# Patient Record
Sex: MALE | Race: WHITE | Employment: OTHER | URBAN - METROPOLITAN AREA
[De-identification: names, ages, dates, MRNs, and addresses within clinical notes are randomized per-mention and may not be internally consistent; named-entity substitution may affect disease eponyms.]

---

## 2017-02-28 ENCOUNTER — HOSPITAL ENCOUNTER (OUTPATIENT)
Dept: MRI IMAGING | Age: 72
Discharge: HOME | End: 2017-02-28
Attending: ORTHOPAEDIC SURGERY
Payer: MEDICARE

## 2017-02-28 DIAGNOSIS — M48.02 SPINAL STENOSIS IN CERVICAL REGION: ICD-10-CM

## 2017-02-28 DIAGNOSIS — M75.121 COMPLETE TEAR OF RIGHT ROTATOR CUFF: ICD-10-CM

## 2017-02-28 PROCEDURE — 72141 MRI NECK SPINE W/O DYE: CPT

## 2017-02-28 PROCEDURE — 73221 MRI JOINT UPR EXTREM W/O DYE: CPT

## 2017-09-18 ENCOUNTER — HOSPITAL ENCOUNTER (OUTPATIENT)
Dept: MRI IMAGING | Age: 72
Discharge: HOME | End: 2017-09-18
Attending: SPECIALIST
Payer: MEDICARE

## 2017-09-18 DIAGNOSIS — M54.9 BACK PAIN: ICD-10-CM

## 2017-09-18 DIAGNOSIS — M48.061 DEGENERATIVE LUMBAR SPINAL STENOSIS: ICD-10-CM

## 2017-09-18 DIAGNOSIS — M54.16 LUMBAR RADICULOPATHY: ICD-10-CM

## 2017-09-18 PROCEDURE — 72148 MRI LUMBAR SPINE W/O DYE: CPT

## 2018-08-06 ENCOUNTER — HOSPITAL ENCOUNTER (OUTPATIENT)
Dept: MRI IMAGING | Age: 73
Discharge: HOME | End: 2018-08-06
Attending: PAIN MEDICINE
Payer: MEDICARE

## 2018-08-06 DIAGNOSIS — M54.16 LUMBAR RADICULOPATHY: ICD-10-CM

## 2018-08-06 PROCEDURE — 72148 MRI LUMBAR SPINE W/O DYE: CPT

## 2018-12-28 ENCOUNTER — HOSPITAL ENCOUNTER (OUTPATIENT)
Dept: PREADMISSION TESTING | Age: 73
Discharge: HOME | End: 2018-12-28
Payer: MEDICARE

## 2018-12-28 VITALS
HEIGHT: 66 IN | DIASTOLIC BLOOD PRESSURE: 63 MMHG | WEIGHT: 266.56 LBS | TEMPERATURE: 97.6 F | OXYGEN SATURATION: 94 % | SYSTOLIC BLOOD PRESSURE: 135 MMHG | HEART RATE: 63 BPM | RESPIRATION RATE: 20 BRPM | BODY MASS INDEX: 42.84 KG/M2

## 2018-12-28 LAB
25(OH)D3 SERPL-MCNC: 34 NG/ML (ref 30–100)
ABO + RH BLD: NORMAL
ALBUMIN SERPL-MCNC: 3.7 G/DL (ref 3.5–5)
ALBUMIN/GLOB SERPL: 1.1 {RATIO} (ref 1.1–2.2)
ALP SERPL-CCNC: 43 U/L (ref 45–117)
ALT SERPL-CCNC: 28 U/L (ref 12–78)
ANION GAP SERPL CALC-SCNC: 11 MMOL/L (ref 5–15)
APPEARANCE UR: CLEAR
APTT PPP: 28.7 SEC (ref 22.1–32)
AST SERPL-CCNC: 22 U/L (ref 15–37)
ATRIAL RATE: 58 BPM
BACTERIA URNS QL MICRO: NEGATIVE /HPF
BASOPHILS # BLD: 0.1 K/UL (ref 0–0.1)
BASOPHILS NFR BLD: 1 % (ref 0–1)
BILIRUB SERPL-MCNC: 0.6 MG/DL (ref 0.2–1)
BILIRUB UR QL: NEGATIVE
BLOOD GROUP ANTIBODIES SERPL: NORMAL
BUN SERPL-MCNC: 23 MG/DL (ref 6–20)
BUN/CREAT SERPL: 19 (ref 12–20)
CALCIUM SERPL-MCNC: 9.5 MG/DL (ref 8.5–10.1)
CALCULATED P AXIS, ECG09: 20 DEGREES
CALCULATED R AXIS, ECG10: 53 DEGREES
CALCULATED T AXIS, ECG11: 48 DEGREES
CHLORIDE SERPL-SCNC: 103 MMOL/L (ref 97–108)
CO2 SERPL-SCNC: 28 MMOL/L (ref 21–32)
COLOR UR: NORMAL
CREAT SERPL-MCNC: 1.24 MG/DL (ref 0.7–1.3)
DIAGNOSIS, 93000: NORMAL
DIFFERENTIAL METHOD BLD: ABNORMAL
EOSINOPHIL # BLD: 0.2 K/UL (ref 0–0.4)
EOSINOPHIL NFR BLD: 2 % (ref 0–7)
EPITH CASTS URNS QL MICRO: NORMAL /LPF
ERYTHROCYTE [DISTWIDTH] IN BLOOD BY AUTOMATED COUNT: 15 % (ref 11.5–14.5)
EST. AVERAGE GLUCOSE BLD GHB EST-MCNC: 140 MG/DL
GLOBULIN SER CALC-MCNC: 3.4 G/DL (ref 2–4)
GLUCOSE SERPL-MCNC: 110 MG/DL (ref 65–100)
GLUCOSE UR STRIP.AUTO-MCNC: NEGATIVE MG/DL
HBA1C MFR BLD: 6.5 % (ref 4.2–6.3)
HCT VFR BLD AUTO: 37.1 % (ref 36.6–50.3)
HGB BLD-MCNC: 12.1 G/DL (ref 12.1–17)
HGB UR QL STRIP: NEGATIVE
HYALINE CASTS URNS QL MICRO: NORMAL /LPF (ref 0–5)
IMM GRANULOCYTES # BLD: 0 K/UL (ref 0–0.04)
IMM GRANULOCYTES NFR BLD AUTO: 0 % (ref 0–0.5)
INR PPP: 1.1 (ref 0.9–1.1)
KETONES UR QL STRIP.AUTO: NEGATIVE MG/DL
LEUKOCYTE ESTERASE UR QL STRIP.AUTO: NEGATIVE
LYMPHOCYTES # BLD: 1.2 K/UL (ref 0.8–3.5)
LYMPHOCYTES NFR BLD: 16 % (ref 12–49)
MCH RBC QN AUTO: 30.3 PG (ref 26–34)
MCHC RBC AUTO-ENTMCNC: 32.6 G/DL (ref 30–36.5)
MCV RBC AUTO: 93 FL (ref 80–99)
MONOCYTES # BLD: 0.7 K/UL (ref 0–1)
MONOCYTES NFR BLD: 9 % (ref 5–13)
NEUTS SEG # BLD: 5.4 K/UL (ref 1.8–8)
NEUTS SEG NFR BLD: 71 % (ref 32–75)
NITRITE UR QL STRIP.AUTO: NEGATIVE
NRBC # BLD: 0 K/UL (ref 0–0.01)
NRBC BLD-RTO: 0 PER 100 WBC
P-R INTERVAL, ECG05: 166 MS
PH UR STRIP: 7.5 [PH] (ref 5–8)
PLATELET # BLD AUTO: 240 K/UL (ref 150–400)
PMV BLD AUTO: 11 FL (ref 8.9–12.9)
POTASSIUM SERPL-SCNC: 4.2 MMOL/L (ref 3.5–5.1)
PROT SERPL-MCNC: 7.1 G/DL (ref 6.4–8.2)
PROT UR STRIP-MCNC: NEGATIVE MG/DL
PROTHROMBIN TIME: 11.3 SEC (ref 9–11.1)
Q-T INTERVAL, ECG07: 438 MS
QRS DURATION, ECG06: 92 MS
QTC CALCULATION (BEZET), ECG08: 429 MS
RBC # BLD AUTO: 3.99 M/UL (ref 4.1–5.7)
RBC #/AREA URNS HPF: NORMAL /HPF (ref 0–5)
SODIUM SERPL-SCNC: 142 MMOL/L (ref 136–145)
SP GR UR REFRACTOMETRY: 1.02 (ref 1–1.03)
SPECIMEN EXP DATE BLD: NORMAL
THERAPEUTIC RANGE,PTTT: NORMAL SECS (ref 58–77)
UA: UC IF INDICATED,UAUC: NORMAL
UROBILINOGEN UR QL STRIP.AUTO: 1 EU/DL (ref 0.2–1)
VENTRICULAR RATE, ECG03: 58 BPM
WBC # BLD AUTO: 7.6 K/UL (ref 4.1–11.1)
WBC URNS QL MICRO: NORMAL /HPF (ref 0–4)

## 2018-12-28 PROCEDURE — 83036 HEMOGLOBIN GLYCOSYLATED A1C: CPT

## 2018-12-28 PROCEDURE — 93005 ELECTROCARDIOGRAM TRACING: CPT

## 2018-12-28 PROCEDURE — 86900 BLOOD TYPING SEROLOGIC ABO: CPT

## 2018-12-28 PROCEDURE — 82306 VITAMIN D 25 HYDROXY: CPT

## 2018-12-28 PROCEDURE — 80053 COMPREHEN METABOLIC PANEL: CPT

## 2018-12-28 PROCEDURE — 36415 COLL VENOUS BLD VENIPUNCTURE: CPT

## 2018-12-28 PROCEDURE — 81001 URINALYSIS AUTO W/SCOPE: CPT

## 2018-12-28 PROCEDURE — 85025 COMPLETE CBC W/AUTO DIFF WBC: CPT

## 2018-12-28 PROCEDURE — 85730 THROMBOPLASTIN TIME PARTIAL: CPT

## 2018-12-28 PROCEDURE — 85610 PROTHROMBIN TIME: CPT

## 2018-12-28 RX ORDER — ATORVASTATIN CALCIUM 20 MG/1
40 TABLET, FILM COATED ORAL
COMMUNITY
End: 2022-10-26

## 2018-12-28 RX ORDER — GABAPENTIN 300 MG/1
300 CAPSULE ORAL 4 TIMES DAILY
COMMUNITY
End: 2018-12-28

## 2018-12-28 RX ORDER — ASPIRIN 325 MG
325 TABLET ORAL DAILY
COMMUNITY
End: 2019-01-11

## 2018-12-28 RX ORDER — CHOLECALCIFEROL (VITAMIN D3) 125 MCG
1 CAPSULE ORAL
COMMUNITY
End: 2022-10-26

## 2018-12-28 RX ORDER — CLONIDINE 0.1 MG/24H
1 PATCH, EXTENDED RELEASE TRANSDERMAL
COMMUNITY

## 2018-12-28 RX ORDER — AMLODIPINE BESYLATE AND BENAZEPRIL HYDROCHLORIDE 10; 40 MG/1; MG/1
1 CAPSULE ORAL DAILY
COMMUNITY

## 2018-12-28 RX ORDER — FENOFIBRATE 160 MG/1
160 TABLET ORAL
COMMUNITY
End: 2022-10-26

## 2018-12-28 RX ORDER — CARVEDILOL 25 MG/1
25 TABLET ORAL 2 TIMES DAILY WITH MEALS
COMMUNITY

## 2018-12-28 RX ORDER — GABAPENTIN 600 MG/1
600 TABLET ORAL
COMMUNITY
End: 2022-10-26

## 2018-12-28 RX ORDER — PIOGLITAZONE HCL AND METFORMIN HCL 500; 15 MG/1; MG/1
1 TABLET ORAL
COMMUNITY

## 2018-12-28 RX ORDER — HYDROCHLOROTHIAZIDE 25 MG/1
25 TABLET ORAL
COMMUNITY

## 2018-12-28 NOTE — PERIOP NOTES
[address] [facility] [address], [address] [facility]                            MAIN OR                                  [phone]   MAIN PRE OP                          [phone]                                                                                AMBULATORY PRE OP          [phone]  PRE-ADMISSION TESTING    [phone]     Surgery Date:   Monday, 1/7/19. Is surgery arrival time given by surgeon? NO  If NO, [facility] staff will call you between 3 and 7pm the day before your surgery with your arrival time. (If your surgery is on a Monday, we will call you the Friday before.)    Call [phone] after 7pm Monday-Friday if you did not receive your arrival time. Verification phone call on Friday, 1/4/19.     INSTRUCTIONS BEFORE YOUR SURGERY   When You  Arrive   Arrive at the [address] [facility] on the day of your surgery  Have your insurance card, photo ID, and any copayment (if needed)     Food   and   Drink   NO food or drink after midnight the night before surgery    This means NO water, gum, mints, coffee, juice, etc.  No alcohol (beer, wine, liquor) 24 hours before and after surgery     Medications to   TAKE   Morning of Surgery   MEDICATIONS TO TAKE THE MORNING OF SURGERY WITH A SIP OF WATER:    Carvedilol   with small sip of water     Medications  To  STOP      7 days before surgery    Non-Steroidal anti-inflammatory Drugs (NSAID's): for example, Ibuprofen (Advil, Motrin), Naproxen (Aleve) STOP Tuesday, 1/1/19 until after surgery   Aspirin, if taking for pain STOP Tuesday, 1/1/19 until after surgery   Herbal supplements, vitamins, and fish oil STOP Tuesday, 1/1/19 until after surgery   Other:  (Pain medications not listed above, including Tylenol may be taken)   Blood  Thinners    If you take  Aspirin, Plavix, Coumadin, or any blood-thinning or anti-blood clot medicine, talk to the doctor who prescribed the medications for pre-operative instructions. Bathing Clothing  Jewelry  Valuables       If you shower the morning of surgery, please do not apply anything to your skin (lotions, powders, deodorant, or makeup, especially mascara)   Follow all special bath instructions (for total joint replacement, spine and bowel surgeries)   Do not shave or trim anywhere 24 hours before surgery   Wear your hair loose or down; no pony-tails, buns, or metal hair clips   Wear loose, comfortable, clean clothes   Wear glasses instead of contacts  [employer] money, valuables, and jewelry, including body piercings, at home     Going Home       or Spending the Night    SAME-DAY SURGERY: You must have a responsible adult drive you home and stay with you 24 hours after surgery   ADMITS: If your doctor is keeping you into the hospital after surgery, leave personal belongings/luggage in your car until you have a hospital room number. Hospital discharge time is 12 noon  Drivers must be here before 12 noon unless you are told differently   Special Instructions 16. Special Instructions:  · Use Chlorhexidine Care Fusion wash and sponges 3 days prior to surgery as instructed. · Incentive spirometer given with instructions to practice at home and bring back to the hospital on the day of surgery. · [facility] will contact you if your Hemoglobin A1C is greater than 7.5. · Ensure/Glucerna  sample, nutritional information, and Ensure/Glucerna coupon given. · Pain pamphlet and Call Don't Fall reminder reviewed with patient. ·  parking is complimentary Monday - Friday 7 am - 5 pm  · Bring PTA Medication list day of surgery with the last doses taken documented   · Do not bring medication bottles the day of surgery  · Bring C-pap on day of surgery  · DO NOT TAKE ANY DIABETES MEDICINE DAY OF SURGERY         Follow all instructions so your surgery wont be cancelled. Please, be on time.                     If a situation occurs and you are delayed the day of surgery, call [phone] or          [phone]. If your physical condition changes (like a fever, cold, flu, etc.) call your surgeon. The patient / Caregiver was contacted  in person. The patient verbalizes understanding of all instructions and does not  need reinforcement.
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
## 2018-12-29 LAB
BACTERIA SPEC CULT: ABNORMAL
BACTERIA SPEC CULT: ABNORMAL
SERVICE CMNT-IMP: ABNORMAL

## 2018-12-30 RX ORDER — MUPIROCIN 20 MG/G
OINTMENT TOPICAL 2 TIMES DAILY
Qty: 22 G | Refills: 0 | Status: SHIPPED | OUTPATIENT
Start: 2018-12-30 | End: 2019-01-04

## 2019-01-04 ENCOUNTER — ANESTHESIA EVENT (OUTPATIENT)
Dept: SURGERY | Age: 74
DRG: 454 | End: 2019-01-04
Payer: MEDICARE

## 2019-01-07 ENCOUNTER — APPOINTMENT (OUTPATIENT)
Dept: GENERAL RADIOLOGY | Age: 74
DRG: 454 | End: 2019-01-07
Attending: ORTHOPAEDIC SURGERY
Payer: MEDICARE

## 2019-01-07 ENCOUNTER — ANESTHESIA (OUTPATIENT)
Dept: SURGERY | Age: 74
DRG: 454 | End: 2019-01-07
Payer: MEDICARE

## 2019-01-07 ENCOUNTER — HOSPITAL ENCOUNTER (INPATIENT)
Age: 74
LOS: 4 days | Discharge: HOME HEALTH | DRG: 454 | End: 2019-01-11
Attending: ORTHOPAEDIC SURGERY | Admitting: ORTHOPAEDIC SURGERY
Payer: MEDICARE

## 2019-01-07 DIAGNOSIS — M43.16 SPONDYLOLISTHESIS, LUMBAR REGION: Primary | ICD-10-CM

## 2019-01-07 LAB
GLUCOSE BLD STRIP.AUTO-MCNC: 116 MG/DL (ref 65–100)
GLUCOSE BLD STRIP.AUTO-MCNC: 147 MG/DL (ref 65–100)
SERVICE CMNT-IMP: ABNORMAL
SERVICE CMNT-IMP: ABNORMAL

## 2019-01-07 PROCEDURE — 77030026188 HC BN CANC CHP CRSH PR LIFV -E: Performed by: ORTHOPAEDIC SURGERY

## 2019-01-07 PROCEDURE — 77030034274 HC GRFT BN CHIP ALLGRFT 10CC REGE -I: Performed by: ORTHOPAEDIC SURGERY

## 2019-01-07 PROCEDURE — 76060000037 HC ANESTHESIA 3 TO 3.5 HR: Performed by: ORTHOPAEDIC SURGERY

## 2019-01-07 PROCEDURE — 77030018846 HC SOL IRR STRL H20 ICUM -A: Performed by: ORTHOPAEDIC SURGERY

## 2019-01-07 PROCEDURE — 74011250636 HC RX REV CODE- 250/636: Performed by: ANESTHESIOLOGY

## 2019-01-07 PROCEDURE — 77030018719 HC DRSG PTCH ANTIMIC J&J -A: Performed by: ORTHOPAEDIC SURGERY

## 2019-01-07 PROCEDURE — 77030039267 HC ADH SKN EXOFIN S2SG -B: Performed by: ORTHOPAEDIC SURGERY

## 2019-01-07 PROCEDURE — 0SG00AJ FUSION OF LUMBAR VERTEBRAL JOINT WITH INTERBODY FUSION DEVICE, POSTERIOR APPROACH, ANTERIOR COLUMN, OPEN APPROACH: ICD-10-PCS | Performed by: ORTHOPAEDIC SURGERY

## 2019-01-07 PROCEDURE — 76010000173 HC OR TIME 3 TO 3.5 HR INTENSV-TIER 1: Performed by: ORTHOPAEDIC SURGERY

## 2019-01-07 PROCEDURE — 74011000250 HC RX REV CODE- 250: Performed by: ORTHOPAEDIC SURGERY

## 2019-01-07 PROCEDURE — 77030032490 HC SLV COMPR SCD KNE COVD -B

## 2019-01-07 PROCEDURE — 77030018723 HC ELCTRD BLD COVD -A: Performed by: ORTHOPAEDIC SURGERY

## 2019-01-07 PROCEDURE — 8E0WXBF COMPUTER ASSISTED PROCEDURE OF TRUNK REGION, WITH FLUOROSCOPY: ICD-10-PCS | Performed by: ORTHOPAEDIC SURGERY

## 2019-01-07 PROCEDURE — 77030002982 HC SUT POLYSRB J&J -A: Performed by: ORTHOPAEDIC SURGERY

## 2019-01-07 PROCEDURE — 0SG0071 FUSION OF LUMBAR VERTEBRAL JOINT WITH AUTOLOGOUS TISSUE SUBSTITUTE, POSTERIOR APPROACH, POSTERIOR COLUMN, OPEN APPROACH: ICD-10-PCS | Performed by: ORTHOPAEDIC SURGERY

## 2019-01-07 PROCEDURE — 74011250636 HC RX REV CODE- 250/636

## 2019-01-07 PROCEDURE — 77030008684 HC TU ET CUF COVD -B: Performed by: ANESTHESIOLOGY

## 2019-01-07 PROCEDURE — 77030012406 HC DRN WND PENRS BARD -A: Performed by: ORTHOPAEDIC SURGERY

## 2019-01-07 PROCEDURE — 77030034850: Performed by: ORTHOPAEDIC SURGERY

## 2019-01-07 PROCEDURE — 74011250636 HC RX REV CODE- 250/636: Performed by: ORTHOPAEDIC SURGERY

## 2019-01-07 PROCEDURE — C1713 ANCHOR/SCREW BN/BN,TIS/BN: HCPCS | Performed by: ORTHOPAEDIC SURGERY

## 2019-01-07 PROCEDURE — 76210000005 HC OR PH I REC 5 TO 5.5 HR: Performed by: ORTHOPAEDIC SURGERY

## 2019-01-07 PROCEDURE — 77030037202 HC SPCR SPN BULL TIP PEK VBR IBF REGE -I1: Performed by: ORTHOPAEDIC SURGERY

## 2019-01-07 PROCEDURE — 77030002933 HC SUT MCRYL J&J -A: Performed by: ORTHOPAEDIC SURGERY

## 2019-01-07 PROCEDURE — 77030003161 HC GRFT DURA MTRX INLC -E: Performed by: ORTHOPAEDIC SURGERY

## 2019-01-07 PROCEDURE — 77030032490 HC SLV COMPR SCD KNE COVD -B: Performed by: ORTHOPAEDIC SURGERY

## 2019-01-07 PROCEDURE — 77030033067 HC SUT PDO STRATFX SPIR J&J -B: Performed by: ORTHOPAEDIC SURGERY

## 2019-01-07 PROCEDURE — 0ST20ZZ RESECTION OF LUMBAR VERTEBRAL DISC, OPEN APPROACH: ICD-10-PCS | Performed by: ORTHOPAEDIC SURGERY

## 2019-01-07 PROCEDURE — 77030030107 HC BLD BN MILL DISP STRY -C: Performed by: ORTHOPAEDIC SURGERY

## 2019-01-07 PROCEDURE — 82962 GLUCOSE BLOOD TEST: CPT

## 2019-01-07 PROCEDURE — 74011000272 HC RX REV CODE- 272: Performed by: ORTHOPAEDIC SURGERY

## 2019-01-07 PROCEDURE — 77030003666 HC NDL SPINAL BD -A: Performed by: ORTHOPAEDIC SURGERY

## 2019-01-07 PROCEDURE — 77030029099 HC BN WAX SSPC -A: Performed by: ORTHOPAEDIC SURGERY

## 2019-01-07 PROCEDURE — 77030031139 HC SUT VCRL2 J&J -A: Performed by: ORTHOPAEDIC SURGERY

## 2019-01-07 PROCEDURE — 65270000029 HC RM PRIVATE

## 2019-01-07 PROCEDURE — 77030004391 HC BUR FLUT MEDT -C: Performed by: ORTHOPAEDIC SURGERY

## 2019-01-07 PROCEDURE — 74011000250 HC RX REV CODE- 250

## 2019-01-07 DEVICE — SPACER SPNL 12X22 MM BULL TIP IF PEEK VBR: Type: IMPLANTABLE DEVICE | Site: BACK | Status: FUNCTIONAL

## 2019-01-07 DEVICE — SCR SET SPNE STREAMLINE TL --: Type: IMPLANTABLE DEVICE | Site: BACK | Status: FUNCTIONAL

## 2019-01-07 DEVICE — SCR BNE SPNE 6.5X50MM TI -- STREAMLINE TL: Type: IMPLANTABLE DEVICE | Site: BACK | Status: FUNCTIONAL

## 2019-01-07 DEVICE — GRAFT BNE SUB 10CC CORT CANC DBM CRYOGENICALLY PRESERVED: Type: IMPLANTABLE DEVICE | Site: BACK | Status: FUNCTIONAL

## 2019-01-07 DEVICE — DURAGEN® SUTURABLE DURAL REGENERATION MATRIX, 2 IN X 2 IN (5 CM X 5 CM)
Type: IMPLANTABLE DEVICE | Site: BACK | Status: FUNCTIONAL
Brand: DURAGEN® SUTURABLE

## 2019-01-07 DEVICE — BONE CHIP CANC CRSH 1-8MM 30ML --: Type: IMPLANTABLE DEVICE | Site: BACK | Status: FUNCTIONAL

## 2019-01-07 DEVICE — 5.5MM CURVED ROD 35MM TI ALLOY
Type: IMPLANTABLE DEVICE | Site: BACK | Status: FUNCTIONAL
Brand: TAURUS

## 2019-01-07 RX ORDER — LIDOCAINE HYDROCHLORIDE 20 MG/ML
INJECTION, SOLUTION EPIDURAL; INFILTRATION; INTRACAUDAL; PERINEURAL AS NEEDED
Status: DISCONTINUED | OUTPATIENT
Start: 2019-01-07 | End: 2019-01-07 | Stop reason: HOSPADM

## 2019-01-07 RX ORDER — SUCCINYLCHOLINE CHLORIDE 20 MG/ML
INJECTION INTRAMUSCULAR; INTRAVENOUS AS NEEDED
Status: DISCONTINUED | OUTPATIENT
Start: 2019-01-07 | End: 2019-01-07 | Stop reason: HOSPADM

## 2019-01-07 RX ORDER — HYDROMORPHONE HCL/0.9% NACL/PF 0.5 MG/ML
PLASTIC BAG, INJECTION (ML) INTRAVENOUS
Status: DISPENSED | OUTPATIENT
Start: 2019-01-07 | End: 2019-01-08

## 2019-01-07 RX ORDER — CEFAZOLIN SODIUM/WATER 2 G/20 ML
3 SYRINGE (ML) INTRAVENOUS
Status: DISCONTINUED | OUTPATIENT
Start: 2019-01-07 | End: 2019-01-07 | Stop reason: HOSPADM

## 2019-01-07 RX ORDER — VANCOMYCIN HYDROCHLORIDE 1 G/20ML
INJECTION, POWDER, LYOPHILIZED, FOR SOLUTION INTRAVENOUS AS NEEDED
Status: DISCONTINUED | OUTPATIENT
Start: 2019-01-07 | End: 2019-01-07 | Stop reason: HOSPADM

## 2019-01-07 RX ORDER — HYDROMORPHONE HYDROCHLORIDE 1 MG/ML
.5-1 INJECTION, SOLUTION INTRAMUSCULAR; INTRAVENOUS; SUBCUTANEOUS
Status: DISCONTINUED | OUTPATIENT
Start: 2019-01-07 | End: 2019-01-08 | Stop reason: HOSPADM

## 2019-01-07 RX ORDER — GLYCOPYRROLATE 0.2 MG/ML
INJECTION INTRAMUSCULAR; INTRAVENOUS AS NEEDED
Status: DISCONTINUED | OUTPATIENT
Start: 2019-01-07 | End: 2019-01-07 | Stop reason: HOSPADM

## 2019-01-07 RX ORDER — PROPOFOL 10 MG/ML
INJECTION, EMULSION INTRAVENOUS AS NEEDED
Status: DISCONTINUED | OUTPATIENT
Start: 2019-01-07 | End: 2019-01-07 | Stop reason: HOSPADM

## 2019-01-07 RX ORDER — ROCURONIUM BROMIDE 10 MG/ML
INJECTION, SOLUTION INTRAVENOUS AS NEEDED
Status: DISCONTINUED | OUTPATIENT
Start: 2019-01-07 | End: 2019-01-07 | Stop reason: HOSPADM

## 2019-01-07 RX ORDER — CEFAZOLIN SODIUM 1 G/3ML
INJECTION, POWDER, FOR SOLUTION INTRAMUSCULAR; INTRAVENOUS AS NEEDED
Status: DISCONTINUED | OUTPATIENT
Start: 2019-01-07 | End: 2019-01-07 | Stop reason: HOSPADM

## 2019-01-07 RX ORDER — POVIDONE-IODINE 10 %
SOLUTION, NON-ORAL TOPICAL AS NEEDED
Status: DISCONTINUED | OUTPATIENT
Start: 2019-01-07 | End: 2019-01-07 | Stop reason: HOSPADM

## 2019-01-07 RX ORDER — MIDAZOLAM HYDROCHLORIDE 1 MG/ML
INJECTION, SOLUTION INTRAMUSCULAR; INTRAVENOUS AS NEEDED
Status: DISCONTINUED | OUTPATIENT
Start: 2019-01-07 | End: 2019-01-07 | Stop reason: HOSPADM

## 2019-01-07 RX ORDER — LIDOCAINE HYDROCHLORIDE 10 MG/ML
0.1 INJECTION, SOLUTION EPIDURAL; INFILTRATION; INTRACAUDAL; PERINEURAL AS NEEDED
Status: DISCONTINUED | OUTPATIENT
Start: 2019-01-07 | End: 2019-01-07 | Stop reason: HOSPADM

## 2019-01-07 RX ORDER — SODIUM CHLORIDE, SODIUM LACTATE, POTASSIUM CHLORIDE, CALCIUM CHLORIDE 600; 310; 30; 20 MG/100ML; MG/100ML; MG/100ML; MG/100ML
125 INJECTION, SOLUTION INTRAVENOUS CONTINUOUS
Status: DISCONTINUED | OUTPATIENT
Start: 2019-01-07 | End: 2019-01-07 | Stop reason: HOSPADM

## 2019-01-07 RX ORDER — HYDROMORPHONE HYDROCHLORIDE 2 MG/ML
INJECTION, SOLUTION INTRAMUSCULAR; INTRAVENOUS; SUBCUTANEOUS AS NEEDED
Status: DISCONTINUED | OUTPATIENT
Start: 2019-01-07 | End: 2019-01-07 | Stop reason: HOSPADM

## 2019-01-07 RX ORDER — FENTANYL CITRATE 50 UG/ML
INJECTION, SOLUTION INTRAMUSCULAR; INTRAVENOUS AS NEEDED
Status: DISCONTINUED | OUTPATIENT
Start: 2019-01-07 | End: 2019-01-07 | Stop reason: HOSPADM

## 2019-01-07 RX ORDER — SODIUM CHLORIDE 9 MG/ML
125 INJECTION, SOLUTION INTRAVENOUS CONTINUOUS
Status: DISPENSED | OUTPATIENT
Start: 2019-01-07 | End: 2019-01-08

## 2019-01-07 RX ORDER — NEOSTIGMINE METHYLSULFATE 1 MG/ML
INJECTION INTRAVENOUS AS NEEDED
Status: DISCONTINUED | OUTPATIENT
Start: 2019-01-07 | End: 2019-01-07 | Stop reason: HOSPADM

## 2019-01-07 RX ORDER — ONDANSETRON 2 MG/ML
4 INJECTION INTRAMUSCULAR; INTRAVENOUS AS NEEDED
Status: DISCONTINUED | OUTPATIENT
Start: 2019-01-07 | End: 2019-01-08 | Stop reason: HOSPADM

## 2019-01-07 RX ORDER — DEXAMETHASONE SODIUM PHOSPHATE 4 MG/ML
INJECTION, SOLUTION INTRA-ARTICULAR; INTRALESIONAL; INTRAMUSCULAR; INTRAVENOUS; SOFT TISSUE AS NEEDED
Status: DISCONTINUED | OUTPATIENT
Start: 2019-01-07 | End: 2019-01-07 | Stop reason: HOSPADM

## 2019-01-07 RX ORDER — ONDANSETRON 2 MG/ML
INJECTION INTRAMUSCULAR; INTRAVENOUS AS NEEDED
Status: DISCONTINUED | OUTPATIENT
Start: 2019-01-07 | End: 2019-01-07 | Stop reason: HOSPADM

## 2019-01-07 RX ORDER — SODIUM CHLORIDE, SODIUM LACTATE, POTASSIUM CHLORIDE, CALCIUM CHLORIDE 600; 310; 30; 20 MG/100ML; MG/100ML; MG/100ML; MG/100ML
125 INJECTION, SOLUTION INTRAVENOUS CONTINUOUS
Status: DISCONTINUED | OUTPATIENT
Start: 2019-01-07 | End: 2019-01-08 | Stop reason: HOSPADM

## 2019-01-07 RX ADMIN — CEFAZOLIN 3 G: 1 INJECTION, POWDER, FOR SOLUTION INTRAMUSCULAR; INTRAVENOUS; PARENTERAL at 21:49

## 2019-01-07 RX ADMIN — NEOSTIGMINE METHYLSULFATE 3 MG: 1 INJECTION INTRAVENOUS at 17:31

## 2019-01-07 RX ADMIN — DEXAMETHASONE SODIUM PHOSPHATE 8 MG: 4 INJECTION, SOLUTION INTRA-ARTICULAR; INTRALESIONAL; INTRAMUSCULAR; INTRAVENOUS; SOFT TISSUE at 17:15

## 2019-01-07 RX ADMIN — PROPOFOL 200 MG: 10 INJECTION, EMULSION INTRAVENOUS at 16:00

## 2019-01-07 RX ADMIN — FENTANYL CITRATE 50 MCG: 50 INJECTION, SOLUTION INTRAMUSCULAR; INTRAVENOUS at 16:00

## 2019-01-07 RX ADMIN — SUCCINYLCHOLINE CHLORIDE 100 MG: 20 INJECTION INTRAMUSCULAR; INTRAVENOUS at 16:01

## 2019-01-07 RX ADMIN — GLYCOPYRROLATE 0.4 MG: 0.2 INJECTION INTRAMUSCULAR; INTRAVENOUS at 17:31

## 2019-01-07 RX ADMIN — FENTANYL CITRATE 100 MCG: 50 INJECTION, SOLUTION INTRAMUSCULAR; INTRAVENOUS at 17:30

## 2019-01-07 RX ADMIN — ROCURONIUM BROMIDE 5 MG: 10 INJECTION, SOLUTION INTRAVENOUS at 15:59

## 2019-01-07 RX ADMIN — Medication: at 19:35

## 2019-01-07 RX ADMIN — LIDOCAINE HYDROCHLORIDE 60 MG: 20 INJECTION, SOLUTION EPIDURAL; INFILTRATION; INTRACAUDAL; PERINEURAL at 16:00

## 2019-01-07 RX ADMIN — HYDROMORPHONE HYDROCHLORIDE 1 MG: 2 INJECTION, SOLUTION INTRAMUSCULAR; INTRAVENOUS; SUBCUTANEOUS at 19:00

## 2019-01-07 RX ADMIN — MIDAZOLAM HYDROCHLORIDE 3 MG: 1 INJECTION, SOLUTION INTRAMUSCULAR; INTRAVENOUS at 16:00

## 2019-01-07 RX ADMIN — ROCURONIUM BROMIDE 35 MG: 10 INJECTION, SOLUTION INTRAVENOUS at 16:04

## 2019-01-07 RX ADMIN — SODIUM CHLORIDE 125 ML/HR: 900 INJECTION, SOLUTION INTRAVENOUS at 19:20

## 2019-01-07 RX ADMIN — ONDANSETRON 4 MG: 2 INJECTION INTRAMUSCULAR; INTRAVENOUS at 18:23

## 2019-01-07 RX ADMIN — HYDROMORPHONE HYDROCHLORIDE 0.5 MG: 2 INJECTION, SOLUTION INTRAMUSCULAR; INTRAVENOUS; SUBCUTANEOUS at 18:23

## 2019-01-07 RX ADMIN — SODIUM CHLORIDE, SODIUM LACTATE, POTASSIUM CHLORIDE, AND CALCIUM CHLORIDE 125 ML/HR: 600; 310; 30; 20 INJECTION, SOLUTION INTRAVENOUS at 15:35

## 2019-01-07 RX ADMIN — FENTANYL CITRATE 50 MCG: 50 INJECTION, SOLUTION INTRAMUSCULAR; INTRAVENOUS at 17:25

## 2019-01-07 RX ADMIN — HYDROMORPHONE HYDROCHLORIDE 0.5 MG: 2 INJECTION, SOLUTION INTRAMUSCULAR; INTRAVENOUS; SUBCUTANEOUS at 18:44

## 2019-01-07 RX ADMIN — FENTANYL CITRATE 50 MCG: 50 INJECTION, SOLUTION INTRAMUSCULAR; INTRAVENOUS at 15:55

## 2019-01-07 RX ADMIN — MIDAZOLAM HYDROCHLORIDE 2 MG: 1 INJECTION, SOLUTION INTRAMUSCULAR; INTRAVENOUS at 15:55

## 2019-01-07 RX ADMIN — CEFAZOLIN SODIUM 3 G: 1 INJECTION, POWDER, FOR SOLUTION INTRAMUSCULAR; INTRAVENOUS at 16:07

## 2019-01-07 RX ADMIN — HYDROMORPHONE HYDROCHLORIDE 0.5 MG: 1 INJECTION, SOLUTION INTRAMUSCULAR; INTRAVENOUS; SUBCUTANEOUS at 19:19

## 2019-01-07 NOTE — OP NOTES
[address] [facility]  [address], [address] [facility]    OPERATIVE REPORT      NAME: Leonard Quinonez    AGE: 72 y.o. YOB: 1945    MEDICAL RECORD NUMBER: [id number]    DATE OF SURGERY: 1/7/2019    PREOPERATIVE DIAGNOSES:  1. Lumbar stenosis. 2. Acquired lumbar spondylolisthesis. POSTOPERATIVE DIAGNOSES:  1. Lumbar stenosis. 2. Acquired lumbar spondylolisthesis. OPERATIVE PROCEDURES:  1. Laminectomy, partial facetectomy, and foraminotomy of L5.   2. Laminectomy, partial facetectomy, and foraminotomy of L4.  3. Laminectomy of S1.   4. Posterolateral fusion and posterior lumbar interbody fusion of L4-5.   5. Pedicle screw instrumentation with [place] pedicle screws for L4 and L5 bilaterally. 6. Application of biomechanical intervertebral body device at L4-5   7. Morselized allograft for spine surgery and local autograft for spine surgery with stem cells. SURGEON: Curtis Singh MD     ASSISTANT: ELÍAS Soares    ANESTHESIA: General    ESTIMATED BLOOD LOSS:  990    COMPLICATIONS: None apparent    Specimens - no    NEUROMONITORING: We used SSEPs and spontaneous EMGs with pedicle screw stimulation    INDICATION: The patient is a very pleasant 72 y.o. male with debilitating leg pain and back pain. He failed conservative measures. He elected to proceed with operative intervention. He was aware of the risks, benefits, and alternatives. He provided informed consent. DESCRIPTION OF PROCEDURE: The patient was identified in the preoperative holding area. The lumbar spine was marked by me. He was transferred to the operating room where general anesthesia was given. He was also given perioperative antibiotics. He was placed prone on the [facility] table. All bony prominences were well padded. The lumbar spine was prepped and draped in the usual standard fashion. We performed a surgical time out. I made a skin incision from L3 to L5.  I exposed the posterior lumbar spine in standard fashion. I took intraoperative fluoroscopy to verify our levels. I exposed the transverse processes of L4 and L5 bilaterally. I then began my decompression by performing a laminectomy of L4. I also performed a partial facetectomy and foraminotomy to decompress the thecal sac and nerve roots of L4. I performed a laminectomy of L5. I performed a partial facetectomy bilaterally and foraminotomy to decompress the thecal sac and traversing L5 nerve roots. I decompressed the stenosis found within the foramen and lateral to the foramen for L4-L5. At this point our transforaminal approach to L5 on the right side was complete with exposure of the right L4-5 disc space. I also performed a laminectomy of S1. I then performed a standard discectomy at L4-5. I prepared the endplates to bleeding bone. I performed trial sizing. I placed a biomechanical device into L4-5 with the appropriate amount of tension and alignment. I placed bone graft into the biomechanical device. I then cannulated our pedicles for L4 and L5 bilaterally in standard fashion. I probed each pedicle. I copiously irrigated the entire wound. I decorticated our fusion beds bilaterally with a high-speed jason. I placed our bone graft into our fusions beds bilaterally. I then placed pedicle screws for L4 and L5 bilaterally in standard fashion under fluoroscopic guidance. I had good purchase for each pedicle. I then stimulated all 4 pedicle screws with pedicle screw stimulation. The pedicle screws were found to be within the appropriate range of amplitude. I then placed contoured rods on top of the pedicles bilaterally. The rods were locked to the pedicle screws according to the 's specification with set screws. We had good hemostasis. There was no CSF leaking. The fusion beds were packed with morselized allograft and local autograft. The exposed neurologic elements were protected with Duragen.  I injected the soft tissues with a pain management cocktail. A deep drain was placed. The wound was closed in layers. A sterile dressing was applied. The patient was extubated and transferred to the recovery room in good medical condition. The PA assisted with retraction and wound closure. IDr. Christine, performed the above procedures.      Christine Sanchez MD  1/7/2019

## 2019-01-07 NOTE — ANESTHESIA PREPROCEDURE EVALUATION
Anesthetic History   No history of anesthetic complications            Review of Systems / Medical History  Patient summary reviewed, nursing notes reviewed and pertinent labs reviewed    Pulmonary        Sleep apnea  Smoker         Neuro/Psych   Within defined limits          Comments: Chronic pain, R lumbar radiculopathy Cardiovascular    Hypertension                   GI/Hepatic/Renal         Renal disease: CRI       Endo/Other    Diabetes    Morbid obesity     Other Findings            Physical Exam    Airway  Mallampati: III    Neck ROM: normal range of motion   Mouth opening: Normal     Cardiovascular    Rhythm: regular  Rate: normal         Dental    Dentition: Poor dentition     Pulmonary  Breath sounds clear to auscultation               Abdominal  GI exam deferred       Other Findings            Anesthetic Plan    ASA: 3  Anesthesia type: general          Induction: Intravenous  Anesthetic plan and risks discussed with: Patient

## 2019-01-07 NOTE — H&P
Date of Surgery Update:  Humberto Garibay was seen and examined. History and physical has been reviewed. The patient has been examined.  There have been no significant clinical changes since the completion of the originally dated History and Physical.    Signed By: Easton Hedrick MD     January 7, 2019 3:23 PM

## 2019-01-08 LAB
ANION GAP SERPL CALC-SCNC: 9 MMOL/L (ref 5–15)
BUN SERPL-MCNC: 26 MG/DL (ref 6–20)
BUN/CREAT SERPL: 22 (ref 12–20)
CALCIUM SERPL-MCNC: 9 MG/DL (ref 8.5–10.1)
CHLORIDE SERPL-SCNC: 103 MMOL/L (ref 97–108)
CO2 SERPL-SCNC: 27 MMOL/L (ref 21–32)
CREAT SERPL-MCNC: 1.17 MG/DL (ref 0.7–1.3)
GLUCOSE BLD STRIP.AUTO-MCNC: 140 MG/DL (ref 65–100)
GLUCOSE BLD STRIP.AUTO-MCNC: 143 MG/DL (ref 65–100)
GLUCOSE BLD STRIP.AUTO-MCNC: 151 MG/DL (ref 65–100)
GLUCOSE SERPL-MCNC: 175 MG/DL (ref 65–100)
HGB BLD-MCNC: 12.5 G/DL (ref 12.1–17)
POTASSIUM SERPL-SCNC: 4 MMOL/L (ref 3.5–5.1)
SERVICE CMNT-IMP: ABNORMAL
SODIUM SERPL-SCNC: 139 MMOL/L (ref 136–145)

## 2019-01-08 PROCEDURE — 94760 N-INVAS EAR/PLS OXIMETRY 1: CPT

## 2019-01-08 PROCEDURE — 74011000250 HC RX REV CODE- 250: Performed by: ORTHOPAEDIC SURGERY

## 2019-01-08 PROCEDURE — L0627 LO SAG RI AN/POS PNL PRE CST: HCPCS

## 2019-01-08 PROCEDURE — 82962 GLUCOSE BLOOD TEST: CPT

## 2019-01-08 PROCEDURE — 74011250636 HC RX REV CODE- 250/636: Performed by: ORTHOPAEDIC SURGERY

## 2019-01-08 PROCEDURE — 74011636637 HC RX REV CODE- 636/637: Performed by: ORTHOPAEDIC SURGERY

## 2019-01-08 PROCEDURE — 77030027138 HC INCENT SPIROMETER -A

## 2019-01-08 PROCEDURE — 97530 THERAPEUTIC ACTIVITIES: CPT

## 2019-01-08 PROCEDURE — 80048 BASIC METABOLIC PNL TOTAL CA: CPT

## 2019-01-08 PROCEDURE — 36415 COLL VENOUS BLD VENIPUNCTURE: CPT

## 2019-01-08 PROCEDURE — 74011250637 HC RX REV CODE- 250/637: Performed by: ORTHOPAEDIC SURGERY

## 2019-01-08 PROCEDURE — 77030020782 HC GWN BAIR PAWS FLX 3M -B

## 2019-01-08 PROCEDURE — 85018 HEMOGLOBIN: CPT

## 2019-01-08 PROCEDURE — 97165 OT EVAL LOW COMPLEX 30 MIN: CPT

## 2019-01-08 PROCEDURE — 77010033678 HC OXYGEN DAILY

## 2019-01-08 PROCEDURE — 94762 N-INVAS EAR/PLS OXIMTRY CONT: CPT

## 2019-01-08 PROCEDURE — 65270000029 HC RM PRIVATE

## 2019-01-08 PROCEDURE — 97161 PT EVAL LOW COMPLEX 20 MIN: CPT

## 2019-01-08 PROCEDURE — 97116 GAIT TRAINING THERAPY: CPT

## 2019-01-08 RX ORDER — OXYCODONE HYDROCHLORIDE 5 MG/1
10 TABLET ORAL
Status: DISCONTINUED | OUTPATIENT
Start: 2019-01-08 | End: 2019-01-09

## 2019-01-08 RX ORDER — AMOXICILLIN 250 MG
1 CAPSULE ORAL 2 TIMES DAILY
Status: DISCONTINUED | OUTPATIENT
Start: 2019-01-08 | End: 2019-01-11 | Stop reason: HOSPADM

## 2019-01-08 RX ORDER — DEXTROSE 50 % IN WATER (D50W) INTRAVENOUS SYRINGE
25-50 AS NEEDED
Status: DISCONTINUED | OUTPATIENT
Start: 2019-01-08 | End: 2019-01-11 | Stop reason: HOSPADM

## 2019-01-08 RX ORDER — SODIUM CHLORIDE 0.9 % (FLUSH) 0.9 %
5-40 SYRINGE (ML) INJECTION EVERY 8 HOURS
Status: DISCONTINUED | OUTPATIENT
Start: 2019-01-08 | End: 2019-01-11 | Stop reason: HOSPADM

## 2019-01-08 RX ORDER — FACIAL-BODY WIPES
10 EACH TOPICAL DAILY PRN
Status: DISCONTINUED | OUTPATIENT
Start: 2019-01-09 | End: 2019-01-11 | Stop reason: HOSPADM

## 2019-01-08 RX ORDER — FAMOTIDINE 20 MG/1
20 TABLET, FILM COATED ORAL 2 TIMES DAILY
Status: DISCONTINUED | OUTPATIENT
Start: 2019-01-08 | End: 2019-01-11 | Stop reason: HOSPADM

## 2019-01-08 RX ORDER — ACETAMINOPHEN 325 MG/1
650 TABLET ORAL
Status: DISCONTINUED | OUTPATIENT
Start: 2019-01-08 | End: 2019-01-11 | Stop reason: HOSPADM

## 2019-01-08 RX ORDER — HYDROMORPHONE HYDROCHLORIDE 2 MG/ML
0.5 INJECTION, SOLUTION INTRAMUSCULAR; INTRAVENOUS; SUBCUTANEOUS
Status: DISPENSED | OUTPATIENT
Start: 2019-01-08 | End: 2019-01-09

## 2019-01-08 RX ORDER — CLONIDINE 0.1 MG/24H
1 PATCH, EXTENDED RELEASE TRANSDERMAL
Status: DISCONTINUED | OUTPATIENT
Start: 2019-01-13 | End: 2019-01-11 | Stop reason: HOSPADM

## 2019-01-08 RX ORDER — MAGNESIUM SULFATE 100 %
4 CRYSTALS MISCELLANEOUS AS NEEDED
Status: DISCONTINUED | OUTPATIENT
Start: 2019-01-08 | End: 2019-01-11 | Stop reason: HOSPADM

## 2019-01-08 RX ORDER — CARVEDILOL 12.5 MG/1
25 TABLET ORAL 2 TIMES DAILY WITH MEALS
Status: DISCONTINUED | OUTPATIENT
Start: 2019-01-08 | End: 2019-01-11 | Stop reason: HOSPADM

## 2019-01-08 RX ORDER — HYDROCHLOROTHIAZIDE 25 MG/1
25 TABLET ORAL
Status: DISCONTINUED | OUTPATIENT
Start: 2019-01-08 | End: 2019-01-11 | Stop reason: HOSPADM

## 2019-01-08 RX ORDER — INSULIN LISPRO 100 [IU]/ML
INJECTION, SOLUTION INTRAVENOUS; SUBCUTANEOUS
Status: DISCONTINUED | OUTPATIENT
Start: 2019-01-08 | End: 2019-01-11 | Stop reason: HOSPADM

## 2019-01-08 RX ORDER — POLYETHYLENE GLYCOL 3350 17 G/17G
17 POWDER, FOR SOLUTION ORAL DAILY
Status: DISCONTINUED | OUTPATIENT
Start: 2019-01-08 | End: 2019-01-11 | Stop reason: HOSPADM

## 2019-01-08 RX ORDER — METFORMIN HYDROCHLORIDE 500 MG/1
500 TABLET ORAL
Status: DISCONTINUED | OUTPATIENT
Start: 2019-01-08 | End: 2019-01-11 | Stop reason: HOSPADM

## 2019-01-08 RX ORDER — LISINOPRIL 20 MG/1
40 TABLET ORAL DAILY
Status: DISCONTINUED | OUTPATIENT
Start: 2019-01-08 | End: 2019-01-11 | Stop reason: HOSPADM

## 2019-01-08 RX ORDER — PIOGLITAZONEHYDROCHLORIDE 15 MG/1
15 TABLET ORAL
Status: DISCONTINUED | OUTPATIENT
Start: 2019-01-08 | End: 2019-01-11 | Stop reason: HOSPADM

## 2019-01-08 RX ORDER — DIPHENHYDRAMINE HYDROCHLORIDE 50 MG/ML
12.5 INJECTION, SOLUTION INTRAMUSCULAR; INTRAVENOUS
Status: DISCONTINUED | OUTPATIENT
Start: 2019-01-08 | End: 2019-01-11 | Stop reason: HOSPADM

## 2019-01-08 RX ORDER — MELATONIN
2000
Status: DISCONTINUED | OUTPATIENT
Start: 2019-01-08 | End: 2019-01-11 | Stop reason: HOSPADM

## 2019-01-08 RX ORDER — SODIUM CHLORIDE 0.9 % (FLUSH) 0.9 %
5-40 SYRINGE (ML) INJECTION AS NEEDED
Status: DISCONTINUED | OUTPATIENT
Start: 2019-01-08 | End: 2019-01-11 | Stop reason: HOSPADM

## 2019-01-08 RX ORDER — ONDANSETRON 2 MG/ML
4 INJECTION INTRAMUSCULAR; INTRAVENOUS
Status: ACTIVE | OUTPATIENT
Start: 2019-01-08 | End: 2019-01-09

## 2019-01-08 RX ORDER — ATORVASTATIN CALCIUM 20 MG/1
20 TABLET, FILM COATED ORAL
Status: DISCONTINUED | OUTPATIENT
Start: 2019-01-08 | End: 2019-01-11 | Stop reason: HOSPADM

## 2019-01-08 RX ORDER — AMLODIPINE BESYLATE 5 MG/1
10 TABLET ORAL DAILY
Status: DISCONTINUED | OUTPATIENT
Start: 2019-01-08 | End: 2019-01-11 | Stop reason: HOSPADM

## 2019-01-08 RX ORDER — GABAPENTIN 100 MG/1
600 CAPSULE ORAL
Status: DISCONTINUED | OUTPATIENT
Start: 2019-01-08 | End: 2019-01-11 | Stop reason: HOSPADM

## 2019-01-08 RX ORDER — OXYCODONE HYDROCHLORIDE 5 MG/1
5 TABLET ORAL
Status: DISCONTINUED | OUTPATIENT
Start: 2019-01-08 | End: 2019-01-09

## 2019-01-08 RX ORDER — NALOXONE HYDROCHLORIDE 0.4 MG/ML
0.4 INJECTION, SOLUTION INTRAMUSCULAR; INTRAVENOUS; SUBCUTANEOUS AS NEEDED
Status: DISCONTINUED | OUTPATIENT
Start: 2019-01-08 | End: 2019-01-11 | Stop reason: HOSPADM

## 2019-01-08 RX ADMIN — METFORMIN HYDROCHLORIDE 500 MG: 500 TABLET ORAL at 09:28

## 2019-01-08 RX ADMIN — STANDARDIZED SENNA CONCENTRATE AND DOCUSATE SODIUM 1 TABLET: 8.6; 5 TABLET, FILM COATED ORAL at 09:28

## 2019-01-08 RX ADMIN — CARVEDILOL 25 MG: 12.5 TABLET, FILM COATED ORAL at 17:10

## 2019-01-08 RX ADMIN — AMLODIPINE BESYLATE 10 MG: 5 TABLET ORAL at 09:28

## 2019-01-08 RX ADMIN — POLYETHYLENE GLYCOL 3350 17 G: 17 POWDER, FOR SOLUTION ORAL at 09:29

## 2019-01-08 RX ADMIN — LISINOPRIL 40 MG: 20 TABLET ORAL at 09:27

## 2019-01-08 RX ADMIN — STANDARDIZED SENNA CONCENTRATE AND DOCUSATE SODIUM 1 TABLET: 8.6; 5 TABLET, FILM COATED ORAL at 17:07

## 2019-01-08 RX ADMIN — Medication 10 ML: at 14:30

## 2019-01-08 RX ADMIN — FAMOTIDINE 20 MG: 20 TABLET ORAL at 17:07

## 2019-01-08 RX ADMIN — HYDROCHLOROTHIAZIDE 25 MG: 25 TABLET ORAL at 09:28

## 2019-01-08 RX ADMIN — PIOGLITAZONE 15 MG: 15 TABLET ORAL at 09:28

## 2019-01-08 RX ADMIN — FAMOTIDINE 20 MG: 20 TABLET ORAL at 09:28

## 2019-01-08 RX ADMIN — SODIUM CHLORIDE 125 ML/HR: 900 INJECTION, SOLUTION INTRAVENOUS at 11:40

## 2019-01-08 RX ADMIN — INSULIN LISPRO 2 UNITS: 100 INJECTION, SOLUTION INTRAVENOUS; SUBCUTANEOUS at 09:26

## 2019-01-08 RX ADMIN — OXYCODONE HYDROCHLORIDE 10 MG: 5 TABLET ORAL at 22:35

## 2019-01-08 RX ADMIN — CEFAZOLIN 3 G: 1 INJECTION, POWDER, FOR SOLUTION INTRAMUSCULAR; INTRAVENOUS; PARENTERAL at 05:45

## 2019-01-08 RX ADMIN — ATORVASTATIN CALCIUM 20 MG: 20 TABLET, FILM COATED ORAL at 00:51

## 2019-01-08 RX ADMIN — ACETAMINOPHEN 650 MG: 325 TABLET, FILM COATED ORAL at 22:50

## 2019-01-08 RX ADMIN — GABAPENTIN 600 MG: 100 CAPSULE ORAL at 00:51

## 2019-01-08 RX ADMIN — ATORVASTATIN CALCIUM 20 MG: 20 TABLET, FILM COATED ORAL at 21:53

## 2019-01-08 RX ADMIN — INSULIN LISPRO 2 UNITS: 100 INJECTION, SOLUTION INTRAVENOUS; SUBCUTANEOUS at 17:06

## 2019-01-08 RX ADMIN — VITAMIN D, TAB 1000IU (100/BT) 2000 UNITS: 25 TAB at 09:29

## 2019-01-08 RX ADMIN — SODIUM CHLORIDE 125 ML/HR: 900 INJECTION, SOLUTION INTRAVENOUS at 09:30

## 2019-01-08 RX ADMIN — GABAPENTIN 600 MG: 100 CAPSULE ORAL at 21:52

## 2019-01-08 RX ADMIN — CEFAZOLIN 3 G: 1 INJECTION, POWDER, FOR SOLUTION INTRAMUSCULAR; INTRAVENOUS; PARENTERAL at 14:00

## 2019-01-08 RX ADMIN — Medication 10 ML: at 22:00

## 2019-01-08 RX ADMIN — CARVEDILOL 25 MG: 12.5 TABLET, FILM COATED ORAL at 09:27

## 2019-01-08 NOTE — PROGRESS NOTES
Problem: Mobility Impaired (Adult and Pediatric)  Goal: *Acute Goals and Plan of Care (Insert Text)  Physical Therapy Goals  Initiated 1/8/2019    1. Patient will move from supine to sit and sit to supine  in bed with modified independence within 4 days. 2. Patient will perform sit to stand with modified independence within 4 days. 3. Patient will ambulate with modified independence for 100 feet with the least restrictive device within 4 days. 4. Patient will ascend/descend 4 stairs with 1 handrail(s) with minimal assistance/contact guard assist within 4 days. 5. Patient will verbalize and demonstrate understanding of spinal precautions (No bending, lifting greater than 5 lbs, or twisting; log-roll technique; frequent repositioning as instructed) within 4 days. physical Therapy TREATMENT  Patient: Veronica Naidu (77 y.o. male)  Date: 1/8/2019  Precautions: Fall, Back(LSO when OOB)  Chart, physical therapy assessment, plan of care and goals were reviewed. ASSESSMENT:  Patient received in bed willing to ambulate. Rates pain 6/10 and still using PCA. Patient rolled and sat on edge of bed with CGA. Donned brace independently once placed. He stood and ambulated 110 feet with rolling walker and CGA. Needs some cues for safety. PT adjusted patients new rolling walker. He will benefit from HHPT upon discharge. Progression toward goals:  [x]      Improving appropriately and progressing toward goals  []      Improving slowly and progressing toward goals  []      Not making progress toward goals and plan of care will be adjusted     PLAN:  Patient continues to benefit from skilled intervention to address the above impairments. Continue treatment per established plan of care. Discharge Recommendations:  Home Health  Further Equipment Recommendations for Discharge:  None new. SUBJECTIVE:   Patient stated I can walk.    The patient stated 3/3 back precautions. Reviewed all 3 with patient.     OBJECTIVE DATA SUMMARY:   Functional Mobility Training:  Bed Mobility:  Log Rolling: Contact guard assistance  Supine to Sit: Minimum assistance     Scooting: Modified independent        Brace donned with  modified independence   Transfers:  Sit to Stand: Contact guard assistance  Stand to Sit: Contact guard assistance        Bed to Chair: Contact guard assistance                    Ambulation/Gait Training:  Distance (ft): 110 Feet (ft)  Assistive Device: Brace/Splint;Gait belt;Walker, rolling  Ambulation - Level of Assistance: Minimal assistance        Gait Abnormalities: Antalgic;Trunk sway increased        Base of Support: Widened     Speed/Brenda: Pace decreased (<100 feet/min)                                  Pain:  Pain Scale 1: Numeric (0 - 10)  Pain Intensity 1: 6  Pain Location 1: Back  Pain Orientation 1: Lower  Pain Description 1: Aching  Pain Intervention(s) 1: Encouraged PCA  Activity Tolerance:   Good. Please refer to the flowsheet for vital signs taken during this treatment.   After treatment:   [x]  Patient left in no apparent distress sitting up in chair  []  Patient left in no apparent distress in bed  [x]  Call bell left within reach  [x]  Nursing notified  []  Caregiver present  [x]  Chair alarm activated    COMMUNICATION/COLLABORATION:   The patients plan of care was discussed with: Registered Nurse    Jackie Booth, PT   Time Calculation: 30 mins

## 2019-01-08 NOTE — PROGRESS NOTES
Reason for Admission:  Spondylolisthesis                    RRAT Score:  7                    Plan for utilizing home health:     Yes with [facility] of Readmission:  Low                         Transition of Care Plan:    Home with home health  CM met with pt to confirm d/c plan. Pt resides with a friend in a single level home in [place]. Pt's friend is able to assist some but works. Pt is hiring a CNA to assist when his friend is not able. Pt has two daughters but both live out of town. DME at home includes a CPAP, cane and shower seat. Pt will need a rolling walker. Order received and referral placed to pt's choice, [facility]. Pt uses [facility] pharmacy in [place]. His daughter/POA, Susan Adkins, will transport him home on day of d/c.  Verner Broker, LCSW    Care Management Interventions  PCP Verified by CM: Yes(Dr. Werner Lopez; no nurse navigator)  Palliative Care Criteria Met (RRAT>21 & CHF Dx)?: No  Transition of Care Consult (CM Consult): [address]: No  Reason Outside [place]: Physician referred to specific agency  Discharge Durable Medical Equipment: No  Physical Therapy Consult: Yes  Occupational Therapy Consult: Yes  Speech Therapy Consult: No  Current Support Network:  Other(Lives with friend)  Confirm Follow Up Transport: Family  Plan discussed with Pt/Family/Caregiver: Yes  Freedom of Choice Offered: Yes  Discharge Location  Discharge Placement: Home with home health

## 2019-01-08 NOTE — PROGRESS NOTES
Bedside and Verbal shift change report given to Elysia Sin RN (oncoming nurse) by Jeannine Singh RN (offgoing nurse). Report included the following information SBAR and Kardex.

## 2019-01-08 NOTE — PERIOP NOTES
TRANSFER - OUT REPORT:    Verbal report given to 425( on Nj Horns  being transferred to 425(unit) for routine post - op       Report consisted of patients Situation, Background, Assessment and   Recommendations(SBAR). Information from the following report(s) SBAR, OR Summary, Procedure Summary, Intake/Output and MAR was reviewed with the receiving nurse. Lines:   Peripheral IV 01/07/19 Left Wrist (Active)   Site Assessment Clean, dry, & intact 1/7/2019 11:31 PM   Phlebitis Assessment 0 1/7/2019 11:31 PM   Infiltration Assessment 0 1/7/2019 11:31 PM   Dressing Status Clean, dry, & intact; New drainage; Occlusive 1/7/2019 11:31 PM   Dressing Type Tape;Transparent 1/7/2019 11:31 PM   Hub Color/Line Status Pink; Infusing 1/7/2019 11:31 PM   Action Taken Open ports on tubing capped 1/7/2019  9:43 PM   Alcohol Cap Used Yes 1/7/2019  9:43 PM        Opportunity for questions and clarification was provided.       Patient transported with:   O2 @ 2 liters  Registered Nurse

## 2019-01-08 NOTE — PROGRESS NOTES
ORTHOPAEDIC LUMBAR FUSION PROGRESS NOTE    NAME:     Nj Murdock   :       1945   MRN:       [id number]   DATE:      2019    POD:    1 Day Post-Op  S/P:    Procedure(s):  L4-S1 LAMINECTOMY, L4-L5 FUSION, INSTRUMENTATION, TLIF, BONE GRAFT    SUBJECTIVE:    C/O back pain along surgical incision  No leg pain or numbness  Denies nausea/vomiting, headache, chest pain or shortness of breath  Pain controlled    Recent Labs     19  0334   HGB 12.5      K 4.0      CO2 27   BUN 26*   CREA 1.17   *     Patient Vitals for the past 12 hrs:   BP Temp Pulse Resp SpO2   19 0322 (!) 165/95 97.6 °F (36.4 °C) 79 18 97 %   19 0222 166/85 97.8 °F (36.6 °C) 80 19 96 %   19 0128 171/85 97.5 °F (36.4 °C) 76 18 96 %   19 0025 (!) 161/92 97.6 °F (36.4 °C) 64 18 98 %   19 2330 151/76 -- 67 14 99 %   19 2317 160/74 98 °F (36.7 °C) 68 12 99 %   19 2300 159/78 -- 65 12 99 %   19 2245 162/79 -- 70 14 98 %   19 2230 150/77 -- 69 13 100 %   19 2215 148/76 -- 62 12 99 %   19 2200 142/65 -- 60 14 98 %   19 2145 141/74 -- 66 16 98 %   19 2133 -- 97.9 °F (36.6 °C) -- -- --   19 2130 143/75 -- 64 13 99 %   19 2115 137/66 -- 64 12 100 %   19 145/59 -- 61 14 99 %   19 2045 140/71 -- 60 (!) 7 99 %   19 129/65 -- 60 14 100 %   19 137/66 -- 61 13 98 %   19 134/75 -- (!) 57 8 99 %   19 134/77 -- (!) 58 9 97 %   19 126/69 -- 63 18 99 %   19 126/70 -- 62 11 99 %   19 122/60 98 °F (36.7 °C) 60 12 93 %       EXAM:  Dressings clean, dry and intact   Positive strength/ROM bilat lower ext.   Neuro intact to sensation  Calves, soft & nontender  BL LEs NVID      PLAN:  D/C PCA, change to PO pain medications  PT/OT, OOB w/ assist  Advance diet as tolerated      Karolina Knapp [place]  Orthopaedic Surgery  Physician Assistant to Dr. Alonso Courtney

## 2019-01-08 NOTE — PROGRESS NOTES
Problem: Falls - Risk of  Goal: *Absence of Falls  Document Teresa Fall Risk and appropriate interventions in the flowsheet.   Outcome: Progressing Towards Goal  Fall Risk Interventions:  Mobility Interventions: Bed/chair exit alarm, OT consult for ADLs, Patient to call before getting OOB, PT Consult for mobility concerns, PT Consult for assist device competence, Utilize walker, cane, or other assistive device         Medication Interventions: Bed/chair exit alarm, Patient to call before getting OOB, Teach patient to arise slowly         History of Falls Interventions: Bed/chair exit alarm, Utilize gait belt for transfer/ambulation, Door open when patient unattended

## 2019-01-08 NOTE — DIABETES MGMT
DTC Consult Note    Recommendations/ Comments: BG's in range. POD 1 spinal surgery    No recommendations at this time. DTC will continue to follow    Current hospital DM medication:  Lispro correction scale, normal sensitivity  Actos 15 mg each AM  Metformin 500 mg each AM    DTC will continue to follow patient as needed. ____________________________    Consult received for:   []           Hospital Medication Recommendations                 [x]           Hospital Blood Glucose Management    Chart reviewed and initial evaluation complete on Leonard Rivera. Patient is a 72 y.o. male with known DM on Actoplus  mg daily at home      A1c:   Lab Results   Component Value Date/Time    Hemoglobin A1c 6.5 (H) 12/28/2018 04:00 PM       Recent Glucose Results:   Lab Results   Component Value Date/Time     (H) 01/08/2019 03:34 AM    GLUCPOC 140 (H) 01/08/2019 11:35 AM    GLUCPOC 147 (H) 01/07/2019 09:22 PM    GLUCPOC 116 (H) 01/07/2019 03:29 PM        Lab Results   Component Value Date/Time    Creatinine 1.17 01/08/2019 03:34 AM       Active Orders   Diet    DIET DIABETIC CONSISTENT CARB Regular        PO intake: No data found. Thank you.   Helder De La Rosa RN, [address]  Pager [phone]    Time spent: 6 min

## 2019-01-08 NOTE — PROGRESS NOTES
Problem: Self Care Deficits Care Plan (Adult)  Goal: *Acute Goals and Plan of Care (Insert Text)  Occupational Therapy Goals  Initiated 1/8/2019    1. Patient will perform lower body dressing with modified independence using Reacher and Stocking Aid PRN within 7 days. 2.  Patient will perform toileting with modified independence using most appropriate DME within 7 days. 3.  Patient will grooming at modified independence within 7 days, standing at sink. 4.  Patient will don/doff back brace at modified independence within 7 days. 5.  Patient will verbalize/demonstrate 3/3 back precautions during ADL tasks without cues within 7 days. Occupational Therapy EVALUATION  Patient: Arcadio Zhang (77 y.o. male)  Date: 1/8/2019  Primary Diagnosis: Spondylolisthesis of lumbar region [M43.16]  Pseudoclaudication syndrome [M48.062]  Procedure(s) (LRB):  L4-S1 LAMINECTOMY, L4-L5 FUSION, INSTRUMENTATION, TLIF, BONE GRAFT (N/A) 1 Day Post-Op   Precautions:   Fall, Back(LSO when OOB)    ASSESSMENT :  Based on the objective data described below, the patient presents at min to mod assist level with don/doffing brace, LB self-care using AE and toileting (need to prevent twisting). Feel pt would benefit from 1-2 OT visit to achieve mod I level. Pt lives with  girlfriend who works during the day, but pt plans on hiring CNA to assist him until he is ok to be alone. Will con't to follow and address listed goals. Patient will benefit from skilled intervention to address the above impairments.   Patients rehabilitation potential is considered to be Excellent  Factors which may influence rehabilitation potential include:   [x]             None noted  []             Mental ability/status  []             Medical condition  []             Home/family situation and support systems  []             Safety awareness  []             Pain tolerance/management  []             Other:      PLAN :  Recommendations and Planned Interventions:  [x]               Self Care Training                  [x]        Therapeutic Activities  [x]               Functional Mobility Training    []        Cognitive Retraining  []               Therapeutic Exercises           [x]        Endurance Activities  []               Balance Training                   []        Neuromuscular Re-Education  []               Visual/Perceptual Training     [x]   Home Safety Training  [x]               Patient Education                 [x]        Family Training/Education  []               Other (comment):    Frequency/Duration: Patient will be followed by occupational therapy 5 times a week to address goals. Discharge Recommendations: Home Health  Further Equipment Recommendations for Discharge: AE for LB self-care, per pt, has a reacher     SUBJECTIVE:   Patient stated I plan on hiring someone.     OBJECTIVE DATA SUMMARY:   HISTORY:   Past Medical History:   Diagnosis Date    Chronic kidney disease     Stage 2    [facility] mellitus with stage 2 [facility]     High cholesterol     Morbid obesity with BMI of 40.0-44.9, adult ([facility]     MRSA infection 2016    Abcess on abdomen    Sleep apnea with use of continuous positive airway pressure (CPAP)     Tinnitus aurium, bilateral 2018     Past Surgical History:   Procedure Laterality Date    HX CERVICAL FUSION N/A 2017    HX CHOLECYSTECTOMY      HX COLONOSCOPY N/A     HX CYST INCISION AND DRAINAGE N/A 2015    Cyst on abdomen    HX TONSILLECTOMY N/A        Prior Level of Function/Environment/Context: independent, retired  Occupations in which the patient is/was successful, what are the barriers preventing that success:   Performance Patterns (routines, roles, habits, and rituals):   Personal Interests and/or values:   Expanded or extensive additional review of patient history:     Home Situation  # Steps to Enter: 2  Rails to Enter: Yes  One/Two Story Residence: One story  Patient Expects to be Discharged to[de-identified] Private residence        EXAMINATION OF PERFORMANCE DEFICITS:  Cognitive/Behavioral Status:  Neurologic State: Alert  Orientation Level: Oriented X4  Cognition: Appropriate decision making        Safety/Judgement: Good awareness of safety precautions    Skin: intact    Edema: none noted    Hearing: Auditory  Auditory Impairment: None    Vision/Perceptual:                                     Range of Motion:    AROM: Within functional limits                         Strength:    Strength: Within functional limits                Coordination:     Fine Motor Skills-Upper: Left Intact; Right Intact    Gross Motor Skills-Upper: Left Intact; Right Intact    Tone & Sensation:    Tone: Normal  Sensation: Intact                      Balance:  Sitting: Intact  Standing: Intact; With support    Functional Mobility and Transfers for ADLs:  Bed Mobility:  Rolling: Contact guard assistance; Additional time  Supine to Sit: Moderate assistance  Scooting: Modified independent    Transfers:  Sit to Stand: Contact guard assistance;Assist x1  Stand to Sit: Contact guard assistance;Assist x1  Bed to Chair: Contact guard assistance  Bathroom Mobility: Contact guard assistance  Toilet Transfer : Contact guard assistance    ADL Assessment:  Feeding: Independent    Oral Facial Hygiene/Grooming: Setup    Bathing: Moderate assistance    Upper Body Dressing: Moderate assistance;Minimum assistance    Lower Body Dressing: Minimum assistance    Toileting: Minimum assistance                ADL Intervention and task modifications:     Performed LB self-care using AE, would benefit from further training.                                  Cognitive Retraining  Safety/Judgement: Good awareness of safety precautions      Functional Measure:  Barthel Index:    Bathin  Bladder: 10  Bowels: 10  Groomin  Dressin  Feeding: 10  Mobility: 5  Stairs: 5  Toilet Use: 5  Transfer (Bed to Chair and Back): 5  Total: 60        The Barthel ADL Index: Guidelines  1. The index should be used as a record of what a patient does, not as a record of what a patient could do. 2. The main aim is to establish degree of independence from any help, physical or verbal, however minor and for whatever reason. 3. The need for supervision renders the patient not independent. 4. A patient's performance should be established using the best available evidence. Asking the patient, friends/relatives and nurses are the usual sources, but direct observation and common sense are also important. However direct testing is not needed. 5. Usually the patient's performance over the preceding 24-48 hours is important, but occasionally longer periods will be relevant. 6. Middle categories imply that the patient supplies over 50 per cent of the effort. 7. Use of aids to be independent is allowed. Guerline Cho., Barthel, D.W. (7269). Functional evaluation: the Barthel Index. [address] [facility] (14)2. ROSIE Aponte, Stella Moscoso., Joana Osler., [place], [address] [place] [address] (1999). Measuring the change indisability after inpatient rehabilitation; comparison of the responsiveness of the Barthel Index and Functional [place] Measure. Journal of Neurology, Neurosurgery, and Psychiatry, 66(4), 885-931. Nickie Shen, [place].LUCY, RAJESH Ureña, & Brandi Bender MMACK. (2004.) Assessment of post-stroke quality of life in cost-effectiveness studies: The usefulness of the Barthel Index and the EuroQoL-5D.  Quality of Life Research, 15, 367-49       Occupational Therapy Evaluation Charge Determination   History Examination Decision-Making   LOW Complexity : Brief history review  LOW Complexity : 1-3 performance deficits relating to physical, cognitive , or psychosocial skils that result in activity limitations and / or participation restrictions  LOW Complexity : No comorbidities that affect functional and no verbal or physical assistance needed to complete eval tasks       Based on the above components, the patient evaluation is determined to be of the following complexity level: LOW   Pain:  Pain Scale 1: Numeric (0 - 10)  Pain Intensity 1: 0  Pain Location 1: Back  Pain Orientation 1: Lower  Pain Description 1: Aching  Pain Intervention(s) 1: Encouraged PCA  Activity Tolerance:   Good  Please refer to the flowsheet for vital signs taken during this treatment. After treatment:   [x] Patient left in no apparent distress sitting up in chair  [] Patient left in no apparent distress in bed  [x] Call bell left within reach  [] Nursing notified  [] Caregiver present  [x]Chair alarm activated    COMMUNICATION/EDUCATION:   The patients plan of care was discussed with: Physical Therapist.  [x] Home safety education was provided and the patient/caregiver indicated understanding. [x] Patient/family have participated as able in goal setting and plan of care. [x] Patient/family agree to work toward stated goals and plan of care. [] Patient understands intent and goals of therapy, but is neutral about his/her participation. [] Patient is unable to participate in goal setting and plan of care. This patients plan of care is appropriate for delegation to [facility].     Thank you for this referral.  Armida Hodges, OTR/L  Time Calculation: 15 mins

## 2019-01-08 NOTE — ANESTHESIA POSTPROCEDURE EVALUATION
Procedure(s):  L4-S1 LAMINECTOMY, L4-L5 FUSION, INSTRUMENTATION, TLIF, BONE GRAFT.     Anesthesia Post Evaluation      Multimodal analgesia: multimodal analgesia used between 6 hours prior to anesthesia start to PACU discharge  Patient location during evaluation: PACU  Patient participation: complete - patient participated  Level of consciousness: sleepy but conscious  Pain score: 1  Pain management: adequate  Airway patency: patent  Anesthetic complications: no  Cardiovascular status: acceptable, blood pressure returned to baseline and hemodynamically stable  Respiratory status: acceptable  Hydration status: acceptable  Post anesthesia nausea and vomiting:  none      Visit Vitals  /75   Pulse (!) 57   Temp 36.7 °C (98 °F)   Resp 8   Ht 5' 6\" (1.676 m)   Wt 120.9 kg (266 lb 8.6 oz)   SpO2 99%   BMI 43.02 kg/m²
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
## 2019-01-08 NOTE — PROGRESS NOTES
Problem: Pressure Injury - Risk of  Goal: *Prevention of pressure injury  Document Reji Scale and appropriate interventions in the flowsheet. Outcome: Progressing Towards Goal  Pressure Injury Interventions:  Sensory Interventions: Assess changes in LOC, Discuss PT/OT consult with provider, Keep linens dry and wrinkle-free, Minimize linen layers, Pressure redistribution bed/mattress (bed type), Turn and reposition approx.  every two hours (pillows and wedges if needed)    Moisture Interventions: Absorbent underpads, Check for incontinence Q2 hours and as needed, Minimize layers, Offer toileting Q_hr, Internal/External urinary devices    Activity Interventions: Increase time out of bed, Pressure redistribution bed/mattress(bed type), PT/OT evaluation    Mobility Interventions: HOB 30 degrees or less, Pressure redistribution bed/mattress (bed type), PT/OT evaluation    Nutrition Interventions: Document food/fluid/supplement intake, Offer support with meals,snacks and hydration    Friction and Shear Interventions: HOB 30 degrees or less, Minimize layers

## 2019-01-08 NOTE — PROGRESS NOTES
Spiritual Care Assessment/Progress Note  [address]      NAME: Marylu Chapa      MRN: [id number]  AGE: 72 y.o.  SEX: male  Yazidism Affiliation: Druze   Language: English     1/8/2019     Total Time (in minutes): 10     Spiritual Assessment begun in [facility] 4M POST SURG ORT 2 through conversation with:         [x]Patient        [] Family    [] Friend(s)        Reason for Consult: Initial/Spiritual assessment, patient floor, Request by patient     Spiritual beliefs: (Please include comment if needed)     [x] Identifies with a hina tradition: Druze        [] Supported by a hina community:            [] Claims no spiritual orientation:           [] Seeking spiritual identity:                [] Adheres to an individual form of spirituality:           [] Not able to assess:                           Identified resources for coping:      [x] Prayer                               [] Music                  [] Guided Imagery     [] Family/friends                 [] Pet visits     [] Devotional reading                         [] Unknown     [] Other:                                              Interventions offered during this visit: (See comments for more details)    Patient Interventions: Affirmation of emotions/emotional suffering, Coping skills reviewed/reinforced, Iconic (affirming the presence of God/Higher Power), Prayer (actual)           Plan of Care:     [x] Support spiritual and/or cultural needs    [] Support AMD and/or advance care planning process      [] Support grieving process   [] Coordinate Rites and/or Rituals    [] Coordination with community clergy   [] No spiritual needs identified at this time   [] Detailed Plan of Care below (See Comments)  [] Make referral to Music Therapy  [] Make referral to Pet Therapy     [] Make referral to Addiction services  [] Make referral to [facility]  [] Make referral to Spiritual Care Partner  [] No future visits requested        [] Follow up visits as needed     Comments: Patient sitting up in bed eating lunch. Good eye contact, friendly, good natured. Says he wished to speak to a  for prayer. Provided spiritual presence and listening as he talked about his back surgery and the events leading to this hospitalization,  He also provided a brief history of his walk of hina and requested prayer saying that he finds great comfort in prayer. He recited Psa 23, offered a prayer, and I concluded with prayer. He appeared comforted and encouraged as a result of this visit and expressed gratitude for si prayer and visit. Visited by Rev. Kory Agustin, [address] [place] [address] paging service: [phone])

## 2019-01-09 LAB
ANION GAP SERPL CALC-SCNC: 9 MMOL/L (ref 5–15)
BUN SERPL-MCNC: 29 MG/DL (ref 6–20)
BUN/CREAT SERPL: 21 (ref 12–20)
CALCIUM SERPL-MCNC: 8.3 MG/DL (ref 8.5–10.1)
CHLORIDE SERPL-SCNC: 102 MMOL/L (ref 97–108)
CO2 SERPL-SCNC: 24 MMOL/L (ref 21–32)
CREAT SERPL-MCNC: 1.36 MG/DL (ref 0.7–1.3)
GLUCOSE BLD STRIP.AUTO-MCNC: 123 MG/DL (ref 65–100)
GLUCOSE BLD STRIP.AUTO-MCNC: 149 MG/DL (ref 65–100)
GLUCOSE BLD STRIP.AUTO-MCNC: 166 MG/DL (ref 65–100)
GLUCOSE BLD STRIP.AUTO-MCNC: 236 MG/DL (ref 65–100)
GLUCOSE SERPL-MCNC: 136 MG/DL (ref 65–100)
HGB BLD-MCNC: 9.4 G/DL (ref 12.1–17)
POTASSIUM SERPL-SCNC: 3.8 MMOL/L (ref 3.5–5.1)
SERVICE CMNT-IMP: ABNORMAL
SODIUM SERPL-SCNC: 135 MMOL/L (ref 136–145)

## 2019-01-09 PROCEDURE — 97116 GAIT TRAINING THERAPY: CPT

## 2019-01-09 PROCEDURE — 65270000029 HC RM PRIVATE

## 2019-01-09 PROCEDURE — 94760 N-INVAS EAR/PLS OXIMETRY 1: CPT

## 2019-01-09 PROCEDURE — 82962 GLUCOSE BLOOD TEST: CPT

## 2019-01-09 PROCEDURE — 36415 COLL VENOUS BLD VENIPUNCTURE: CPT

## 2019-01-09 PROCEDURE — 74011636637 HC RX REV CODE- 636/637: Performed by: ORTHOPAEDIC SURGERY

## 2019-01-09 PROCEDURE — 80048 BASIC METABOLIC PNL TOTAL CA: CPT

## 2019-01-09 PROCEDURE — 74011000250 HC RX REV CODE- 250: Performed by: ORTHOPAEDIC SURGERY

## 2019-01-09 PROCEDURE — 74011250637 HC RX REV CODE- 250/637: Performed by: NURSE PRACTITIONER

## 2019-01-09 PROCEDURE — 97535 SELF CARE MNGMENT TRAINING: CPT

## 2019-01-09 PROCEDURE — 74011250636 HC RX REV CODE- 250/636: Performed by: NURSE PRACTITIONER

## 2019-01-09 PROCEDURE — 74011250637 HC RX REV CODE- 250/637: Performed by: PHYSICIAN ASSISTANT

## 2019-01-09 PROCEDURE — 74011250636 HC RX REV CODE- 250/636: Performed by: ORTHOPAEDIC SURGERY

## 2019-01-09 PROCEDURE — 74011250636 HC RX REV CODE- 250/636: Performed by: PHYSICIAN ASSISTANT

## 2019-01-09 PROCEDURE — 97530 THERAPEUTIC ACTIVITIES: CPT

## 2019-01-09 PROCEDURE — 74011250637 HC RX REV CODE- 250/637: Performed by: ORTHOPAEDIC SURGERY

## 2019-01-09 PROCEDURE — 85018 HEMOGLOBIN: CPT

## 2019-01-09 RX ORDER — DEXAMETHASONE SODIUM PHOSPHATE 4 MG/ML
6 INJECTION, SOLUTION INTRA-ARTICULAR; INTRALESIONAL; INTRAMUSCULAR; INTRAVENOUS; SOFT TISSUE ONCE
Status: COMPLETED | OUTPATIENT
Start: 2019-01-09 | End: 2019-01-09

## 2019-01-09 RX ORDER — GABAPENTIN 300 MG/1
300 CAPSULE ORAL 2 TIMES DAILY
Status: DISCONTINUED | OUTPATIENT
Start: 2019-01-09 | End: 2019-01-11 | Stop reason: HOSPADM

## 2019-01-09 RX ORDER — HYDROMORPHONE HYDROCHLORIDE 2 MG/1
4 TABLET ORAL
Status: DISCONTINUED | OUTPATIENT
Start: 2019-01-09 | End: 2019-01-11 | Stop reason: HOSPADM

## 2019-01-09 RX ORDER — HYDROMORPHONE HYDROCHLORIDE 2 MG/1
2 TABLET ORAL
Status: DISCONTINUED | OUTPATIENT
Start: 2019-01-09 | End: 2019-01-11 | Stop reason: HOSPADM

## 2019-01-09 RX ORDER — SODIUM CHLORIDE 9 MG/ML
75 INJECTION, SOLUTION INTRAVENOUS CONTINUOUS
Status: DISCONTINUED | OUTPATIENT
Start: 2019-01-09 | End: 2019-01-11 | Stop reason: HOSPADM

## 2019-01-09 RX ADMIN — ACETAMINOPHEN 650 MG: 325 TABLET, FILM COATED ORAL at 21:15

## 2019-01-09 RX ADMIN — STANDARDIZED SENNA CONCENTRATE AND DOCUSATE SODIUM 1 TABLET: 8.6; 5 TABLET, FILM COATED ORAL at 16:28

## 2019-01-09 RX ADMIN — OXYCODONE HYDROCHLORIDE 10 MG: 5 TABLET ORAL at 08:15

## 2019-01-09 RX ADMIN — ATORVASTATIN CALCIUM 20 MG: 20 TABLET, FILM COATED ORAL at 21:15

## 2019-01-09 RX ADMIN — LISINOPRIL 40 MG: 20 TABLET ORAL at 08:14

## 2019-01-09 RX ADMIN — GABAPENTIN 600 MG: 100 CAPSULE ORAL at 21:16

## 2019-01-09 RX ADMIN — HYDROMORPHONE HYDROCHLORIDE 4 MG: 2 TABLET ORAL at 18:24

## 2019-01-09 RX ADMIN — METFORMIN HYDROCHLORIDE 500 MG: 500 TABLET ORAL at 08:14

## 2019-01-09 RX ADMIN — HYDROMORPHONE HYDROCHLORIDE 4 MG: 2 TABLET ORAL at 22:37

## 2019-01-09 RX ADMIN — AMLODIPINE BESYLATE 10 MG: 5 TABLET ORAL at 08:14

## 2019-01-09 RX ADMIN — SODIUM CHLORIDE 75 ML/HR: 900 INJECTION, SOLUTION INTRAVENOUS at 08:00

## 2019-01-09 RX ADMIN — HYDROMORPHONE HYDROCHLORIDE 0.5 MG: 2 INJECTION, SOLUTION INTRAMUSCULAR; INTRAVENOUS; SUBCUTANEOUS at 00:00

## 2019-01-09 RX ADMIN — HYDROCHLOROTHIAZIDE 25 MG: 25 TABLET ORAL at 08:14

## 2019-01-09 RX ADMIN — CARVEDILOL 25 MG: 12.5 TABLET, FILM COATED ORAL at 08:14

## 2019-01-09 RX ADMIN — PIOGLITAZONE 15 MG: 15 TABLET ORAL at 08:15

## 2019-01-09 RX ADMIN — ACETAMINOPHEN 650 MG: 325 TABLET, FILM COATED ORAL at 11:12

## 2019-01-09 RX ADMIN — GABAPENTIN 300 MG: 300 CAPSULE ORAL at 16:21

## 2019-01-09 RX ADMIN — DEXAMETHASONE SODIUM PHOSPHATE 6 MG: 4 INJECTION, SOLUTION INTRAMUSCULAR; INTRAVENOUS at 14:45

## 2019-01-09 RX ADMIN — VITAMIN D, TAB 1000IU (100/BT) 2000 UNITS: 25 TAB at 08:15

## 2019-01-09 RX ADMIN — HYDROMORPHONE HYDROCHLORIDE 4 MG: 2 TABLET ORAL at 11:12

## 2019-01-09 RX ADMIN — POLYETHYLENE GLYCOL 3350 17 G: 17 POWDER, FOR SOLUTION ORAL at 08:15

## 2019-01-09 RX ADMIN — CARVEDILOL 25 MG: 12.5 TABLET, FILM COATED ORAL at 16:21

## 2019-01-09 RX ADMIN — FAMOTIDINE 20 MG: 20 TABLET ORAL at 16:28

## 2019-01-09 RX ADMIN — STANDARDIZED SENNA CONCENTRATE AND DOCUSATE SODIUM 1 TABLET: 8.6; 5 TABLET, FILM COATED ORAL at 08:14

## 2019-01-09 RX ADMIN — FAMOTIDINE 20 MG: 20 TABLET ORAL at 08:14

## 2019-01-09 RX ADMIN — Medication 10 ML: at 21:16

## 2019-01-09 RX ADMIN — INSULIN LISPRO 2 UNITS: 100 INJECTION, SOLUTION INTRAVENOUS; SUBCUTANEOUS at 16:28

## 2019-01-09 RX ADMIN — Medication 10 ML: at 06:00

## 2019-01-09 RX ADMIN — SODIUM CHLORIDE 75 ML/HR: 900 INJECTION, SOLUTION INTRAVENOUS at 22:39

## 2019-01-09 NOTE — PROGRESS NOTES
1/9/2019 6:02 PM EMR reviewed, planning for pt to discharge home with home health through [facility] [address] when medically stable. CM will follow.  TANA Shipman

## 2019-01-09 NOTE — PROGRESS NOTES
ORTHOPAEDIC LUMBAR FUSION PROGRESS NOTE    NAME:     Leticia Martin   :       1945   MRN:       [id number]   DATE:      2019    POD:    2 Days Post-Op  S/P:    Procedure(s):  L4-S1 LAMINECTOMY, L4-L5 FUSION, INSTRUMENTATION, TLIF, BONE GRAFT    SUBJECTIVE:    C/O back pain along surgical incision  No leg pain or numbness  Denies nausea/vomiting, headache, chest pain or shortness of breath      Recent Labs     19  0342   HGB 9.4*   *   K 3.8      CO2 24   BUN 29*   CREA 1.36*   *     Patient Vitals for the past 12 hrs:   BP Temp Pulse Resp SpO2   19 0105 134/63 100 °F (37.8 °C) 72 18 93 %   19 1938 127/75 98.7 °F (37.1 °C) 73 18 94 %       EXAM:  Small amnt of old sanguinous drainage on dressing. Dressing intact  Positive strength/ROM bilat lower ext.   Neuro intact to sensation  Calves, soft & nontender  BL LEs NVID      PLAN:  Change dressing  Continue IV fluids  Continue prn PO pain medications- changed to Dilaudid for better pain control  PT/OT, OOB w/ assist  Tolerating diet      Roc Safe, [address]  Orthopaedic Surgery  Physician Assistant to Dr. Harsh Deleon

## 2019-01-09 NOTE — PROGRESS NOTES
Problem: Falls - Risk of  Goal: *Absence of Falls  Document Teresa Fall Risk and appropriate interventions in the flowsheet. Outcome: Progressing Towards Goal  Fall Risk Interventions:  Mobility Interventions: Bed/chair exit alarm, OT consult for ADLs, Patient to call before getting OOB, PT Consult for mobility concerns, PT Consult for assist device competence, Utilize walker, cane, or other assistive device         Medication Interventions: Bed/chair exit alarm, Patient to call before getting OOB, Teach patient to arise slowly    Elimination Interventions: Bed/chair exit alarm, Call light in reach, Patient to call for help with toileting needs, Toileting schedule/hourly rounds, Urinal in reach    History of Falls Interventions: Utilize gait belt for transfer/ambulation, Bed/chair exit alarm        Problem: Pressure Injury - Risk of  Goal: *Prevention of pressure injury  Document Reji Scale and appropriate interventions in the flowsheet. Outcome: Progressing Towards Goal  Pressure Injury Interventions:  Sensory Interventions: Assess changes in LOC, Discuss PT/OT consult with provider, Keep linens dry and wrinkle-free, Minimize linen layers, Pressure redistribution bed/mattress (bed type), Turn and reposition approx.  every two hours (pillows and wedges if needed)    Moisture Interventions: Absorbent underpads, Check for incontinence Q2 hours and as needed, Minimize layers, Offer toileting Q_hr    Activity Interventions: Increase time out of bed, PT/OT evaluation, Pressure redistribution bed/mattress(bed type)    Mobility Interventions: HOB 30 degrees or less, Pressure redistribution bed/mattress (bed type), PT/OT evaluation    Nutrition Interventions: Document food/fluid/supplement intake, Offer support with meals,snacks and hydration    Friction and Shear Interventions: HOB 30 degrees or less, Minimize layers

## 2019-01-09 NOTE — PROGRESS NOTES
Attempted to work with the patient for Physical Therapy, chart reviewed and discussed with nurse patient working with OT and in a lot of pain patient requested to check later. Spoke with the nurse and she can medicate patient now. We will come back after 30 minutes when pain med take effect. Thank you.

## 2019-01-10 LAB
ANION GAP SERPL CALC-SCNC: 12 MMOL/L (ref 5–15)
BUN SERPL-MCNC: 26 MG/DL (ref 6–20)
BUN/CREAT SERPL: 23 (ref 12–20)
CALCIUM SERPL-MCNC: 9.2 MG/DL (ref 8.5–10.1)
CHLORIDE SERPL-SCNC: 104 MMOL/L (ref 97–108)
CO2 SERPL-SCNC: 23 MMOL/L (ref 21–32)
CREAT SERPL-MCNC: 1.15 MG/DL (ref 0.7–1.3)
GLUCOSE BLD STRIP.AUTO-MCNC: 138 MG/DL (ref 65–100)
GLUCOSE BLD STRIP.AUTO-MCNC: 147 MG/DL (ref 65–100)
GLUCOSE BLD STRIP.AUTO-MCNC: 171 MG/DL (ref 65–100)
GLUCOSE BLD STRIP.AUTO-MCNC: 187 MG/DL (ref 65–100)
GLUCOSE SERPL-MCNC: 193 MG/DL (ref 65–100)
HGB BLD-MCNC: 9.8 G/DL (ref 12.1–17)
POTASSIUM SERPL-SCNC: 3.9 MMOL/L (ref 3.5–5.1)
SERVICE CMNT-IMP: ABNORMAL
SODIUM SERPL-SCNC: 139 MMOL/L (ref 136–145)

## 2019-01-10 PROCEDURE — 97530 THERAPEUTIC ACTIVITIES: CPT

## 2019-01-10 PROCEDURE — 82962 GLUCOSE BLOOD TEST: CPT

## 2019-01-10 PROCEDURE — 97116 GAIT TRAINING THERAPY: CPT

## 2019-01-10 PROCEDURE — 74011250637 HC RX REV CODE- 250/637: Performed by: PHYSICIAN ASSISTANT

## 2019-01-10 PROCEDURE — 97535 SELF CARE MNGMENT TRAINING: CPT

## 2019-01-10 PROCEDURE — 74011250637 HC RX REV CODE- 250/637: Performed by: NURSE PRACTITIONER

## 2019-01-10 PROCEDURE — 94760 N-INVAS EAR/PLS OXIMETRY 1: CPT

## 2019-01-10 PROCEDURE — 80048 BASIC METABOLIC PNL TOTAL CA: CPT

## 2019-01-10 PROCEDURE — 36415 COLL VENOUS BLD VENIPUNCTURE: CPT

## 2019-01-10 PROCEDURE — 74011636637 HC RX REV CODE- 636/637: Performed by: ORTHOPAEDIC SURGERY

## 2019-01-10 PROCEDURE — 74011000250 HC RX REV CODE- 250: Performed by: ORTHOPAEDIC SURGERY

## 2019-01-10 PROCEDURE — 85018 HEMOGLOBIN: CPT

## 2019-01-10 PROCEDURE — 65270000029 HC RM PRIVATE

## 2019-01-10 PROCEDURE — 74011250637 HC RX REV CODE- 250/637: Performed by: ORTHOPAEDIC SURGERY

## 2019-01-10 RX ADMIN — METFORMIN HYDROCHLORIDE 500 MG: 500 TABLET ORAL at 08:00

## 2019-01-10 RX ADMIN — HYDROMORPHONE HYDROCHLORIDE 2 MG: 2 TABLET ORAL at 07:11

## 2019-01-10 RX ADMIN — HYDROMORPHONE HYDROCHLORIDE 2 MG: 2 TABLET ORAL at 03:18

## 2019-01-10 RX ADMIN — HYDROMORPHONE HYDROCHLORIDE 4 MG: 2 TABLET ORAL at 17:28

## 2019-01-10 RX ADMIN — STANDARDIZED SENNA CONCENTRATE AND DOCUSATE SODIUM 1 TABLET: 8.6; 5 TABLET, FILM COATED ORAL at 08:18

## 2019-01-10 RX ADMIN — FAMOTIDINE 20 MG: 20 TABLET ORAL at 08:17

## 2019-01-10 RX ADMIN — STANDARDIZED SENNA CONCENTRATE AND DOCUSATE SODIUM 1 TABLET: 8.6; 5 TABLET, FILM COATED ORAL at 17:28

## 2019-01-10 RX ADMIN — AMLODIPINE BESYLATE 10 MG: 5 TABLET ORAL at 08:18

## 2019-01-10 RX ADMIN — VITAMIN D, TAB 1000IU (100/BT) 2000 UNITS: 25 TAB at 08:19

## 2019-01-10 RX ADMIN — HYDROMORPHONE HYDROCHLORIDE 4 MG: 2 TABLET ORAL at 23:20

## 2019-01-10 RX ADMIN — ATORVASTATIN CALCIUM 20 MG: 20 TABLET, FILM COATED ORAL at 23:20

## 2019-01-10 RX ADMIN — GABAPENTIN 600 MG: 100 CAPSULE ORAL at 23:20

## 2019-01-10 RX ADMIN — Medication 10 ML: at 23:21

## 2019-01-10 RX ADMIN — CARVEDILOL 25 MG: 12.5 TABLET, FILM COATED ORAL at 08:18

## 2019-01-10 RX ADMIN — LISINOPRIL 40 MG: 20 TABLET ORAL at 08:18

## 2019-01-10 RX ADMIN — Medication 10 ML: at 17:30

## 2019-01-10 RX ADMIN — HYDROMORPHONE HYDROCHLORIDE 4 MG: 2 TABLET ORAL at 12:27

## 2019-01-10 RX ADMIN — PIOGLITAZONE 15 MG: 15 TABLET ORAL at 08:19

## 2019-01-10 RX ADMIN — POLYETHYLENE GLYCOL 3350 17 G: 17 POWDER, FOR SOLUTION ORAL at 08:19

## 2019-01-10 RX ADMIN — HYDROCHLOROTHIAZIDE 25 MG: 25 TABLET ORAL at 08:18

## 2019-01-10 RX ADMIN — INSULIN LISPRO 2 UNITS: 100 INJECTION, SOLUTION INTRAVENOUS; SUBCUTANEOUS at 08:19

## 2019-01-10 RX ADMIN — CARVEDILOL 25 MG: 12.5 TABLET, FILM COATED ORAL at 17:28

## 2019-01-10 RX ADMIN — FAMOTIDINE 20 MG: 20 TABLET ORAL at 17:28

## 2019-01-10 RX ADMIN — Medication 10 ML: at 07:11

## 2019-01-10 RX ADMIN — GABAPENTIN 300 MG: 300 CAPSULE ORAL at 17:29

## 2019-01-10 RX ADMIN — GABAPENTIN 300 MG: 300 CAPSULE ORAL at 08:18

## 2019-01-10 NOTE — PROGRESS NOTES
Problem: Mobility Impaired (Adult and Pediatric)  Goal: *Acute Goals and Plan of Care (Insert Text)  Physical Therapy Goals  Initiated 1/8/2019    1. Patient will move from supine to sit and sit to supine  in bed with modified independence within 4 days. 2. Patient will perform sit to stand with modified independence within 4 days. 3. Patient will ambulate with modified independence for 100 feet with the least restrictive device within 4 days. 4. Patient will ascend/descend 4 stairs with 1 handrail(s) with minimal assistance/contact guard assist within 4 days. 5. Patient will verbalize and demonstrate understanding of spinal precautions (No bending, lifting greater than 5 lbs, or twisting; log-roll technique; frequent repositioning as instructed) within 4 days. physical Therapy TREATMENT  Patient: Leticia Martin (77 y.o. male)  Date: 1/10/2019  Diagnosis: Spondylolisthesis of lumbar region [M43.16]  Pseudoclaudication syndrome [M48.062] <principal problem not specified>  Procedure(s) (LRB):  L4-S1 LAMINECTOMY, L4-L5 FUSION, INSTRUMENTATION, TLIF, BONE GRAFT (N/A) 3 Days Post-Op  Precautions: Fall, Back(LSO when OOB)  Chart, physical therapy assessment, plan of care and goals were reviewed. ASSESSMENT:  Based on the objective data described below, patient participated well with treatment today. Patient complaining of right knee pain today. Nurse medicated patient already and monitoring him pain level. Rolled on the edge of bed with min assist, supine to sit min assist, sit to stand CGA, ambulate with rolling walker out on the 4th floor hallway CGA, decreased pace with antalgic gait pattern noted during ambulation. Sitting and standing balance good. OOB to chair as tolerated performed some active range of motion exercise on both LE while up on the chair. Reviewed back precautions and donning of back brace verbalized understanding.     Progression toward goals:  [x]      Improving appropriately and progressing toward goals  []      Improving slowly and progressing toward goals  []      Not making progress toward goals and plan of care will be adjusted     PLAN:  Patient continues to benefit from skilled intervention to address the above impairments. Continue treatment per established plan of care. Discharge Recommendations:  Please refer to MD's recommendations for disposition  Further Equipment Recommendations for Discharge:  TBD     SUBJECTIVE:   Patient stated My knee hurts today.    The patient stated 3/3 back precautions. Reviewed all 3 with patient. OBJECTIVE DATA SUMMARY:   Critical Behavior:  Neurologic State: Alert  Orientation Level: Oriented X4  Cognition: Appropriate decision making  Safety/Judgement: Good awareness of safety precautions  Functional Mobility Training:  Bed Mobility:  Log Rolling: Minimum assistance  Supine to Sit: Minimum assistance  Sit to Supine: Minimum assistance  Scooting: Minimum assistance        Brace donned with  supervision/set-up   Transfers:  Sit to Stand: Contact guard assistance  Stand to Sit: Contact guard assistance  Stand Pivot Transfers: Contact guard assistance     Bed to Chair: Contact guard assistance                    Balance:  Sitting: Intact; High guard  Standing: Impaired; With support  Standing - Static: Good  Standing - Dynamic : Fair  Ambulation/Gait Training:  Distance (ft): 120 Feet (ft)  Assistive Device: Walker, rolling;Gait belt;Brace/Splint  Ambulation - Level of Assistance: Contact guard assistance     Gait Description (WDL): Exceptions to WDL  Gait Abnormalities: Antalgic; Path deviations; Step to gait        Base of Support: Widened     Speed/Brenda: Pace decreased (<100 feet/min)                    Therapeutic Exercises:    Instructed patient to continue active range of motion exercise on both legs while up on chair or on bed.      Pain:  Pain Scale 1: Numeric (0 - 10)  Pain Intensity 1: 4  Pain Location 1: Back  Pain Orientation 1: Posterior  Pain Description 1: Aching  Pain Intervention(s) 1: Medication (see MAR)  Activity Tolerance:   Good. Please refer to the flowsheet for vital signs taken during this treatment. After treatment:   [x]  Patient left in no apparent distress sitting up in chair  []  Patient left in no apparent distress in bed  [x]  Call bell left within reach  [x]  Nursing notified  []  Caregiver present  [x]  Chair alarm activated    COMMUNICATION/COLLABORATION:   The patients plan of care was discussed with: Certified Occupational Therapy Assistant, Registered Nurse and patient    Angelo Killian PT,JUANA.    Time Calculation: 24 mins

## 2019-01-10 NOTE — PROGRESS NOTES
..Bedside shift change report given to Shadia Bertrand RN (oncoming nurse) by Damaso Polanco RN (offgoing nurse). Report included the following information SBAR, Kardex, Procedure Summary, Intake/Output, MAR, Accordion, Procedure Verification and Quality Measures.

## 2019-01-10 NOTE — PROGRESS NOTES
Problem: Self Care Deficits Care Plan (Adult)  Goal: *Acute Goals and Plan of Care (Insert Text)  Occupational Therapy Goals  Initiated 1/8/2019    1. Patient will perform lower body dressing with modified independence using Reacher and Stocking Aid PRN within 7 days. 2.  Patient will perform toileting with modified independence using most appropriate DME within 7 days. 3.  Patient will grooming at modified independence within 7 days, standing at sink. 4.  Patient will don/doff back brace at modified independence within 7 days. 5.  Patient will verbalize/demonstrate 3/3 back precautions during ADL tasks without cues within 7 days. Occupational Therapy TREATMENT  Patient: Nj Murdock (77 y.o. male)  Date: 1/10/2019  Diagnosis: Spondylolisthesis of lumbar region [M43.16]  Pseudoclaudication syndrome [M48.062] <principal problem not specified>  Procedure(s) (LRB):  L4-S1 LAMINECTOMY, L4-L5 FUSION, INSTRUMENTATION, TLIF, BONE GRAFT (N/A) 3 Days Post-Op  Precautions: Fall, Back(LSO when OOB)  Chart, occupational therapy assessment, plan of care, and goals were reviewed. ASSESSMENT:  Pt seated in chair and wanting to don underwear and shorts. He was able to use reacher for both, impaired balance in standing and verbal cueing to keep one hand on walker while using other to bring pants up over hips. Recommend home health. Progression toward goals:  [x]       Improving appropriately and progressing toward goals  []       Improving slowly and progressing toward goals  []       Not making progress toward goals and plan of care will be adjusted     PLAN:  Patient continues to benefit from skilled intervention to address the above impairments. Continue treatment per established plan of care. Discharge Recommendations:  Home health  Further Equipment Recommendations for Discharge:  None     SUBJECTIVE:   Patient stated Hard to keep modesty around here.     OBJECTIVE DATA SUMMARY:   Cognitive/Behavioral Status:  Neurologic State: Alert  Orientation Level: Oriented X4  Cognition: Appropriate decision making             Functional Mobility and Transfers for ADLs:  Bed Mobility:       Transfers:  Sit to Stand: Contact guard assistance          Balance:  Standing: Impaired; With support    ADL Intervention:        Lower Body Dressing Assistance  Pants With Elastic Waist: Contact guard assistance  Leg Crossed Method Used: No  Position Performed: Seated in chair;Standing  Adaptive Equipment Used: Reacher       Pain:  Pain Scale 1: Numeric (0 - 10)  Pain Intensity 1: 4  Pain Location 1: Back  Pain Orientation 1: Posterior  Pain Description 1: Aching  Pain Intervention(s) 1: Medication (see MAR)  Activity Tolerance:   Fair  Please refer to the flowsheet for vital signs taken during this treatment.   After treatment:   [x] Patient left in no apparent distress sitting up in chair  [] Patient left in no apparent distress in bed  [x] Call bell left within reach  [] Nursing notified  [] Caregiver present  [x] Bed alarm activated    COMMUNICATION/COLLABORATION:   The patients plan of care was discussed with: Physical Therapist and Occupational Therapist    SCHUYLER Salmeron  Time Calculation: 13 mins

## 2019-01-10 NOTE — PROGRESS NOTES
Shift Summary    Bedside and Verbal shift change report given to Jovi Arteaga RN (oncoming nurse) by Omaira Sweet RN (offgoing nurse). Report included the following information SBAR, Kardex, MAR and Recent Results. Patient c/o increased pain to leg. Patient given 4mg of dilaudid as oppose to 2mg. No increased redness/swelling noted.

## 2019-01-10 NOTE — PROGRESS NOTES
T/C with pt's daughter, Rasheed Hawkins. Ms. Adalberto Godoy said the plan is for her father to stay with her post d/c in [place]. Address is [address] [place] [address]. Confirmed plan with pt who is agreeable, and [facility] has been notified.   Kay Houston LCSW

## 2019-01-10 NOTE — PROGRESS NOTES
ORTHOPAEDIC LUMBAR FUSION PROGRESS NOTE    NAME:     Marylu Chapa   :       1945   MRN:       [id number]   DATE:      1/10/2019    POD:    3 Days Post-Op  S/P:    Procedure(s):  L4-S1 LAMINECTOMY, L4-L5 FUSION, INSTRUMENTATION, TLIF, BONE GRAFT    SUBJECTIVE:    C/O back pain along surgical incision  No leg pain or numbness  Denies nausea/vomiting, headache, chest pain or shortness of breath  Pain control improved    Recent Labs     01/10/19  0256   HGB 9.8*      K 3.9      CO2 23   BUN 26*   CREA 1.15   *     Patient Vitals for the past 12 hrs:   BP Temp Pulse Resp SpO2   01/10/19 0332 156/74 98 °F (36.7 °C) 60 18 97 %   19 2327 111/53 97.9 °F (36.6 °C) 66 18 95 %       EXAM:  Dressings clean, dry and intact   Positive strength/ROM bilat lower ext.   Neuro intact to sensation  Calves, soft & nontender  BL LEs NVID      PLAN:  Continue prn PO pain medications  PT/OT, OOB w/ assist  Tolerating diet      Hannah Naidu [facility]  Orthopaedic Surgery  Physician Assistant to Dr. Guerita Jama

## 2019-01-11 VITALS
OXYGEN SATURATION: 94 % | TEMPERATURE: 99.4 F | WEIGHT: 266.54 LBS | HEIGHT: 66 IN | SYSTOLIC BLOOD PRESSURE: 143 MMHG | BODY MASS INDEX: 42.84 KG/M2 | RESPIRATION RATE: 16 BRPM | DIASTOLIC BLOOD PRESSURE: 69 MMHG | HEART RATE: 66 BPM

## 2019-01-11 LAB
ANION GAP SERPL CALC-SCNC: 10 MMOL/L (ref 5–15)
APPEARANCE UR: CLEAR
BACTERIA URNS QL MICRO: NEGATIVE /HPF
BILIRUB UR QL: NEGATIVE
BUN SERPL-MCNC: 18 MG/DL (ref 6–20)
BUN/CREAT SERPL: 16 (ref 12–20)
CALCIUM SERPL-MCNC: 9.6 MG/DL (ref 8.5–10.1)
CHLORIDE SERPL-SCNC: 102 MMOL/L (ref 97–108)
CO2 SERPL-SCNC: 25 MMOL/L (ref 21–32)
COLOR UR: NORMAL
CREAT SERPL-MCNC: 1.15 MG/DL (ref 0.7–1.3)
EPITH CASTS URNS QL MICRO: NORMAL /LPF
GLUCOSE BLD STRIP.AUTO-MCNC: 112 MG/DL (ref 65–100)
GLUCOSE BLD STRIP.AUTO-MCNC: 116 MG/DL (ref 65–100)
GLUCOSE BLD STRIP.AUTO-MCNC: 148 MG/DL (ref 65–100)
GLUCOSE SERPL-MCNC: 134 MG/DL (ref 65–100)
GLUCOSE UR STRIP.AUTO-MCNC: NEGATIVE MG/DL
HGB BLD-MCNC: 10.5 G/DL (ref 12.1–17)
HGB UR QL STRIP: NEGATIVE
HYALINE CASTS URNS QL MICRO: NORMAL /LPF (ref 0–5)
KETONES UR QL STRIP.AUTO: NEGATIVE MG/DL
LEUKOCYTE ESTERASE UR QL STRIP.AUTO: NEGATIVE
NITRITE UR QL STRIP.AUTO: NEGATIVE
PH UR STRIP: 5.5 [PH] (ref 5–8)
POTASSIUM SERPL-SCNC: 3.8 MMOL/L (ref 3.5–5.1)
PROT UR STRIP-MCNC: NEGATIVE MG/DL
RBC #/AREA URNS HPF: NORMAL /HPF (ref 0–5)
SERVICE CMNT-IMP: ABNORMAL
SODIUM SERPL-SCNC: 137 MMOL/L (ref 136–145)
SP GR UR REFRACTOMETRY: 1.01 (ref 1–1.03)
UA: UC IF INDICATED,UAUC: NORMAL
UROBILINOGEN UR QL STRIP.AUTO: 1 EU/DL (ref 0.2–1)
WBC URNS QL MICRO: NORMAL /HPF (ref 0–4)

## 2019-01-11 PROCEDURE — 81001 URINALYSIS AUTO W/SCOPE: CPT

## 2019-01-11 PROCEDURE — 94760 N-INVAS EAR/PLS OXIMETRY 1: CPT

## 2019-01-11 PROCEDURE — 36415 COLL VENOUS BLD VENIPUNCTURE: CPT

## 2019-01-11 PROCEDURE — 97535 SELF CARE MNGMENT TRAINING: CPT

## 2019-01-11 PROCEDURE — 74011250637 HC RX REV CODE- 250/637: Performed by: PHYSICIAN ASSISTANT

## 2019-01-11 PROCEDURE — 85018 HEMOGLOBIN: CPT

## 2019-01-11 PROCEDURE — 82962 GLUCOSE BLOOD TEST: CPT

## 2019-01-11 PROCEDURE — 97116 GAIT TRAINING THERAPY: CPT

## 2019-01-11 PROCEDURE — 80048 BASIC METABOLIC PNL TOTAL CA: CPT

## 2019-01-11 PROCEDURE — 74011636637 HC RX REV CODE- 636/637: Performed by: ORTHOPAEDIC SURGERY

## 2019-01-11 PROCEDURE — 97530 THERAPEUTIC ACTIVITIES: CPT

## 2019-01-11 PROCEDURE — 74011250637 HC RX REV CODE- 250/637: Performed by: ORTHOPAEDIC SURGERY

## 2019-01-11 PROCEDURE — 74011250637 HC RX REV CODE- 250/637: Performed by: NURSE PRACTITIONER

## 2019-01-11 RX ORDER — HYDROMORPHONE HYDROCHLORIDE 2 MG/1
2-4 TABLET ORAL
Qty: 60 TAB | Refills: 0 | Status: SHIPPED | OUTPATIENT
Start: 2019-01-11 | End: 2022-10-26

## 2019-01-11 RX ORDER — AMOXICILLIN 250 MG
1 CAPSULE ORAL 2 TIMES DAILY
Qty: 60 TAB | Refills: 0 | Status: SHIPPED | OUTPATIENT
Start: 2019-01-11 | End: 2019-02-10

## 2019-01-11 RX ORDER — NALOXONE HYDROCHLORIDE 4 MG/.1ML
SPRAY NASAL
Qty: 1 EACH | Refills: 0 | Status: SHIPPED | OUTPATIENT
Start: 2019-01-11 | End: 2022-10-26

## 2019-01-11 RX ADMIN — HYDROMORPHONE HYDROCHLORIDE 2 MG: 2 TABLET ORAL at 11:01

## 2019-01-11 RX ADMIN — VITAMIN D, TAB 1000IU (100/BT) 2000 UNITS: 25 TAB at 08:22

## 2019-01-11 RX ADMIN — STANDARDIZED SENNA CONCENTRATE AND DOCUSATE SODIUM 1 TABLET: 8.6; 5 TABLET, FILM COATED ORAL at 08:22

## 2019-01-11 RX ADMIN — GABAPENTIN 300 MG: 300 CAPSULE ORAL at 08:22

## 2019-01-11 RX ADMIN — INSULIN LISPRO 2 UNITS: 100 INJECTION, SOLUTION INTRAVENOUS; SUBCUTANEOUS at 08:21

## 2019-01-11 RX ADMIN — PIOGLITAZONE 15 MG: 15 TABLET ORAL at 08:22

## 2019-01-11 RX ADMIN — HYDROCHLOROTHIAZIDE 25 MG: 25 TABLET ORAL at 08:23

## 2019-01-11 RX ADMIN — HYDROMORPHONE HYDROCHLORIDE 2 MG: 2 TABLET ORAL at 06:53

## 2019-01-11 RX ADMIN — CARVEDILOL 25 MG: 12.5 TABLET, FILM COATED ORAL at 08:22

## 2019-01-11 RX ADMIN — METFORMIN HYDROCHLORIDE 500 MG: 500 TABLET ORAL at 08:22

## 2019-01-11 RX ADMIN — AMLODIPINE BESYLATE 10 MG: 5 TABLET ORAL at 08:23

## 2019-01-11 RX ADMIN — FAMOTIDINE 20 MG: 20 TABLET ORAL at 08:22

## 2019-01-11 RX ADMIN — HYDROMORPHONE HYDROCHLORIDE 2 MG: 2 TABLET ORAL at 15:17

## 2019-01-11 RX ADMIN — LISINOPRIL 40 MG: 20 TABLET ORAL at 08:23

## 2019-01-11 NOTE — PROGRESS NOTES
Problem: Falls - Risk of  Goal: *Absence of Falls  Document Teresa Fall Risk and appropriate interventions in the flowsheet. Outcome: Progressing Towards Goal  Fall Risk Interventions:  Mobility Interventions: Bed/chair exit alarm, OT consult for ADLs, PT Consult for mobility concerns, PT Consult for assist device competence, Utilize walker, cane, or other assistive device, Utilize gait belt for transfers/ambulation         Medication Interventions: Bed/chair exit alarm, Patient to call before getting OOB, Utilize gait belt for transfers/ambulation    Elimination Interventions: Bed/chair exit alarm, Call light in reach, Patient to call for help with toileting needs, Urinal in reach    History of Falls Interventions: Bed/chair exit alarm, Utilize gait belt for transfer/ambulation        Problem: Pressure Injury - Risk of  Goal: *Prevention of pressure injury  Document Reji Scale and appropriate interventions in the flowsheet.   Outcome: Progressing Towards Goal  Pressure Injury Interventions:  Sensory Interventions: Assess changes in LOC, Discuss PT/OT consult with provider, Keep linens dry and wrinkle-free, Minimize linen layers, Pressure redistribution bed/mattress (bed type)    Moisture Interventions: Absorbent underpads, Check for incontinence Q2 hours and as needed, Minimize layers, Offer toileting Q_hr    Activity Interventions: Chair cushion, Increase time out of bed, Trapeze to reposition, Pressure redistribution bed/mattress(bed type)    Mobility Interventions: Assess need for specialty bed, Float heels, Pressure redistribution bed/mattress (bed type), Suspension boots, Trapeze to reposition    Nutrition Interventions: Document food/fluid/supplement intake    Friction and Shear Interventions: HOB 30 degrees or less, Minimize layers

## 2019-01-11 NOTE — PROGRESS NOTES
I have reviewed discharge instructions with the patient. The patient verbalized understanding. Prescription for Dilaudid given to patient. Opportunity for questions provided.

## 2019-01-11 NOTE — PROGRESS NOTES
1/11/2019 11:17 AM Ortho PA attempted to call pt's daughter, no answer. CM called pt's daughter Michael Owens at [phone], no answer and vm is full. 1/11/2019 10:41 AM [facility] was sent pt's discharge clinicals and notified of pt's discharge home today via [employer]. CM called pt's daughter, Ayse([phone]) and notified of pt's discharge today. Pt's daughter expressed her concerns with pt's discharge due to his out of character behaviors(calling her in the middle of the night, not being oriented to time of the day, and [place] text messages\"). Pt's daughter also reported pt is a daily drinker. CM relayed these concerns to Ortho NP who will relay to Ortho PA to contact pt's daughter. CM will follow.  TANA Carlton

## 2019-01-11 NOTE — PROGRESS NOTES
Bedside, Verbal and Written shift change report given to Gia Diallo  (oncoming nurse) by Fanta Dean RN  (offgoing nurse).  Report included the following information SBAR, Kardex, ED Summary, OR Summary, Procedure Summary, Intake/Output, MAR, Accordion, Recent Results, Med Rec Status and Pre Procedure Checklist.

## 2019-01-11 NOTE — PROGRESS NOTES
ORTHOPAEDIC LUMBAR FUSION PROGRESS NOTE    NAME:     Donato Ladd   :       1945   MRN:       [id number]   DATE:      2019    POD:    4 Days Post-Op  S/P:    Procedure(s):  L4-S1 LAMINECTOMY, L4-L5 FUSION, INSTRUMENTATION, TLIF, BONE GRAFT    SUBJECTIVE:    C/O back pain along surgical incision, has some bilateral buttock pain  No leg pain or numbness  Denies nausea/vomiting, headache, chest pain or shortness of breath  Pain controlled    Recent Labs     19  0607   HGB 10.5*      K 3.8      CO2 25   BUN 18   CREA 1.15   *     Patient Vitals for the past 12 hrs:   BP Temp Pulse Resp SpO2   19 0729 156/79 98.5 °F (36.9 °C) 66 20 97 %   19 0315 167/78 98.3 °F (36.8 °C) 64 16 97 %   01/10/19 2323 142/69 98.5 °F (36.9 °C) 68 16 95 %       EXAM:  Dressings clean, dry and intact   Positive strength/ROM bilat lower ext.   Neuro intact to sensation  Calves, soft & nontender  BL LEs NVID      PLAN:  Declines a dose of IV decadron  Continue prn PO pain medications  PT/OT, OOB w/ assist  Tolerating diet      [facility], [address] [place] [address]  Orthopaedic Surgery  Physician Assistant to Dr. Margy Rodriguez

## 2019-01-11 NOTE — PROGRESS NOTES
physical Therapy TREATMENT/DISCHARGE  Patient: Robert Villafana (77 y.o. male)  Date: 1/11/2019  Diagnosis: Spondylolisthesis of lumbar region [M43.16]  Pseudoclaudication syndrome [M48.062] <principal problem not specified>  Procedure(s) (LRB):  L4-S1 LAMINECTOMY, L4-L5 FUSION, INSTRUMENTATION, TLIF, BONE GRAFT (N/A) 4 Days Post-Op  Precautions: Fall, Back(LSO when OOB)  Chart, physical therapy assessment, plan of care and goals were reviewed. ASSESSMENT:  Based on the objective data described above, the patient presents with modified independent with ambulation using a rolling walker as well as up and down stairs and modified independent with all functional mobility. Reviewed back precautions and donning of back brace verbalized understanding. Reviewed all safety precaution and home exercise program with the patient, verbalized understanding, clear to go home per Physical Therapy perspective. Skilled physical therapy is not indicated at this time. Progression toward goals:  [x]      Improving appropriately and progressing toward goals  []      Improving slowly and progressing toward goals  []      Not making progress toward goals and plan of care will be adjusted     PLAN:  Patient will be discharged from acute skilled physical therapy at this time. Rationale for discharge:  [x] Goals Achieved  [] [address]  [] Patient not participating in therapy  [] Other:  Discharge Recommendations:  Home Health  Further Equipment Recommendations for Discharge:  Already got rolling walker     SUBJECTIVE:   Patient stated Brooklyn Lull to go home.     OBJECTIVE DATA SUMMARY:   Critical Behavior:  Neurologic State: Alert  Orientation Level: Oriented X4  Cognition: Appropriate decision making  Safety/Judgement: Good awareness of safety precautions  Functional Mobility Training:  Bed Mobility:  Rolling: Modified independent  Supine to Sit: Modified independent  Sit to Supine: Modified independent  Scooting: Modified independent        Transfers:  Sit to Stand: Modified independent  Stand to Sit: Modified independent  Stand Pivot Transfers: Modified independent     Bed to Chair: Modified independent                    Balance:  Sitting: Intact  Standing: Intact; With support  Standing - Static: Good  Standing - Dynamic : Good  Ambulation/Gait Training:  Distance (ft): 200 Feet (ft)  Assistive Device: Walker, rolling;Gait belt;Brace/Splint  Ambulation - Level of Assistance: Modified independent     Gait Description (WDL): Exceptions to WDL  Gait Abnormalities: Antalgic        Base of Support: Widened     Speed/Brenda: Pace decreased (<100 feet/min)                       Stairs:  Number of Stairs Trained: 4  Stairs - Level of Assistance: Supervision   Rail Use: Both    Therapeutic Exercises:    Instructed patient to continue active range of motion exercise on both legs while up on chair or on bed. Pain:  Pain Scale 1: Numeric (0 - 10)  Pain Intensity 1: 3  Pain Location 1: Back  Pain Orientation 1: Anterior  Pain Description 1: Aching  Pain Intervention(s) 1: Medication (see MAR)  Activity Tolerance:   Good. Please refer to the flowsheet for vital signs taken during this treatment. After treatment:   [x] Patient left in no apparent distress sitting up in chair  [] Patient left in no apparent distress in bed  [x] Call bell left within reach  [x] Nursing notified  [] Caregiver present  [] Bed alarm activated    COMMUNICATION/COLLABORATION:   The patients plan of care was discussed with: Registered Nurse,  and patient    Jackie Cowan PT,WCC.    Time Calculation: 24 mins

## 2019-01-11 NOTE — PROGRESS NOTES
Problem: Self Care Deficits Care Plan (Adult)  Goal: *Acute Goals and Plan of Care (Insert Text)  Occupational Therapy Goals  Initiated 1/8/2019    1. Patient will perform lower body dressing with modified independence using Reacher and Stocking Aid PRN within 7 days. 2.  Patient will perform toileting with modified independence using most appropriate DME within 7 days. 3.  Patient will grooming at modified independence within 7 days, standing at sink. 4.  Patient will don/doff back brace at modified independence within 7 days. 5.  Patient will verbalize/demonstrate 3/3 back precautions during ADL tasks without cues within 7 days. Occupational Therapy TREATMENT  Patient: Loann Monday (77 y.o. male)  Date: 1/11/2019  Diagnosis: Spondylolisthesis of lumbar region [M43.16]  Pseudoclaudication syndrome [M48.062] <principal problem not specified>  Procedure(s) (LRB):  L4-S1 LAMINECTOMY, L4-L5 FUSION, INSTRUMENTATION, TLIF, BONE GRAFT (N/A) 4 Days Post-Op  Precautions: Fall, Back(LSO when OOB)  Chart, occupational therapy assessment, plan of care, and goals were reviewed. ASSESSMENT:  Pt present seated in chair agreeable to OT. Pt sit to stand with contact guard. Ambulation to the bathroom and stands at the sink to brush his teeth with mod I. Pt with improved mobility today for ADL's and is cleared from an OT standpoint to return home with family and home health. Progression toward goals:  [x]       Improving appropriately and progressing toward goals  []       Improving slowly and progressing toward goals  []       Not making progress toward goals and plan of care will be adjusted     PLAN:  Patient continues to benefit from skilled intervention to address the above impairments. Continue treatment per established plan of care. Discharge Recommendations:  Home health  Further Equipment Recommendations for Discharge:  None     SUBJECTIVE:   Patient stated I think I am good to go.     OBJECTIVE DATA SUMMARY:   Cognitive/Behavioral Status:  Neurologic State: Alert; Appropriate for age  Orientation Level: Oriented X4  Cognition: Appropriate decision making             Functional Mobility and Transfers for ADLs:  Bed Mobility:  Rolling: Modified independent  Supine to Sit: Modified independent  Sit to Supine: Modified independent  Scooting: Modified independent    Transfers:  Sit to Stand: Modified independent     Bed to Chair: Modified independent    Balance:  Sitting: Intact  Standing: Intact; With support  Standing - Static: Good  Standing - Dynamic : Good    ADL Intervention:       Grooming  Brushing Teeth: Modified independent(standing at sink)       Pain:  Pain Scale 1: Numeric (0 - 10)  Pain Intensity 1: 3  Pain Location 1: Back  Pain Orientation 1: Anterior  Pain Description 1: Aching  Pain Intervention(s) 1: Medication (see MAR)  Activity Tolerance:   No signs/symptoms of distress or discomfort during OT  Please refer to the flowsheet for vital signs taken during this treatment.   After treatment:   [x] Patient left in no apparent distress sitting up in chair  [] Patient left in no apparent distress in bed  [x] Call bell left within reach  [] Nursing notified  [] Caregiver present  [] Bed alarm activated    COMMUNICATION/COLLABORATION:   The patients plan of care was discussed with: Occupational Therapist    SCHUYLER Aiken  Time Calculation: 15 mins

## 2019-01-11 NOTE — DISCHARGE INSTRUCTIONS
Lumbar Spinal Surgery Discharge Instructions   Dr. Benavidez Handler  [phone]    Activity:    [facility] You are going home a well person, be as active as possible. Your only exercise should be walking. Start with short frequent walks and increase your walking distance each day. Start with walking twice a day for 5 minutes and increase your distance each day 2-3 minutes until you reach 25 minutes twice a day. Limit the amount of time you sit to 20-30 minute intervals. Sitting for prolonged periods of time will be uncomfortable for you following your surgery.  No bending, lifting (of 5lbs or more), twisting, or straining.  Do not drive until your doctor states you may do so. However, you may ride in a car for short distances.  If you are required to wear a brace, you should wear it at all times when you are out of bed. You may remove it when sleeping unless your physician advises you against it.  When you are in the bed, you may lay on your back or on either side. Do not lie on your stomach.  You may resume sexual relations 3-4 weeks after surgery depending on how you are feeling.  Continue to use your incentive spirometer for deep breathing exercises. Driving:   You may not drive or return to work until instructed by your physician. This will be at least 6 weeks. However, you may ride in the car for short periods of time. Brace:   If you have a back brace, you should wear your brace at all times when you are out of bed. Do not wear the brace while in bed or showering.  Remember to always wear a cotton t-shirt underneath your brace.  Do not bend or twist when your brace is off. Diet:   You may resume your regular home diet as tolerated. If your throat is sore, you should eat soft foods for the first couple of days.  Be sure to drink plenty of fluids; it is important to keep yourself hydrated.     Avoid alcoholic beverages and ABSOLUTELY NO tobacco products. Tobacco products will interfere with your healing. If you continue to use tobacco, you may end up needing another surgery in the future. Dressing: You have on a Prineo dressing. This is a waterproof bacteriostatic dressing that  requires no post-operative care. Please do not peel the dressing off, or apply any  oil based products, as they may expedite the deterioration of the mesh. The  dressing will slowly chip off on its own.  Do not rub or apply lotion or ointments to incision site. Shower:   You may shower 5 days after your surgery.  You may remove your brace during showers.  NO not use tub baths, swimming pools or Jacuzzis. Medications:   Check with your physician before taking any anti-inflammatory medications. (Advil, Aleve, Ibuprofen, Aspirin)   Take your pain medication as directed   Do NOT take additional Tylenol if your prescribed pain medication has acetaminophen in it (Endocet/Percocet, Lortab, Norco)   It is important to have regular bowel movements. Pain medications can be constipating. Stool softeners, warm prune juice, increasing your water intake, and increasing fiber in your diet can help in preventing constipation.  Do NOT take laxatives if at all possible except in severe situations. It can result in a vicious cycle of constipation and diarrhea. Follow-Up    Please call ASAP to schedule your 1st post-operative appointment. This should be approximately 3 weeks from your surgery date. Notify your physician in you develop any of the following conditions:   Fever above 101 degrees for 24 hours.  Nausea or vomiting.  Severe headache.  Inability to urinate   Loss of bowel or bladder function (sudden onset of incontinence)   Changes in sensation in your extremities (numbness, tingling, loss of color).  Severe pain or pain not relieved by medications.  Redness, swelling, or drainage from your incision.    Persistent pain in the chest.    Pain in the calf of either leg.  Increased weakness (if this is greater than before your surgery). If you have any questions about your dressing contact your Orthopaedic Surgeons office. OFFICE OF DR. Ann Heller   [phone]  OUR NEW ADDRESS IS [address] [place] [address], [facility] [address] [facility]     ** IMPORTANT: UNDER YOUR HONEYCOMB DRESSING, YOU HAVE A PRINEO ADHESIVE MESH DIRECTLY OVER YOUR INCISION. THIS MESH WAS STERILELY GLUED TO YOUR SKIN DURING SURGERY. DO NOT REMOVE THIS. NO ONE ELSE SHOULD REMOVE IT EITHER. IT WILL COME OFF ON ITS OWN OVER TIME    YOUR HONEYCOMB DRESSING NEEDS TO BE REMOVED PRIOR TO SHOWERING. THEN THE PRINEO MESH CAN BE LEFT OPEN TO AIR    * WEAR YOUR BRACE AS ADVISED    * NO DRIVING UNTIL YOU ARE CLEARED TO DO SO BY YOUR SURGEON    * LIMIT LIFTING, BENDING AND TWISTING.  NO LIFTING MORE THAN 5 LBS    * MAKE SURE YOU ARE GETTING GOOD NUTRITION (Lean Protein, Vitamin D AND Calcium)    * DO NOT TAKE ANY NSAIDs FOR THE FIRST 3 MONTHS AFTER SURGERY (such as Advil/Ibuprofen/Motrin, Aleve/Naproxen/Naprosyn, Diclofenac, Celebrex, Meloxicam, Indomethacin, Goody's powder, BC powder etc.)    * NO NICOTINE PRODUCTS    * FULLY READ YOUR DISCHARGE INSTRUCTIONS

## 2019-01-20 NOTE — DISCHARGE SUMMARY
DISCHARGE SUMMARY     Patient: Nj Murdock                             Medical Record Number: [id number]                : 1945  Age: 69 y.o. Admit Date: 2019  Discharge Date: 2019  Admission Diagnosis: Spondylolisthesis of lumbar region [M43.16]  Pseudoclaudication syndrome [M48.062]  Discharge Diagnosis: Spondylolisthesis of lumbar region [M43.16]  Procedures: Procedure(s):  L4-S1 LAMINECTOMY, L4-L5 FUSION, INSTRUMENTATION, TLIF, BONE GRAFT  Surgeon: Alonso Courtney MD  Co-surgeon:   Assistants:   Anesthesia: General  Complications: None     History of Present Illness:  Nj Murdock is a 69 y.o. male with a history of intractable low back and radiculopathy. Despite conservative management and after clinical and radiographic evaluation, it was determined that he suffered from lumbar stenosis  and lumbar spondylolisthesis and would benefit from Procedure(s):  L4-S1 LAMINECTOMY, L4-L5 FUSION, INSTRUMENTATION, TLIF, BONE GRAFT, which he consented to undergo after a discussion of the risks, benefits, alternatives, rehab concerns, and potential complications of surgery. Hospital Course:  Leonard Rivera tolerated the procedure well. He was transferred  to the recovery room in stable condition. After a brief stay, the patient was then transferred to the Orthopedic floor. On postoperative day #1, the dressing was clean and dry and he was neurovascularly intact. The patient was afebrile and vital signs were stable. Calves were soft and non-tender bilaterally. Nj Murdock made excellent progress with physical therapy and was discharged to Home with Home Health in stable condition on postoperative day 4. He was provided with routine postoperative instructions and advised to follow up in my office in 3 weeks following discharge from the hospital.  He was given prescriptions for medication to control post-operative symptoms.     Discharge Medications:  Discharge Medication List as of 1/11/2019 11:35 AM      START taking these medications    Details   HYDROmorphone (DILAUDID) 2 mg tablet Take 1-2 Tabs by mouth every four (4) hours as needed. Max Daily Amount: 24 mg., Print, Disp-60 Tab, R-0      senna-docusate (PERICOLACE) 8.6-50 mg per tablet Take 1 Tab by mouth two (2) times a day for 30 days. , Print, Disp-60 Tab, R-0      naloxone (NARCAN) 4 mg/actuation nasal spray Use 1 spray intranasally, then discard. Repeat with new spray every 2 min as needed for opioid overdose symptoms, alternating nostrils. , Print, Disp-1 Each, R-0         CONTINUE these medications which have NOT CHANGED    Details   carvedilol (COREG) 25 mg tablet Take 25 mg by mouth two (2) times daily (with meals). , Historical Med      MV,Ca,Min/Iron/FA/Lyc/Lut/Phyt ([employer] PO) Take 1 Tab by mouth daily (with breakfast). , Historical Med      amLODIPine-benazepril (LOTREL) 10-40 mg per capsule Take 1 Cap by mouth daily. , Historical Med      pioglitazone-metFORMIN (ACTOPLUS MET)  mg per tablet Take 1 Tab by mouth daily (with breakfast). , Historical Med      gabapentin (NEURONTIN) 600 mg tablet Take 600 mg by mouth nightly., Historical Med      cloNIDine (CATAPRES) 0.1 mg/24 hr ptwk 1 Patch by TransDERmal route every seven (7) days. Applies every Sunday, Historical Med      fenofibrate (LOFIBRA) 160 mg tablet Take 160 mg by mouth nightly., Historical Med      hydroCHLOROthiazide (HYDRODIURIL) 25 mg tablet Take 25 mg by mouth daily (with breakfast). , Historical Med      atorvastatin (LIPITOR) 20 mg tablet Take 20 mg by mouth nightly., Historical Med      garlic cap Take 1 Cap by mouth daily (with breakfast). , Historical Med      cholecalciferol, vitamin D3, (VITAMIN D3) 2,000 unit tab Take 1 Tab by mouth daily (with breakfast). , Historical Med         STOP taking these medications       aspirin (ASPIRIN) 325 mg tablet Comments:   Reason for Stopping:         omega 3-dha-epa-fish oil (FISH OIL) 100-160-1,000 mg cap Comments:   Reason for Stopping:               Robert Garcia, [address]  1/11/2019  Orthopaedic Surgery  Physician Assistant to Dr. Luis Mendez

## 2019-11-13 ENCOUNTER — APPOINTMENT (RX ONLY)
Dept: URBAN - METROPOLITAN AREA CLINIC 33 | Facility: CLINIC | Age: 74
Setting detail: DERMATOLOGY
End: 2019-11-13

## 2019-11-13 DIAGNOSIS — Z12.83 ENCOUNTER FOR SCREENING FOR MALIGNANT NEOPLASM OF SKIN: ICD-10-CM

## 2019-11-13 DIAGNOSIS — Z85.828 PERSONAL HISTORY OF OTHER MALIGNANT NEOPLASM OF SKIN: ICD-10-CM

## 2019-11-13 DIAGNOSIS — L81.4 OTHER MELANIN HYPERPIGMENTATION: ICD-10-CM

## 2019-11-13 DIAGNOSIS — D18.0 HEMANGIOMA: ICD-10-CM

## 2019-11-13 DIAGNOSIS — L82.1 OTHER SEBORRHEIC KERATOSIS: ICD-10-CM

## 2019-11-13 DIAGNOSIS — L57.0 ACTINIC KERATOSIS: ICD-10-CM

## 2019-11-13 DIAGNOSIS — L82.0 INFLAMED SEBORRHEIC KERATOSIS: ICD-10-CM

## 2019-11-13 PROBLEM — E78.5 HYPERLIPIDEMIA, UNSPECIFIED: Status: ACTIVE | Noted: 2019-11-13

## 2019-11-13 PROBLEM — M12.9 ARTHROPATHY, UNSPECIFIED: Status: ACTIVE | Noted: 2019-11-13

## 2019-11-13 PROBLEM — E13.9 OTHER SPECIFIED DIABETES MELLITUS WITHOUT COMPLICATIONS: Status: ACTIVE | Noted: 2019-11-13

## 2019-11-13 PROBLEM — I10 ESSENTIAL (PRIMARY) HYPERTENSION: Status: ACTIVE | Noted: 2019-11-13

## 2019-11-13 PROBLEM — D18.01 HEMANGIOMA OF SKIN AND SUBCUTANEOUS TISSUE: Status: ACTIVE | Noted: 2019-11-13

## 2019-11-13 PROCEDURE — ? LIQUID NITROGEN

## 2019-11-13 PROCEDURE — ? FULL BODY SKIN EXAM

## 2019-11-13 PROCEDURE — 99213 OFFICE O/P EST LOW 20 MIN: CPT | Mod: 25

## 2019-11-13 PROCEDURE — 17004 DESTROY PREMAL LESIONS 15/>: CPT

## 2019-11-13 PROCEDURE — ? SUNSCREEN RECOMMENDATIONS

## 2019-11-13 PROCEDURE — 17110 DESTRUCTION B9 LES UP TO 14: CPT | Mod: 59

## 2019-11-13 PROCEDURE — ? COUNSELING

## 2019-11-13 ASSESSMENT — LOCATION DETAILED DESCRIPTION DERM
LOCATION DETAILED: RIGHT PROXIMAL CALF
LOCATION DETAILED: RIGHT CLAVICULAR SKIN
LOCATION DETAILED: LEFT LATERAL UPPER BACK
LOCATION DETAILED: NASAL DORSUM
LOCATION DETAILED: INFERIOR THORACIC SPINE
LOCATION DETAILED: RIGHT CLAVICULAR NECK
LOCATION DETAILED: LEFT CLAVICULAR NECK
LOCATION DETAILED: RIGHT FOREHEAD
LOCATION DETAILED: LEFT CLAVICULAR SKIN
LOCATION DETAILED: MID-FRONTAL SCALP
LOCATION DETAILED: LEFT ANTERIOR SHOULDER
LOCATION DETAILED: LEFT SUPERIOR FOREHEAD
LOCATION DETAILED: LEFT DISTAL DORSAL FOREARM
LOCATION DETAILED: LEFT SUPERIOR UPPER BACK
LOCATION DETAILED: RIGHT SUPERIOR PARIETAL SCALP
LOCATION DETAILED: LEFT SUPERIOR PARIETAL SCALP
LOCATION DETAILED: RIGHT SUPERIOR MEDIAL FOREHEAD
LOCATION DETAILED: RIGHT CENTRAL PARIETAL SCALP
LOCATION DETAILED: LEFT DISTAL POSTERIOR UPPER ARM
LOCATION DETAILED: LEFT SUPERIOR LATERAL UPPER BACK
LOCATION DETAILED: RIGHT SUPERIOR LATERAL UPPER BACK
LOCATION DETAILED: RIGHT MID-UPPER BACK

## 2019-11-13 ASSESSMENT — LOCATION SIMPLE DESCRIPTION DERM
LOCATION SIMPLE: LEFT UPPER BACK
LOCATION SIMPLE: NOSE
LOCATION SIMPLE: SCALP
LOCATION SIMPLE: RIGHT UPPER BACK
LOCATION SIMPLE: LEFT FOREHEAD
LOCATION SIMPLE: RIGHT ANTERIOR NECK
LOCATION SIMPLE: LEFT ANTERIOR NECK
LOCATION SIMPLE: LEFT CLAVICULAR SKIN
LOCATION SIMPLE: LEFT FOREARM
LOCATION SIMPLE: RIGHT FOREHEAD
LOCATION SIMPLE: RIGHT CALF
LOCATION SIMPLE: ANTERIOR SCALP
LOCATION SIMPLE: LEFT UPPER ARM
LOCATION SIMPLE: UPPER BACK
LOCATION SIMPLE: LEFT SHOULDER
LOCATION SIMPLE: RIGHT CLAVICULAR SKIN

## 2019-11-13 ASSESSMENT — LOCATION ZONE DERM
LOCATION ZONE: NOSE
LOCATION ZONE: SCALP
LOCATION ZONE: LEG
LOCATION ZONE: ARM
LOCATION ZONE: TRUNK
LOCATION ZONE: NECK
LOCATION ZONE: FACE

## 2019-11-13 NOTE — PROCEDURE: LIQUID NITROGEN
Duration Of Freeze Thaw-Cycle (Seconds): 0
Post-Care Instructions: I reviewed with the patient in detail post-care instructions. Patient is to wear sunprotection, and avoid picking at any of the treated lesions. Pt may apply Vaseline to crusted or scabbing areas.
Detail Level: Zone
Render Post-Care Instructions In Note?: no
Consent: The patient's consent was obtained including but not limited to risks of crusting, scabbing, blistering, scarring, darker or lighter pigmentary change, recurrence, incomplete removal and infection.
Medical Necessity Information: It is in your best interest to select a reason for this procedure from the list below. All of these items fulfill various CMS LCD requirements except the new and changing color options.
Detail Level: Detailed
Medical Necessity Clause: This procedure was medically necessary because the lesions that were treated were:

## 2019-12-20 NOTE — PROGRESS NOTES
Problem: Mobility Impaired (Adult and Pediatric)  Goal: *Acute Goals and Plan of Care (Insert Text)  Physical Therapy Goals  Initiated 1/8/2019    1. Patient will move from supine to sit and sit to supine  in bed with modified independence within 4 days. 2. Patient will perform sit to stand with modified independence within 4 days. 3. Patient will ambulate with modified independence for 100 feet with the least restrictive device within 4 days. 4. Patient will ascend/descend 4 stairs with 1 handrail(s) with minimal assistance/contact guard assist within 4 days. 5. Patient will verbalize and demonstrate understanding of spinal precautions (No bending, lifting greater than 5 lbs, or twisting; log-roll technique; frequent repositioning as instructed) within 4 days. physical Therapy TREATMENT  Patient: Natalie Rivera (77 y.o. male)  Date: 1/9/2019  Diagnosis: Spondylolisthesis of lumbar region [M43.16]  Pseudoclaudication syndrome [M48.062] <principal problem not specified>  Procedure(s) (LRB):  L4-S1 LAMINECTOMY, L4-L5 FUSION, INSTRUMENTATION, TLIF, BONE GRAFT (N/A) 2 Days Post-Op  Precautions: Fall, Back(LSO when OOB)  Chart, physical therapy assessment, plan of care and goals were reviewed. ASSESSMENT:  Based on the objective data described below, patient participated well with treatment today. Came back after nurse gave him his pain medication, pain much better now. Sit to stand CGA, ambulate with rolling walker CGA, assisted to and from the bathroom. ambulate out on the 4th floor  Hallway CGA no loss of balance, reviewed back precautions and donning of back brace verbalized understanding. Assisted back to the room and OOB to chair as tolerated. Notified nurse who agreed to monitor patient.     Progression toward goals:  [x]      Improving appropriately and progressing toward goals  []      Improving slowly and progressing toward goals  []      Not making progress toward goals and plan of care will be adjusted     PLAN:  Patient continues to benefit from skilled intervention to address the above impairments. Continue treatment per established plan of care. Discharge Recommendations:  Please refer to MD's recommendation for disposition. Further Equipment Recommendations for Discharge:  Rolling walker in the room already     SUBJECTIVE:   Patient stated Shawna Peon my pain little bit better now.    The patient stated 3/3 back precautions. Reviewed all 3 with patient. OBJECTIVE DATA SUMMARY:   Critical Behavior:  Neurologic State: Alert  Orientation Level: Oriented X4  Cognition: Appropriate decision making  Safety/Judgement: Good awareness of safety precautions  Functional Mobility Training:  Bed Mobility:  Log Rolling: (already up on the chair by OT)                 Brace donned with  minimal assistance/contact guard assist   Transfers:  Sit to Stand: Contact guard assistance  Stand to Sit: Contact guard assistance  Stand Pivot Transfers: Contact guard assistance     Bed to Chair: Contact guard assistance                    Balance:  Sitting: Intact; High guard  Standing: Impaired; With support  Standing - Static: Fair  Standing - Dynamic : Fair  Ambulation/Gait Training:  Distance (ft): 80 Feet (ft)  Assistive Device: Walker, rolling;Gait belt;Brace/Splint  Ambulation - Level of Assistance: Minimal assistance; Additional time     Gait Description (WDL): Exceptions to WDL  Gait Abnormalities: Antalgic; Path deviations; Step to gait        Base of Support: Widened     Speed/Brenda: Pace decreased (<100 feet/min)           Therapeutic Exercises:    Instructed patient to continue active range of motion exercise on both legs while up on chair or on bed. Pain:  Pain Scale 1: Numeric (0 - 10)  Pain Intensity 1: 3  Pain Location 1: Back  Pain Orientation 1: Posterior  Pain Description 1: Aching;Constant  Pain Intervention(s) 1: Medication (see MAR)  Activity Tolerance:   Good.   Please refer to the flowsheet for vital signs taken during this treatment. After treatment:   [x]  Patient left in no apparent distress sitting up in chair  []  Patient left in no apparent distress in bed  [x]  Call bell left within reach  [x]  Nursing notified  []  Caregiver present  [x]  Chair alarm activated    COMMUNICATION/COLLABORATION:   The patients plan of care was discussed with: Certified Occupational Therapy Assistant, Registered Nurse and patient    Abraham Wagner PT ,[facility].    Time Calculation: 26 mins pt presents to the ED c/o palpitations started suddenly this morning at 3am. pt. c/o HA started at this time. pt states he had nasal congestion for a couple of days. pt denies SOB, denies dizziness, denies cough, denies body aches, denies chills, denies urinary symptoms. pt states " took Tylenol prior to arrival."

## 2020-02-03 ENCOUNTER — APPOINTMENT (RX ONLY)
Dept: URBAN - METROPOLITAN AREA CLINIC 33 | Facility: CLINIC | Age: 75
Setting detail: DERMATOLOGY
End: 2020-02-03

## 2020-02-03 DIAGNOSIS — L82.0 INFLAMED SEBORRHEIC KERATOSIS: ICD-10-CM

## 2020-02-03 DIAGNOSIS — L57.0 ACTINIC KERATOSIS: ICD-10-CM

## 2020-02-03 PROCEDURE — 17003 DESTRUCT PREMALG LES 2-14: CPT | Mod: 59

## 2020-02-03 PROCEDURE — 17110 DESTRUCTION B9 LES UP TO 14: CPT

## 2020-02-03 PROCEDURE — ? LIQUID NITROGEN

## 2020-02-03 PROCEDURE — 17000 DESTRUCT PREMALG LESION: CPT | Mod: 59

## 2020-02-03 ASSESSMENT — LOCATION DETAILED DESCRIPTION DERM
LOCATION DETAILED: RIGHT SUPERIOR PARIETAL SCALP
LOCATION DETAILED: LEFT MID-UPPER BACK
LOCATION DETAILED: LEFT MEDIAL FRONTAL SCALP
LOCATION DETAILED: SUPERIOR LUMBAR SPINE
LOCATION DETAILED: RIGHT MEDIAL FRONTAL SCALP

## 2020-02-03 ASSESSMENT — LOCATION SIMPLE DESCRIPTION DERM
LOCATION SIMPLE: SCALP
LOCATION SIMPLE: LEFT UPPER BACK
LOCATION SIMPLE: LEFT SCALP
LOCATION SIMPLE: RIGHT SCALP
LOCATION SIMPLE: LOWER BACK

## 2020-02-03 ASSESSMENT — LOCATION ZONE DERM
LOCATION ZONE: TRUNK
LOCATION ZONE: SCALP

## 2020-02-03 NOTE — PROCEDURE: LIQUID NITROGEN
Render Post-Care Instructions In Note?: no
Detail Level: Zone
Consent: The patient's consent was obtained including but not limited to risks of crusting, scabbing, blistering, scarring, darker or lighter pigmentary change, recurrence, incomplete removal and infection.
Duration Of Freeze Thaw-Cycle (Seconds): 0
Post-Care Instructions: I reviewed with the patient in detail post-care instructions. Patient is to wear sunprotection, and avoid picking at any of the treated lesions. Pt may apply Vaseline to crusted or scabbing areas.
Medical Necessity Clause: This procedure was medically necessary because the lesions that were treated were:
Detail Level: Detailed
Medical Necessity Information: It is in your best interest to select a reason for this procedure from the list below. All of these items fulfill various CMS LCD requirements except the new and changing color options.

## 2021-03-17 ENCOUNTER — TRANSCRIBE ORDER (OUTPATIENT)
Dept: SCHEDULING | Age: 76
End: 2021-03-17

## 2021-03-17 DIAGNOSIS — M48.02 STENOSIS OF CERVICAL SPINE WITH MYELOPATHY (HCC): Primary | ICD-10-CM

## 2021-03-17 DIAGNOSIS — G99.2 STENOSIS OF CERVICAL SPINE WITH MYELOPATHY (HCC): Primary | ICD-10-CM

## 2021-03-31 ENCOUNTER — HOSPITAL ENCOUNTER (OUTPATIENT)
Dept: MRI IMAGING | Age: 76
Discharge: HOME | End: 2021-03-31
Attending: SPECIALIST
Payer: MEDICARE

## 2021-03-31 DIAGNOSIS — G99.2 STENOSIS OF CERVICAL SPINE WITH MYELOPATHY (HCC): ICD-10-CM

## 2021-03-31 DIAGNOSIS — M48.02 STENOSIS OF CERVICAL SPINE WITH MYELOPATHY (HCC): ICD-10-CM

## 2021-03-31 PROCEDURE — 72141 MRI NECK SPINE W/O DYE: CPT

## 2021-07-28 ENCOUNTER — HOSPITAL ENCOUNTER (OUTPATIENT)
Dept: GENERAL RADIOLOGY | Age: 76
Discharge: HOME | End: 2021-07-28
Payer: MEDICARE

## 2021-07-28 ENCOUNTER — TRANSCRIBE ORDER (OUTPATIENT)
Dept: GENERAL RADIOLOGY | Age: 76
End: 2021-07-28
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
## 2021-07-28 PROCEDURE — 72040 X-RAY EXAM NECK SPINE 2-3 VW: CPT

## 2022-03-10 ENCOUNTER — TRANSCRIBE ORDER (OUTPATIENT)
Dept: SCHEDULING | Age: 77
End: 2022-03-10

## 2022-03-18 PROBLEM — M43.16 SPONDYLOLISTHESIS, LUMBAR REGION: Status: ACTIVE | Noted: 2019-01-07

## 2022-09-27 ENCOUNTER — TRANSCRIBE ORDER (OUTPATIENT)
Dept: SCHEDULING | Age: 77
End: 2022-09-27

## 2022-09-27 DIAGNOSIS — Z78.9 NON-SMOKER: ICD-10-CM

## 2022-09-27 DIAGNOSIS — R13.10 DYSPHAGIA: ICD-10-CM

## 2022-09-27 DIAGNOSIS — Z00.8 OTHER SPECIFIED GENERAL MEDICAL EXAMINATION: Primary | ICD-10-CM

## 2022-10-03 ENCOUNTER — HOSPITAL ENCOUNTER (OUTPATIENT)
Dept: GENERAL RADIOLOGY | Age: 77
Discharge: HOME | End: 2022-10-03
Attending: OTOLARYNGOLOGY
Payer: MEDICARE

## 2022-10-03 DIAGNOSIS — R13.10 DYSPHAGIA: ICD-10-CM

## 2022-10-03 DIAGNOSIS — Z00.8 OTHER SPECIFIED GENERAL MEDICAL EXAMINATION: ICD-10-CM

## 2022-10-03 DIAGNOSIS — Z78.9 NON-SMOKER: ICD-10-CM

## 2022-10-03 PROCEDURE — 74220 X-RAY XM ESOPHAGUS 1CNTRST: CPT

## 2022-10-06 ENCOUNTER — TRANSCRIBE ORDER (OUTPATIENT)
Dept: SCHEDULING | Age: 77
End: 2022-10-06

## 2022-10-06 DIAGNOSIS — Z78.9 NON-SMOKER: ICD-10-CM

## 2022-10-06 DIAGNOSIS — Z00.8 OTHER SPECIFIED GENERAL MEDICAL EXAMINATION: Primary | ICD-10-CM

## 2022-10-06 DIAGNOSIS — R13.10 DYSPHAGIA: ICD-10-CM

## 2022-10-12 ENCOUNTER — TRANSCRIBE ORDER (OUTPATIENT)
Dept: SCHEDULING | Age: 77
End: 2022-10-12

## 2022-10-12 DIAGNOSIS — R22.1 NECK SWELLING: Primary | ICD-10-CM

## 2022-10-14 ENCOUNTER — HOSPITAL ENCOUNTER (OUTPATIENT)
Dept: CT IMAGING | Age: 77
Discharge: HOME | End: 2022-10-14
Attending: OTOLARYNGOLOGY
Payer: MEDICARE

## 2022-10-14 DIAGNOSIS — R22.1 NECK SWELLING: ICD-10-CM

## 2022-10-14 LAB — CREAT BLD-MCNC: 1.2 MG/DL (ref 0.6–1.3)

## 2022-10-14 PROCEDURE — 70491 CT SOFT TISSUE NECK W/DYE: CPT

## 2022-10-14 PROCEDURE — 82565 ASSAY OF CREATININE: CPT

## 2022-10-14 PROCEDURE — 74011000636 HC RX REV CODE- 636: Performed by: OTOLARYNGOLOGY

## 2022-10-14 RX ADMIN — IOPAMIDOL 75 ML: 612 INJECTION, SOLUTION INTRAVENOUS at 18:21

## 2022-10-18 NOTE — PROGRESS NOTES
Notification of Positive MSSA and Treatment Ordered by Preadmission Testing Nurse Practitioner      Patient Name:  Robert Villafana  MRN: [id number]  : 1945    Surgeon: Cee Partida   Date of surgery: 18  Procedure: L4-S1 Lami/fusion    Allergies: No Known Allergies    MRSA results: All Micro Results     Procedure Component Value Units Date/Time    MRSA CULTURE NARES [[id number]]  (Abnormal) Collected:  18 1600    Order Status:  Completed Specimen:  Nares Updated:  18 2823     Special Requests: NO SPECIAL REQUESTS        Culture result:       MRSA NOT PRESENT. Apparent Staphylococus aureus (not MRSA noted). Screening of patient nares for MRSA is for surveillance purposes and, if positive, to facilitate isolation considerations in high risk settings. It is not intended for automatic decolonization interventions per se as regimens are not sufficiently effective to warrant routine use.                 Treatment ordered: Bactroban ointment 2%- apply intranasal twice daily for 5 days    Date patient notified of results / treatment prescribed: 18    The patient was instructed to add medication and date they began treatment to their \"Prior to Admission Medication\" list given to them in [address], [place]
No

## 2022-10-21 ENCOUNTER — HOSPITAL ENCOUNTER (INPATIENT)
Age: 77
LOS: 3 days | Discharge: HOME | DRG: 688 | End: 2022-10-26
Attending: EMERGENCY MEDICINE | Admitting: HOSPITALIST
Payer: MEDICARE

## 2022-10-21 ENCOUNTER — APPOINTMENT (OUTPATIENT)
Dept: GENERAL RADIOLOGY | Age: 77
DRG: 688 | End: 2022-10-21
Attending: EMERGENCY MEDICINE
Payer: MEDICARE

## 2022-10-21 ENCOUNTER — APPOINTMENT (OUTPATIENT)
Dept: CT IMAGING | Age: 77
DRG: 688 | End: 2022-10-21
Attending: EMERGENCY MEDICINE
Payer: MEDICARE

## 2022-10-21 ENCOUNTER — APPOINTMENT (OUTPATIENT)
Dept: ULTRASOUND IMAGING | Age: 77
DRG: 688 | End: 2022-10-21
Attending: EMERGENCY MEDICINE
Payer: MEDICARE

## 2022-10-21 DIAGNOSIS — R58 RETROPERITONEAL HEMORRHAGE: ICD-10-CM

## 2022-10-21 DIAGNOSIS — R53.81 DEBILITY: ICD-10-CM

## 2022-10-21 DIAGNOSIS — R07.9 CHEST PAIN, UNSPECIFIED TYPE: ICD-10-CM

## 2022-10-21 DIAGNOSIS — N28.89 RENAL MASS: Primary | ICD-10-CM

## 2022-10-21 DIAGNOSIS — Z51.5 ENCOUNTER FOR PALLIATIVE CARE: ICD-10-CM

## 2022-10-21 DIAGNOSIS — Z71.89 GOALS OF CARE, COUNSELING/DISCUSSION: ICD-10-CM

## 2022-10-21 DIAGNOSIS — Z71.89 DNR (DO NOT RESUSCITATE) DISCUSSION: ICD-10-CM

## 2022-10-21 DIAGNOSIS — E66.9 OBESITY, CLASS II, BMI 35-39.9: ICD-10-CM

## 2022-10-21 LAB
ALBUMIN SERPL-MCNC: 3.4 G/DL (ref 3.5–5)
ALBUMIN/GLOB SERPL: 0.9 {RATIO} (ref 1.1–2.2)
ALP SERPL-CCNC: 52 U/L (ref 45–117)
ALT SERPL-CCNC: 21 U/L (ref 12–78)
ANION GAP BLD CALC-SCNC: 3 MMOL/L (ref 10–20)
ANION GAP SERPL CALC-SCNC: 4 MMOL/L (ref 5–15)
APPEARANCE UR: CLEAR
AST SERPL-CCNC: 17 U/L (ref 15–37)
BACTERIA URNS QL MICRO: NEGATIVE /HPF
BASOPHILS # BLD: 0.1 K/UL (ref 0–0.1)
BASOPHILS NFR BLD: 1 % (ref 0–1)
BILIRUB SERPL-MCNC: 0.6 MG/DL (ref 0.2–1)
BILIRUB UR QL: NEGATIVE
BUN SERPL-MCNC: 24 MG/DL (ref 6–20)
BUN/CREAT SERPL: 19 (ref 12–20)
CA-I BLD-MCNC: 1.12 MMOL/L (ref 1.12–1.32)
CALCIUM SERPL-MCNC: 9.1 MG/DL (ref 8.5–10.1)
CHLORIDE BLD-SCNC: 107 MMOL/L (ref 98–107)
CHLORIDE SERPL-SCNC: 107 MMOL/L (ref 97–108)
CO2 BLD-SCNC: 25.5 MMOL/L (ref 21–32)
CO2 SERPL-SCNC: 28 MMOL/L (ref 21–32)
COLOR UR: ABNORMAL
CREAT BLD-MCNC: 1.18 MG/DL (ref 0.6–1.3)
CREAT SERPL-MCNC: 1.26 MG/DL (ref 0.7–1.3)
D DIMER PPP FEU-MCNC: 1 MG/L FEU (ref 0–0.65)
DIFFERENTIAL METHOD BLD: ABNORMAL
EOSINOPHIL # BLD: 0.2 K/UL (ref 0–0.4)
EOSINOPHIL NFR BLD: 3 % (ref 0–7)
EPITH CASTS URNS QL MICRO: NORMAL /LPF
ERYTHROCYTE [DISTWIDTH] IN BLOOD BY AUTOMATED COUNT: 15.6 % (ref 11.5–14.5)
GLOBULIN SER CALC-MCNC: 3.8 G/DL (ref 2–4)
GLUCOSE BLD-MCNC: 104 MG/DL (ref 65–100)
GLUCOSE SERPL-MCNC: 112 MG/DL (ref 65–100)
GLUCOSE UR STRIP.AUTO-MCNC: NEGATIVE MG/DL
HCT VFR BLD AUTO: 37 % (ref 36.6–50.3)
HGB BLD-MCNC: 12.1 G/DL (ref 12.1–17)
HGB UR QL STRIP: NEGATIVE
IMM GRANULOCYTES # BLD AUTO: 0 K/UL (ref 0–0.04)
IMM GRANULOCYTES NFR BLD AUTO: 0 % (ref 0–0.5)
KETONES UR QL STRIP.AUTO: NEGATIVE MG/DL
LEUKOCYTE ESTERASE UR QL STRIP.AUTO: NEGATIVE
LIPASE SERPL-CCNC: 104 U/L (ref 73–393)
LYMPHOCYTES # BLD: 1.4 K/UL (ref 0.8–3.5)
LYMPHOCYTES NFR BLD: 19 % (ref 12–49)
MAGNESIUM SERPL-MCNC: 1.9 MG/DL (ref 1.6–2.4)
MCH RBC QN AUTO: 30.3 PG (ref 26–34)
MCHC RBC AUTO-ENTMCNC: 32.7 G/DL (ref 30–36.5)
MCV RBC AUTO: 92.7 FL (ref 80–99)
MONOCYTES # BLD: 0.8 K/UL (ref 0–1)
MONOCYTES NFR BLD: 11 % (ref 5–13)
NEUTS SEG # BLD: 4.6 K/UL (ref 1.8–8)
NEUTS SEG NFR BLD: 66 % (ref 32–75)
NITRITE UR QL STRIP.AUTO: NEGATIVE
NRBC # BLD: 0 K/UL (ref 0–0.01)
NRBC BLD-RTO: 0 PER 100 WBC
PH UR STRIP: 6.5 [PH] (ref 5–8)
PLATELET # BLD AUTO: 257 K/UL (ref 150–400)
PMV BLD AUTO: 11 FL (ref 8.9–12.9)
POTASSIUM BLD-SCNC: 3.9 MMOL/L (ref 3.5–5.1)
POTASSIUM SERPL-SCNC: 4.3 MMOL/L (ref 3.5–5.1)
PROT SERPL-MCNC: 7.2 G/DL (ref 6.4–8.2)
PROT UR STRIP-MCNC: ABNORMAL MG/DL
RBC # BLD AUTO: 3.99 M/UL (ref 4.1–5.7)
RBC #/AREA URNS HPF: NORMAL /HPF (ref 0–5)
SERVICE CMNT-IMP: ABNORMAL
SODIUM BLD-SCNC: 134 MMOL/L (ref 136–145)
SODIUM SERPL-SCNC: 139 MMOL/L (ref 136–145)
SP GR UR REFRACTOMETRY: 1.01 (ref 1–1.03)
TROPONIN-HIGH SENSITIVITY: 8 NG/L (ref 0–76)
UROBILINOGEN UR QL STRIP.AUTO: 0.2 EU/DL (ref 0.2–1)
WBC # BLD AUTO: 7.1 K/UL (ref 4.1–11.1)
WBC URNS QL MICRO: NORMAL /HPF (ref 0–4)

## 2022-10-21 PROCEDURE — 74011250636 HC RX REV CODE- 250/636: Performed by: EMERGENCY MEDICINE

## 2022-10-21 PROCEDURE — 80053 COMPREHEN METABOLIC PANEL: CPT

## 2022-10-21 PROCEDURE — G0378 HOSPITAL OBSERVATION PER HR: HCPCS

## 2022-10-21 PROCEDURE — 96374 THER/PROPH/DIAG INJ IV PUSH: CPT

## 2022-10-21 PROCEDURE — 73030 X-RAY EXAM OF SHOULDER: CPT

## 2022-10-21 PROCEDURE — 85025 COMPLETE CBC W/AUTO DIFF WBC: CPT

## 2022-10-21 PROCEDURE — 81001 URINALYSIS AUTO W/SCOPE: CPT

## 2022-10-21 PROCEDURE — 71275 CT ANGIOGRAPHY CHEST: CPT

## 2022-10-21 PROCEDURE — 96376 TX/PRO/DX INJ SAME DRUG ADON: CPT

## 2022-10-21 PROCEDURE — 74011000636 HC RX REV CODE- 636: Performed by: EMERGENCY MEDICINE

## 2022-10-21 PROCEDURE — 84484 ASSAY OF TROPONIN QUANT: CPT

## 2022-10-21 PROCEDURE — 99285 EMERGENCY DEPT VISIT HI MDM: CPT

## 2022-10-21 PROCEDURE — 83735 ASSAY OF MAGNESIUM: CPT

## 2022-10-21 PROCEDURE — 80047 BASIC METABLC PNL IONIZED CA: CPT

## 2022-10-21 PROCEDURE — 74011250637 HC RX REV CODE- 250/637: Performed by: EMERGENCY MEDICINE

## 2022-10-21 PROCEDURE — 93005 ELECTROCARDIOGRAM TRACING: CPT

## 2022-10-21 PROCEDURE — 71046 X-RAY EXAM CHEST 2 VIEWS: CPT

## 2022-10-21 PROCEDURE — 85379 FIBRIN DEGRADATION QUANT: CPT

## 2022-10-21 PROCEDURE — 73521 X-RAY EXAM HIPS BI 2 VIEWS: CPT

## 2022-10-21 PROCEDURE — 74174 CTA ABD&PLVS W/CONTRAST: CPT

## 2022-10-21 PROCEDURE — 36415 COLL VENOUS BLD VENIPUNCTURE: CPT

## 2022-10-21 PROCEDURE — 83690 ASSAY OF LIPASE: CPT

## 2022-10-21 RX ORDER — ONDANSETRON 4 MG/1
4 TABLET, ORALLY DISINTEGRATING ORAL
Status: DISCONTINUED | OUTPATIENT
Start: 2022-10-21 | End: 2022-10-26 | Stop reason: HOSPADM

## 2022-10-21 RX ORDER — SODIUM CHLORIDE 0.9 % (FLUSH) 0.9 %
5-40 SYRINGE (ML) INJECTION EVERY 8 HOURS
Status: DISCONTINUED | OUTPATIENT
Start: 2022-10-21 | End: 2022-10-26 | Stop reason: HOSPADM

## 2022-10-21 RX ORDER — OXYCODONE AND ACETAMINOPHEN 5; 325 MG/1; MG/1
1 TABLET ORAL
Status: COMPLETED | OUTPATIENT
Start: 2022-10-21 | End: 2022-10-21

## 2022-10-21 RX ORDER — ACETAMINOPHEN 650 MG/1
650 SUPPOSITORY RECTAL
Status: DISCONTINUED | OUTPATIENT
Start: 2022-10-21 | End: 2022-10-25

## 2022-10-21 RX ORDER — FENTANYL CITRATE 50 UG/ML
50 INJECTION, SOLUTION INTRAMUSCULAR; INTRAVENOUS ONCE
Status: COMPLETED | OUTPATIENT
Start: 2022-10-21 | End: 2022-10-21

## 2022-10-21 RX ORDER — SODIUM CHLORIDE 0.9 % (FLUSH) 0.9 %
5-40 SYRINGE (ML) INJECTION AS NEEDED
Status: DISCONTINUED | OUTPATIENT
Start: 2022-10-21 | End: 2022-10-26 | Stop reason: HOSPADM

## 2022-10-21 RX ORDER — ONDANSETRON 2 MG/ML
4 INJECTION INTRAMUSCULAR; INTRAVENOUS
Status: DISCONTINUED | OUTPATIENT
Start: 2022-10-21 | End: 2022-10-26 | Stop reason: HOSPADM

## 2022-10-21 RX ORDER — POLYETHYLENE GLYCOL 3350 17 G/17G
17 POWDER, FOR SOLUTION ORAL DAILY PRN
Status: DISCONTINUED | OUTPATIENT
Start: 2022-10-21 | End: 2022-10-26 | Stop reason: HOSPADM

## 2022-10-21 RX ORDER — ACETAMINOPHEN 325 MG/1
650 TABLET ORAL
Status: DISCONTINUED | OUTPATIENT
Start: 2022-10-21 | End: 2022-10-25

## 2022-10-21 RX ORDER — LORAZEPAM 1 MG/1
1 TABLET ORAL
Status: COMPLETED | OUTPATIENT
Start: 2022-10-21 | End: 2022-10-21

## 2022-10-21 RX ADMIN — LORAZEPAM 1 MG: 1 TABLET ORAL at 15:48

## 2022-10-21 RX ADMIN — FENTANYL CITRATE 50 MCG: 50 INJECTION, SOLUTION INTRAMUSCULAR; INTRAVENOUS at 22:04

## 2022-10-21 RX ADMIN — IOPAMIDOL 100 ML: 755 INJECTION, SOLUTION INTRAVENOUS at 13:45

## 2022-10-21 RX ADMIN — OXYCODONE HYDROCHLORIDE AND ACETAMINOPHEN 1 TABLET: 5; 325 TABLET ORAL at 15:59

## 2022-10-21 RX ADMIN — FENTANYL CITRATE 50 MCG: 50 INJECTION, SOLUTION INTRAMUSCULAR; INTRAVENOUS at 19:45

## 2022-10-21 NOTE — ED TRIAGE NOTES
Pt arrives via [employer]. Report of \"pt started having anxiety and chest pain last night. Chest pain has resolved VSS. \" Pt denies feeling chest pain at this time, states \"I just feel a little bit out of control. I do have pain in my lower back hips and shoulders but Nancy been having that for a few months my doctor gave me a Fentanyl patch it helps just a little. \" Pt appears in no distress at this time.

## 2022-10-22 ENCOUNTER — APPOINTMENT (OUTPATIENT)
Dept: MRI IMAGING | Age: 77
DRG: 688 | End: 2022-10-22
Attending: HOSPITALIST
Payer: MEDICARE

## 2022-10-22 ENCOUNTER — APPOINTMENT (OUTPATIENT)
Dept: ULTRASOUND IMAGING | Age: 77
DRG: 688 | End: 2022-10-22
Attending: HOSPITALIST
Payer: MEDICARE

## 2022-10-22 PROCEDURE — G0378 HOSPITAL OBSERVATION PER HR: HCPCS

## 2022-10-22 PROCEDURE — 74183 MRI ABD W/O CNTR FLWD CNTR: CPT

## 2022-10-22 PROCEDURE — 74011250636 HC RX REV CODE- 250/636: Performed by: RADIOLOGY

## 2022-10-22 PROCEDURE — 76770 US EXAM ABDO BACK WALL COMP: CPT

## 2022-10-22 PROCEDURE — 74011250637 HC RX REV CODE- 250/637: Performed by: HOSPITALIST

## 2022-10-22 PROCEDURE — 96376 TX/PRO/DX INJ SAME DRUG ADON: CPT

## 2022-10-22 PROCEDURE — 74011250636 HC RX REV CODE- 250/636: Performed by: EMERGENCY MEDICINE

## 2022-10-22 PROCEDURE — A9576 INJ PROHANCE MULTIPACK: HCPCS | Performed by: RADIOLOGY

## 2022-10-22 PROCEDURE — 74011000250 HC RX REV CODE- 250: Performed by: INTERNAL MEDICINE

## 2022-10-22 RX ORDER — FAMOTIDINE 20 MG/1
20 TABLET, FILM COATED ORAL DAILY
Status: DISCONTINUED | OUTPATIENT
Start: 2022-10-22 | End: 2022-10-26 | Stop reason: HOSPADM

## 2022-10-22 RX ORDER — CLONIDINE HYDROCHLORIDE 0.1 MG/1
0.1 TABLET ORAL
Status: DISCONTINUED | OUTPATIENT
Start: 2022-10-22 | End: 2022-10-26 | Stop reason: HOSPADM

## 2022-10-22 RX ORDER — OMEPRAZOLE 20 MG/1
20 CAPSULE, DELAYED RELEASE ORAL DAILY
COMMUNITY

## 2022-10-22 RX ORDER — MELATONIN
2000
Status: DISCONTINUED | OUTPATIENT
Start: 2022-10-22 | End: 2022-10-26 | Stop reason: HOSPADM

## 2022-10-22 RX ORDER — TRAZODONE HYDROCHLORIDE 50 MG/1
TABLET ORAL
COMMUNITY

## 2022-10-22 RX ORDER — LISINOPRIL 20 MG/1
40 TABLET ORAL DAILY
Status: DISCONTINUED | OUTPATIENT
Start: 2022-10-22 | End: 2022-10-26 | Stop reason: HOSPADM

## 2022-10-22 RX ORDER — OXYCODONE AND ACETAMINOPHEN 7.5; 325 MG/1; MG/1
TABLET ORAL
COMMUNITY

## 2022-10-22 RX ORDER — TAMSULOSIN HYDROCHLORIDE 0.4 MG/1
0.4 CAPSULE ORAL DAILY
Status: DISCONTINUED | OUTPATIENT
Start: 2022-10-22 | End: 2022-10-26 | Stop reason: HOSPADM

## 2022-10-22 RX ORDER — GUAIFENESIN 100 MG/5ML
81 LIQUID (ML) ORAL DAILY
COMMUNITY
End: 2022-10-26

## 2022-10-22 RX ORDER — CARVEDILOL 12.5 MG/1
25 TABLET ORAL 2 TIMES DAILY WITH MEALS
Status: DISCONTINUED | OUTPATIENT
Start: 2022-10-22 | End: 2022-10-26 | Stop reason: HOSPADM

## 2022-10-22 RX ORDER — FENTANYL CITRATE 50 UG/ML
50 INJECTION, SOLUTION INTRAMUSCULAR; INTRAVENOUS ONCE
Status: COMPLETED | OUTPATIENT
Start: 2022-10-22 | End: 2022-10-22

## 2022-10-22 RX ORDER — AMLODIPINE BESYLATE 5 MG/1
10 TABLET ORAL DAILY
Status: DISCONTINUED | OUTPATIENT
Start: 2022-10-22 | End: 2022-10-22 | Stop reason: SDUPTHER

## 2022-10-22 RX ORDER — GUAIFENESIN 100 MG/5ML
81 LIQUID (ML) ORAL DAILY
Status: DISCONTINUED | OUTPATIENT
Start: 2022-10-22 | End: 2022-10-26 | Stop reason: HOSPADM

## 2022-10-22 RX ORDER — FENTANYL 12.5 UG/1
1 PATCH TRANSDERMAL
COMMUNITY
End: 2022-10-26

## 2022-10-22 RX ORDER — ATORVASTATIN CALCIUM 20 MG/1
20 TABLET, FILM COATED ORAL
Status: DISCONTINUED | OUTPATIENT
Start: 2022-10-22 | End: 2022-10-26 | Stop reason: HOSPADM

## 2022-10-22 RX ORDER — AMLODIPINE BESYLATE 5 MG/1
10 TABLET ORAL DAILY
Status: DISCONTINUED | OUTPATIENT
Start: 2022-10-22 | End: 2022-10-26 | Stop reason: HOSPADM

## 2022-10-22 RX ORDER — FENOFIBRATE 160 MG/1
160 TABLET ORAL
Status: DISCONTINUED | OUTPATIENT
Start: 2022-10-22 | End: 2022-10-26 | Stop reason: HOSPADM

## 2022-10-22 RX ORDER — FISH OIL/DHA/EPA 1200-144MG
1200 CAPSULE ORAL
COMMUNITY
End: 2022-10-26

## 2022-10-22 RX ORDER — TRAZODONE HYDROCHLORIDE 50 MG/1
50 TABLET ORAL
Status: DISCONTINUED | OUTPATIENT
Start: 2022-10-22 | End: 2022-10-26 | Stop reason: HOSPADM

## 2022-10-22 RX ORDER — QUETIAPINE FUMARATE 25 MG/1
50 TABLET, FILM COATED ORAL 2 TIMES DAILY
Status: DISCONTINUED | OUTPATIENT
Start: 2022-10-22 | End: 2022-10-26 | Stop reason: HOSPADM

## 2022-10-22 RX ADMIN — QUETIAPINE FUMARATE 50 MG: 25 TABLET ORAL at 13:33

## 2022-10-22 RX ADMIN — CLONIDINE HYDROCHLORIDE 0.1 MG: 0.1 TABLET ORAL at 20:21

## 2022-10-22 RX ADMIN — TRAZODONE HYDROCHLORIDE 50 MG: 50 TABLET ORAL at 20:21

## 2022-10-22 RX ADMIN — LISINOPRIL 40 MG: 20 TABLET ORAL at 13:35

## 2022-10-22 RX ADMIN — SODIUM CHLORIDE, PRESERVATIVE FREE 10 ML: 5 INJECTION INTRAVENOUS at 13:35

## 2022-10-22 RX ADMIN — CARVEDILOL 25 MG: 12.5 TABLET, FILM COATED ORAL at 13:33

## 2022-10-22 RX ADMIN — FENTANYL CITRATE 50 MCG: 50 INJECTION, SOLUTION INTRAMUSCULAR; INTRAVENOUS at 05:40

## 2022-10-22 RX ADMIN — FENOFIBRATE 160 MG: 160 TABLET ORAL at 20:21

## 2022-10-22 RX ADMIN — SODIUM CHLORIDE, PRESERVATIVE FREE 10 ML: 5 INJECTION INTRAVENOUS at 20:21

## 2022-10-22 RX ADMIN — CARVEDILOL 25 MG: 12.5 TABLET, FILM COATED ORAL at 18:40

## 2022-10-22 RX ADMIN — TAMSULOSIN HYDROCHLORIDE 0.4 MG: 0.4 CAPSULE ORAL at 13:32

## 2022-10-22 RX ADMIN — ATORVASTATIN CALCIUM 20 MG: 20 TABLET, FILM COATED ORAL at 20:21

## 2022-10-22 RX ADMIN — AMLODIPINE BESYLATE 10 MG: 5 TABLET ORAL at 13:33

## 2022-10-22 RX ADMIN — QUETIAPINE FUMARATE 50 MG: 25 TABLET ORAL at 18:40

## 2022-10-22 RX ADMIN — FAMOTIDINE 20 MG: 20 TABLET, FILM COATED ORAL at 13:33

## 2022-10-22 RX ADMIN — GADOTERIDOL 19 ML: 279.3 INJECTION, SOLUTION INTRAVENOUS at 11:48

## 2022-10-22 RX ADMIN — Medication 2000 UNITS: at 13:32

## 2022-10-22 NOTE — H&P
[facility]  HISTORY AND PHYSICAL    Name:  Thaddeus Wayne  MR#:  [id number]  :  1945  ACCOUNT #:  [de-identified]  ADMIT DATE:  10/21/2022    PRIMARY CARE PHYSICIAN:  Unknown. PRESENTING COMPLAINT:  Back pain. HISTORY OF PRESENT ILLNESS:      The patient is a 68-year-old  male who is admitted directly to this hospital.  I have spoken with his daughter who has given most of the history. The patient has multiple medical problems including diabetes, hyperlipidemia, anxiety, depression, dysphagia, chronic pain, memory loss, dementia, hypertension, and aspiration pneumonia. The patient was mainly sent to the hospital because of back pain which started a few days ago. The patient has no falls. The patient has poorly-controlled high blood pressure. He denies having any chest pain or shortness of breath. He was evaluated in the emergency room. CT scan was done, which showed no acute finding. However, it showed a 2.1 x 1.5 cm mass in the left kidney with a small amount of left retroperitoneal hemorrhage within the perirenal and pararenal space. It was decided to admit the patient for further evaluation. The patient is a poor historian. According to the daughter, he was living alone. She called the Adult Protective Services as he was not taking care of himself. Recently also had some problem with aspiration pneumonia and failed barium study. He was seen by ENT. A CT of the neck was done by ENT which was unremarkable  The patient denies having any problem with urination. He has no chest pain. PAST MEDICAL HISTORY:  Significant for:  1. End-stage renal disease stage III. 2.  Diabetes. 3.  Hyperlipidemia. 4.  Anxiety and depression. 5.  Dysphagia. 6.  Chronic pain. 7.  Memory loss questionable due to dementia. 8.  Hypertension. 9.  Aspiration. 10.  Obesity. 11.  Sleep apnea.     PAST SURGICAL HISTORY:  Significant for cervical fusion, cholecystectomy, colonoscopy and tonsillectomy. FAMILY HISTORY:  Significant for heart disease, kidney disease, diabetes. Sister has colon cancer. CODE STATUS:  Full code. REVIEW OF SYSTEMS:  Negative except as mentioned in history of presenting illness. All systems were reviewed. No other positive finding was noticed. MEDICATIONS:  Medications prior to admission include the following. The patient is on:  1. Benazepril 40 mg daily. 2.  Amlodipine 10 mg daily. 3.  Aspirin 81 mg daily. 4.  Lipitor 20 mg daily. 5.  Vitamin D3 2000 units daily. 6.  Clonidine 0.1 mg daily. 7.  Pepcid 20 daily. 8.  Lofibra 160 daily. 9.  Quetiapine 50 b.i.d. 10.  Flomax 0.4 daily. 11.  Trazodone 50 mg at bedtime. PHYSICAL EXAMINATION:  GENERAL:  The patient is a 72-year-old obese gentleman, not in any acute distress. VITAL SIGNS:  Reveal a temperature of 97.5, pulse is 60, blood pressure 153/64, respiratory rate is 14, and saturation is 93%. HEENT:  Reveals pupils equally reactive to light and accommodation. NECK:  Supple. There is no lymphadenopathy or JVD. CHEST:  Chest is clear. No wheezing or crackles. CARDIOVASCULAR:  S1 and S2 regular. No murmur. No S3.  ABDOMEN:  Revealed no tenderness. No guarding, no rigidity. Bowel sounds are active. BACK:  Reveals some mild tenderness in the lower paralumbar area. CNS:  Unremarkable. The patient is alert and oriented, answers all the questions, moving extremities. Plantars are downgoing. LABORATORY DATA:  White count 7.1, hemoglobin of 12.1, hematocrit is 37, platelet count is 644,554. Sodium 139, potassium 4.3, chloride 107, bicarb is 28, gap of 4, glucose 112, BUN is 24, creatinine is 1.26, calcium is 9.1, protein 7.2, albumin is 3.4. AST, ALT, alk phos are normal.  High-sensitivity troponin is 8. Urinalysis revealed trace of proteinuria. IMAGING DATA:  EKG shows sinus bradycardia with low voltage QRS.     CT of the chest with and without contrast shows no acute vascular abnormality. He has a small amount of left retroperitoneal hemorrhage within the perirenal and pararenal spaces, extending inferiorly along the anterior aspect of the left psoas muscle. No evidence of active arterial extravasation. He also has indeterminate exophytic hypodense lesion in the lower pole of the left kidney measuring 2.5 x 1.5 cm. Recommend MRI renal protocol. He also had hepatic steatosis. X-ray of the shoulder shows degenerative changes of the acromioclavicular joint and glenohumeral joint with calcific tendinitis of the supraspinatus insertion. CT of the neck which was done on 10/14/2022 showed no mass lesion. ASSESSMENT AND PLAN:      The patient is a 70-year-old gentleman who has multiple medical problems including diabetes, obesity, hyperlipidemia, hypertension, memory loss, and aspiration is being admitted with:    1. Back pain. Imaging is suggestive of left kidney exophytic mass. We will order MRI with renal protocol and consult  Urology. Renal US ordered as well. 2.  Hold aspirin. 3.  History of neurocognitive impairment, has been seen by Neuropsych. We will monitor closely. 4.  History of hypertension. We will continue current regimen. 5.  History of chronic kidney disease stage III. We will consult Nephrology. 6.  History of aspiration. We will ask Speech to see him. The patient's daughter says that they probably will agree to do Palliative Care consult as the patient is not cooperative. 7.  Depending on MRI, we will decide further workup. Small Retroperitoneal hemorrhage stable will monitor if bigger may need GS to evaluate. 8.  Deep venous thrombosis prophylaxis with sequential compression devices.       MD ROSHAN [email or web address]  D:  10/22/2022 9:48  T:  10/22/2022 11:29  JOB #:  [id number]

## 2022-10-22 NOTE — PROGRESS NOTES
Chart and Imaging studies reviewed  Small exophytic left renal mass on CT and MRI  Small amount of retroperitoneal fluid, stable on MRI    Renal mass can be evaluated as outpatient.  Based on his age and co morbidities would likely not recommend any intervention

## 2022-10-22 NOTE — ED NOTES
Timeout: EMTALA completed. Report to ADIEL Barraza of [facility]. Discharge or Transfer Assessment: Patient A&O x4 and in no distress. Physical re-examination reveals some improvement in pts condition with reassessment of vital signs completed at the time of admission transfer and/or discharge.

## 2022-10-22 NOTE — PROGRESS NOTES
Speech pathology note  Reviewed chart; note patient just admitted and H&P pending. Note patient pending MRI abd w wo cont. SLP will hold for now and follow for formal swallowing evaluation as additional information available in the chart, and patient cleared for PO intake after tests. Thank you.     Terra Mejia Sa., CCC-SLP

## 2022-10-23 PROBLEM — N28.89 RENAL MASS: Status: ACTIVE | Noted: 2022-01-01

## 2022-10-23 LAB
ANION GAP SERPL CALC-SCNC: 6 MMOL/L (ref 5–15)
ATRIAL RATE: 54 BPM
BUN SERPL-MCNC: 25 MG/DL (ref 6–20)
BUN/CREAT SERPL: 22 (ref 12–20)
CALCIUM SERPL-MCNC: 8.8 MG/DL (ref 8.5–10.1)
CALCULATED P AXIS, ECG09: 63 DEGREES
CALCULATED R AXIS, ECG10: 5 DEGREES
CALCULATED T AXIS, ECG11: 57 DEGREES
CHLORIDE SERPL-SCNC: 110 MMOL/L (ref 97–108)
CO2 SERPL-SCNC: 26 MMOL/L (ref 21–32)
CREAT SERPL-MCNC: 1.16 MG/DL (ref 0.7–1.3)
DIAGNOSIS, 93000: NORMAL
ERYTHROCYTE [DISTWIDTH] IN BLOOD BY AUTOMATED COUNT: 15.6 % (ref 11.5–14.5)
GLUCOSE SERPL-MCNC: 148 MG/DL (ref 65–100)
HCT VFR BLD AUTO: 33 % (ref 36.6–50.3)
HGB BLD-MCNC: 10.8 G/DL (ref 12.1–17)
MCH RBC QN AUTO: 30.2 PG (ref 26–34)
MCHC RBC AUTO-ENTMCNC: 32.7 G/DL (ref 30–36.5)
MCV RBC AUTO: 92.2 FL (ref 80–99)
NRBC # BLD: 0 K/UL (ref 0–0.01)
NRBC BLD-RTO: 0 PER 100 WBC
P-R INTERVAL, ECG05: 190 MS
PLATELET # BLD AUTO: 240 K/UL (ref 150–400)
PMV BLD AUTO: 10.9 FL (ref 8.9–12.9)
POTASSIUM SERPL-SCNC: 3.8 MMOL/L (ref 3.5–5.1)
Q-T INTERVAL, ECG07: 398 MS
QRS DURATION, ECG06: 88 MS
QTC CALCULATION (BEZET), ECG08: 377 MS
RBC # BLD AUTO: 3.58 M/UL (ref 4.1–5.7)
SODIUM SERPL-SCNC: 142 MMOL/L (ref 136–145)
VENTRICULAR RATE, ECG03: 54 BPM
WBC # BLD AUTO: 6.3 K/UL (ref 4.1–11.1)

## 2022-10-23 PROCEDURE — 74011000250 HC RX REV CODE- 250: Performed by: INTERNAL MEDICINE

## 2022-10-23 PROCEDURE — G0378 HOSPITAL OBSERVATION PER HR: HCPCS

## 2022-10-23 PROCEDURE — 74011250637 HC RX REV CODE- 250/637: Performed by: HOSPITALIST

## 2022-10-23 PROCEDURE — 65270000029 HC RM PRIVATE

## 2022-10-23 PROCEDURE — 36415 COLL VENOUS BLD VENIPUNCTURE: CPT

## 2022-10-23 PROCEDURE — 85027 COMPLETE CBC AUTOMATED: CPT

## 2022-10-23 PROCEDURE — 80048 BASIC METABOLIC PNL TOTAL CA: CPT

## 2022-10-23 RX ORDER — OXYCODONE AND ACETAMINOPHEN 7.5; 325 MG/1; MG/1
1 TABLET ORAL
Status: DISCONTINUED | OUTPATIENT
Start: 2022-10-23 | End: 2022-10-26 | Stop reason: HOSPADM

## 2022-10-23 RX ORDER — FENTANYL 12.5 UG/1
1 PATCH TRANSDERMAL
Status: DISCONTINUED | OUTPATIENT
Start: 2022-10-23 | End: 2022-10-25

## 2022-10-23 RX ADMIN — CARVEDILOL 25 MG: 12.5 TABLET, FILM COATED ORAL at 17:37

## 2022-10-23 RX ADMIN — AMLODIPINE BESYLATE 10 MG: 5 TABLET ORAL at 09:59

## 2022-10-23 RX ADMIN — SODIUM CHLORIDE, PRESERVATIVE FREE 10 ML: 5 INJECTION INTRAVENOUS at 17:37

## 2022-10-23 RX ADMIN — ATORVASTATIN CALCIUM 20 MG: 20 TABLET, FILM COATED ORAL at 21:12

## 2022-10-23 RX ADMIN — CLONIDINE HYDROCHLORIDE 0.1 MG: 0.1 TABLET ORAL at 21:12

## 2022-10-23 RX ADMIN — SODIUM CHLORIDE, PRESERVATIVE FREE 10 ML: 5 INJECTION INTRAVENOUS at 21:12

## 2022-10-23 RX ADMIN — Medication 2000 UNITS: at 09:59

## 2022-10-23 RX ADMIN — QUETIAPINE FUMARATE 50 MG: 25 TABLET ORAL at 09:59

## 2022-10-23 RX ADMIN — SODIUM CHLORIDE, PRESERVATIVE FREE 10 ML: 5 INJECTION INTRAVENOUS at 05:16

## 2022-10-23 RX ADMIN — OXYCODONE AND ACETAMINOPHEN 1 TABLET: 7.5; 325 TABLET ORAL at 21:12

## 2022-10-23 RX ADMIN — LISINOPRIL 40 MG: 20 TABLET ORAL at 09:59

## 2022-10-23 RX ADMIN — CARVEDILOL 25 MG: 12.5 TABLET, FILM COATED ORAL at 09:59

## 2022-10-23 RX ADMIN — TRAZODONE HYDROCHLORIDE 50 MG: 50 TABLET ORAL at 21:12

## 2022-10-23 RX ADMIN — QUETIAPINE FUMARATE 50 MG: 25 TABLET ORAL at 17:37

## 2022-10-23 RX ADMIN — TAMSULOSIN HYDROCHLORIDE 0.4 MG: 0.4 CAPSULE ORAL at 09:59

## 2022-10-23 RX ADMIN — FENOFIBRATE 160 MG: 160 TABLET ORAL at 21:12

## 2022-10-23 RX ADMIN — FAMOTIDINE 20 MG: 20 TABLET, FILM COATED ORAL at 09:59

## 2022-10-23 NOTE — PROGRESS NOTES
Hospitalist Progress Note                               Mason Brown MD                                     Answering service: [phone]                               OR [phone] from in house phone                                         Date of Service:  10/23/2022  NAME:  Evan Chu  :  1945  MRN:  [id number]      Admission Summary:   The patient is a 29-year-old  male who is admitted directly to this hospital.  I have spoken with his daughter who has given most of the history. The patient has multiple medical problems including diabetes, hyperlipidemia, anxiety, depression, dysphagia, chronic pain, memory loss, dementia, hypertension, and aspiration pneumonia. The patient was mainly sent to the hospital because of back pain which started a few days ago. The patient has no falls. The patient has poorly-controlled high blood pressure. He denies having any chest pain or shortness of breath. He was evaluated in the emergency room. CT scan was done, which showed no acute finding. However, it showed a 2.1 x 1.5 cm mass in the left kidney with a small amount of left retroperitoneal hemorrhage within the perirenal and pararenal space. It was decided to admit the patient for further evaluation. The patient is a poor historian. According to the daughter, he was living alone. She called the Adult Protective Services as he was not taking care of himself. Recently also had some problem with aspiration pneumonia and failed barium study. He was seen by ENT. A CT of the neck was done by ENT which was unremarkable  The patient denies having any problem with urination. He has no chest pain    Reason for follow up:    Back pain is better does not want any stronger pain meds     Assessment & Plan:     1. Back pain. Imaging is suggestive of left kidney exophytic mass. MRI showed 1.  1.8 x 1.6 x 1.7 cm enhancing mass at the inferior pole of the LEFT kidney  suspicious for RCC unless proven otherwise. Persistent, mild LEFT perinephric stranding. No new or increased fluid  collection is seen. No abdominal adenopathy is seen    Urology help appreciated will need out patient follow up. I tried to call her daughter few times no one available. 2.  Hold aspirin. Hemoglobin slightly lower monitor   3. History of neurocognitive impairment, has been seen by Neuropsych. We will monitor closely. 4.  History of hypertension. We will continue current regimen. 5.  History of chronic kidney disease stage III. 6.  History of aspiration. We will ask Speech to see him. The patient's daughter says that they probably will agree to do Palliative Care consult as the patient is not cooperative. 7.  Small Retroperitoneal hemorrhage stable will monitor if bigger may need GS to evaluate. 8.  Deep venous thrombosis prophylaxis with sequential compression devices. Code status: Full  DVT prophylaxis: SCD  Care Plan discussed with: Patient  Patient has given Verbal permission to discuss medical care with   persons present in the room and and also with contact as listed on face sheet. Discharge planning/disposition:Possibly in am     Hospital Problems  Never Reviewed            Codes Class Noted POA    Chest pain ICD-10-CM: R07.9  ICD-9-CM: 786.50  10/21/2022 Unknown             Review of Systems:   A comprehensive review of systems was negative except for that written in the HPI. Physical Examination:      Last 24hrs VS reviewed since prior progress note. Most recent are:  Visit Vitals  /62 (BP 1 Location: Left upper arm, BP Patient Position: At rest;Lying)   Pulse (!) 54   Temp 97.9 °F (36.6 °C)   Resp 18   Ht 5' 6\" (1.676 m)   Wt 99.8 kg (220 lb)   SpO2 93%   BMI 35.51 kg/m²           Constitutional:  No acute distress, cooperative, pleasant    HEENT: Head is a traumatic,  Un icteric sclera. Pink conjunctiva,no erythema or discharge. Oral mucous moist, oropharynx benign. Neck supple,    Resp:  CTA bilaterally. No wheezing/rhonchi/rales. No accessory muscle use   CV:  Regular rhythm, normal rate, no murmurs, gallops, rubs    GI:  Soft, non distended, non tender. normoactive bowel sounds, no hepatosplenomegaly    :  No CVA or suprapubic tenderness   Skin  :  No erythema,rash,bullae,dipigmentation     Musculoskeletal:  No edema, warm, 2+ pulses throughout slight tenderness in back     Neurologic:  AAOx3, CN II-XII reviewed. Moves all extremities. Skin:  Good turgor, no rashes or ulcers       Intake/Output Summary (Last 24 hours) at 10/23/2022 0820  Last data filed at 10/22/2022 1804  Gross per 24 hour   Intake 236 ml   Output --   Net 236 ml          Data Review:    Review and/or order of clinical lab test      Labs:     Recent Labs     10/23/22  0005 10/21/22  1117   WBC 6.3 7.1   HGB 10.8* 12.1   HCT 33.0* 37.0    257     Recent Labs     10/23/22  0005 10/21/22  1117    139   K 3.8 4.3   * 107   CO2 26 28   BUN 25* 24*   CREA 1.16 1.26   * 112*   CA 8.8 9.1   MG  --  1.9     Recent Labs     10/21/22  1117   ALT 21   AP 52   TBILI 0.6   TP 7.2   ALB 3.4*   GLOB 3.8   LPSE 104     No results for input(s): INR, PTP, APTT, INREXT in the last 72 hours. No results for input(s): FE, TIBC, PSAT, FERR in the last 72 hours. No results found for: FOL, RBCF   No results for input(s): PH, PCO2, PO2 in the last 72 hours. No results for input(s): CPK, CKNDX, TROIQ in the last 72 hours.     No lab exists for component: CPKMB  No results found for: CHOL, CHOLX, CHLST, CHOLV, HDL, HDLP, LDL, LDLC, DLDLP, TGLX, TRIGL, TRIGP, CHHD, CHHDX  Lab Results   Component Value Date/Time    Glucose (POC) 104 (H) 10/21/2022 01:01 PM    Glucose (POC) 112 (H) 01/11/2019 04:16 PM    Glucose (POC) 116 (H) 01/11/2019 11:10 AM    Glucose (POC) 148 (H) 01/11/2019 06:49 AM    Glucose (POC) 187 (H) 01/10/2019 09:45 PM    Glucose (POC) 147 (H) 01/10/2019 04:33 PM     Lab Results   Component Value Date/Time    Color YELLOW/STRAW 10/21/2022 12:07 PM    Appearance CLEAR 10/21/2022 12:07 PM    Specific gravity 1.010 10/21/2022 12:07 PM    Specific gravity 1.011 01/11/2019 02:57 PM    pH (UA) 6.5 10/21/2022 12:07 PM    Protein TRACE (A) 10/21/2022 12:07 PM    Glucose Negative 10/21/2022 12:07 PM    Ketone Negative 10/21/2022 12:07 PM    Bilirubin Negative 10/21/2022 12:07 PM    Urobilinogen 0.2 10/21/2022 12:07 PM    Nitrites Negative 10/21/2022 12:07 PM    Leukocyte Esterase Negative 10/21/2022 12:07 PM    Epithelial cells FEW 10/21/2022 12:07 PM    Bacteria Negative 10/21/2022 12:07 PM    WBC 0-4 10/21/2022 12:07 PM    RBC 0-5 10/21/2022 12:07 PM         Medications Reviewed:     Current Facility-Administered Medications   Medication Dose Route Frequency    atorvastatin (LIPITOR) tablet 20 mg  20 mg Oral QHS    carvediloL (COREG) tablet 25 mg  25 mg Oral BID WITH MEALS    cholecalciferol (VITAMIN D3) (1000 Units /25 mcg) tablet 2,000 Units  2,000 Units Oral DAILY WITH BREAKFAST    fenofibrate (LOFIBRA) tablet 160 mg  160 mg Oral QHS    cloNIDine HCL (CATAPRES) tablet 0.1 mg  0.1 mg Oral QHS    traZODone (DESYREL) tablet 50 mg  50 mg Oral QHS    QUEtiapine (SEROquel) tablet 50 mg  50 mg Oral BID    tamsulosin (FLOMAX) capsule 0.4 mg  0.4 mg Oral DAILY    famotidine (PEPCID) tablet 20 mg  20 mg Oral DAILY    [Held by provider] aspirin chewable tablet 81 mg  81 mg Oral DAILY    amLODIPine (NORVASC) tablet 10 mg  10 mg Oral DAILY    And    lisinopriL (PRINIVIL, ZESTRIL) tablet 40 mg  40 mg Oral DAILY    sodium chloride (NS) flush 5-40 mL  5-40 mL IntraVENous Q8H    sodium chloride (NS) flush 5-40 mL  5-40 mL IntraVENous PRN    acetaminophen (TYLENOL) tablet 650 mg  650 mg Oral Q6H PRN    Or    acetaminophen (TYLENOL) suppository 650 mg  650 mg Rectal Q6H PRN    polyethylene glycol (MIRALAX) packet 17 g  17 g Oral DAILY PRN    ondansetron (ZOFRAN ODT) tablet 4 mg  4 mg Oral Q8H PRN    Or    ondansetron (ZOFRAN) injection 4 mg  4 mg IntraVENous Q6H PRN     ______________________________________________________________________  EXPECTED LENGTH OF STAY: - - -  ACTUAL LENGTH OF STAY:          0                 Susan Iyer MD

## 2022-10-23 NOTE — PROGRESS NOTES
Problem: Falls - Risk of  Goal: *Absence of Falls  Description: Document Louie Fall Risk and appropriate interventions in the flowsheet. Outcome: Progressing Towards Goal  Note: Fall Risk Interventions:  Mobility Interventions: Bed/chair exit alarm, Patient to call before getting OOB         Medication Interventions: Bed/chair exit alarm, Patient to call before getting OOB, Teach patient to arise slowly    Elimination Interventions: Call light in reach, Bed/chair exit alarm    History of Falls Interventions: Bed/chair exit alarm         Problem: Patient Education: Go to Patient Education Activity  Goal: Patient/Family Education  Outcome: Progressing Towards Goal     Problem: Hypertension  Goal: *Blood pressure within specified parameters  Outcome: Progressing Towards Goal  Goal: *Fluid volume balance  Outcome: Progressing Towards Goal  Goal: *Labs within defined limits  Outcome: Progressing Towards Goal     Problem: Patient Education: Go to Patient Education Activity  Goal: Patient/Family Education  Outcome: Progressing Towards Goal     Problem: Diabetes Self-Management  Goal: *Disease process and treatment process  Description: Define diabetes and identify own type of diabetes; list 3 options for treating diabetes. Outcome: Progressing Towards Goal  Goal: *Incorporating nutritional management into lifestyle  Description: Describe effect of type, amount and timing of food on blood glucose; list 3 methods for planning meals. Outcome: Progressing Towards Goal  Goal: *Incorporating physical activity into lifestyle  Description: State effect of exercise on blood glucose levels. Outcome: Progressing Towards Goal  Goal: *Developing strategies to promote health/change behavior  Description: Define the ABC's of diabetes; identify appropriate screenings, schedule and personal plan for screenings.   Outcome: Progressing Towards Goal  Goal: *Using medications safely  Description: State effect of diabetes medications on diabetes; name diabetes medication taking, action and side effects. Outcome: Progressing Towards Goal  Goal: *Monitoring blood glucose, interpreting and using results  Description: Identify recommended blood glucose targets  and personal targets. Outcome: Progressing Towards Goal  Goal: *Prevention, detection, treatment of acute complications  Description: List symptoms of hyper- and hypoglycemia; describe how to treat low blood sugar and actions for lowering  high blood glucose level. Outcome: Progressing Towards Goal  Goal: *Prevention, detection and treatment of chronic complications  Description: Define the natural course of diabetes and describe the relationship of blood glucose levels to long term complications of diabetes.   Outcome: Progressing Towards Goal  Goal: *Developing strategies to address psychosocial issues  Description: Describe feelings about living with diabetes; identify support needed and support network  Outcome: Progressing Towards Goal  Goal: *Insulin pump training  Outcome: Progressing Towards Goal  Goal: *Sick day guidelines  Outcome: Progressing Towards Goal  Goal: *Patient Specific Goal (EDIT GOAL, INSERT TEXT)  Outcome: Progressing Towards Goal     Problem: Patient Education: Go to Patient Education Activity  Goal: Patient/Family Education  Outcome: Progressing Towards Goal     Problem: Patient Education: Go to Patient Education Activity  Goal: Patient/Family Education  Outcome: Progressing Towards Goal     Problem: Unstable angina/NSTEMI: Day of Admission/Day 1  Goal: Off Pathway (Use only if patient is Off Pathway)  Outcome: Progressing Towards Goal  Goal: Activity/Safety  Outcome: Progressing Towards Goal  Goal: Consults, if ordered  Outcome: Progressing Towards Goal  Goal: Diagnostic Test/Procedures  Outcome: Progressing Towards Goal  Goal: Nutrition/Diet  Outcome: Progressing Towards Goal  Goal: Discharge Planning  Outcome: Progressing Towards Goal  Goal: Medications  Outcome: Progressing Towards Goal  Goal: Respiratory  Outcome: Progressing Towards Goal  Goal: Treatments/Interventions/Procedures  Outcome: Progressing Towards Goal  Goal: Psychosocial  Outcome: Progressing Towards Goal  Goal: *Hemodynamically stable  Outcome: Progressing Towards Goal  Goal: *Optimal pain control at patient's stated goal  Outcome: Progressing Towards Goal  Goal: *Lungs clear or at baseline  Outcome: Progressing Towards Goal

## 2022-10-23 NOTE — PROGRESS NOTES
Johnna Ahmadi (Daughter) called to see if the MRI and ultrasound results are back. Daughter would like MD to call daughter if she is not at the bedside for the results.      Phone number is [phone]

## 2022-10-23 NOTE — PROGRESS NOTES
Problem: Falls - Risk of  Goal: *Absence of Falls  Description: Document Sofia Fothergill Fall Risk and appropriate interventions in the flowsheet. Outcome: Progressing Towards Goal  Note: Fall Risk Interventions:  Mobility Interventions: Utilize walker, cane, or other assistive device, Utilize gait belt for transfers/ambulation, Patient to call before getting OOB, Bed/chair exit alarm         Medication Interventions: Patient to call before getting OOB, Teach patient to arise slowly    Elimination Interventions: Bed/chair exit alarm, Call light in reach, Patient to call for help with toileting needs, Toileting schedule/hourly rounds    History of Falls Interventions: Bed/chair exit alarm         Problem: Patient Education: Go to Patient Education Activity  Goal: Patient/Family Education  Outcome: Progressing Towards Goal     Problem: Hypertension  Goal: *Blood pressure within specified parameters  Outcome: Progressing Towards Goal  Goal: *Fluid volume balance  Outcome: Progressing Towards Goal  Goal: *Labs within defined limits  Outcome: Progressing Towards Goal     Problem: Patient Education: Go to Patient Education Activity  Goal: Patient/Family Education  Outcome: Progressing Towards Goal     Problem: Diabetes Self-Management  Goal: *Disease process and treatment process  Description: Define diabetes and identify own type of diabetes; list 3 options for treating diabetes. Outcome: Progressing Towards Goal  Goal: *Incorporating nutritional management into lifestyle  Description: Describe effect of type, amount and timing of food on blood glucose; list 3 methods for planning meals. Outcome: Progressing Towards Goal  Goal: *Incorporating physical activity into lifestyle  Description: State effect of exercise on blood glucose levels.   Outcome: Progressing Towards Goal  Goal: *Developing strategies to promote health/change behavior  Description: Define the ABC's of diabetes; identify appropriate screenings, schedule and personal plan for screenings. Outcome: Progressing Towards Goal  Goal: *Using medications safely  Description: State effect of diabetes medications on diabetes; name diabetes medication taking, action and side effects. Outcome: Progressing Towards Goal  Goal: *Monitoring blood glucose, interpreting and using results  Description: Identify recommended blood glucose targets  and personal targets. Outcome: Progressing Towards Goal  Goal: *Prevention, detection, treatment of acute complications  Description: List symptoms of hyper- and hypoglycemia; describe how to treat low blood sugar and actions for lowering  high blood glucose level. Outcome: Progressing Towards Goal  Goal: *Prevention, detection and treatment of chronic complications  Description: Define the natural course of diabetes and describe the relationship of blood glucose levels to long term complications of diabetes.   Outcome: Progressing Towards Goal  Goal: *Developing strategies to address psychosocial issues  Description: Describe feelings about living with diabetes; identify support needed and support network  Outcome: Progressing Towards Goal  Goal: *Insulin pump training  Outcome: Progressing Towards Goal  Goal: *Sick day guidelines  Outcome: Progressing Towards Goal  Goal: *Patient Specific Goal (EDIT GOAL, INSERT TEXT)  Outcome: Progressing Towards Goal     Problem: Patient Education: Go to Patient Education Activity  Goal: Patient/Family Education  Outcome: Progressing Towards Goal     Problem: Patient Education: Go to Patient Education Activity  Goal: Patient/Family Education  Outcome: Progressing Towards Goal     Problem: Unstable angina/NSTEMI: Day of Admission/Day 1  Goal: Off Pathway (Use only if patient is Off Pathway)  Outcome: Progressing Towards Goal  Goal: Activity/Safety  Outcome: Progressing Towards Goal  Goal: Consults, if ordered  Outcome: Progressing Towards Goal  Goal: Diagnostic Test/Procedures  Outcome: Progressing Towards Goal  Goal: Nutrition/Diet  Outcome: Progressing Towards Goal  Goal: Discharge Planning  Outcome: Progressing Towards Goal  Goal: Medications  Outcome: Progressing Towards Goal  Goal: Respiratory  Outcome: Progressing Towards Goal  Goal: Treatments/Interventions/Procedures  Outcome: Progressing Towards Goal  Goal: Psychosocial  Outcome: Progressing Towards Goal  Goal: *Hemodynamically stable  Outcome: Progressing Towards Goal  Goal: *Optimal pain control at patient's stated goal  Outcome: Progressing Towards Goal  Goal: *Lungs clear or at baseline  Outcome: Progressing Towards Goal     Problem: Unstable angina/NSTEMI: Day 2  Goal: Off Pathway (Use only if patient is Off Pathway)  Outcome: Progressing Towards Goal  Goal: Activity/Safety  Outcome: Progressing Towards Goal  Goal: Consults, if ordered  Outcome: Progressing Towards Goal  Goal: Diagnostic Test/Procedures  Outcome: Progressing Towards Goal  Goal: Nutrition/Diet  Outcome: Progressing Towards Goal  Goal: Discharge Planning  Outcome: Progressing Towards Goal  Goal: Medications  Outcome: Progressing Towards Goal  Goal: Respiratory  Outcome: Progressing Towards Goal  Goal: Treatments/Interventions/Procedures  Outcome: Progressing Towards Goal  Goal: Psychosocial  Outcome: Progressing Towards Goal  Goal: *Hemodynamically stable  Outcome: Progressing Towards Goal  Goal: *Optimal pain control at patient's stated goal  Outcome: Progressing Towards Goal  Goal: *Lungs clear or at baseline  Outcome: Progressing Towards Goal     Problem: Unstable Angina/NSTEMI: Discharge Outcomes  Goal: *Hemodynamically stable  Outcome: Progressing Towards Goal  Goal: *Stable cardiac rhythm  Outcome: Progressing Towards Goal  Goal: *Lungs clear or at baseline  Outcome: Progressing Towards Goal  Goal: *Optimal pain control at patient's stated goal  Outcome: Progressing Towards Goal  Goal: *Identifies cardiac risk factors  Outcome: Progressing Towards Goal  Goal: *Verbalizes home exercise program, activity guidelines, cardiac precautions  Outcome: Progressing Towards Goal  Goal: *Verbalizes understanding and describes prescribed diet  Outcome: Progressing Towards Goal  Goal: *Verbalizes name, dosage, time, side effects, and number of days to continue medications  Outcome: Progressing Towards Goal  Goal: *Anxiety reduced or absent  Outcome: Progressing Towards Goal  Goal: *Understands and describes signs and symptoms to report to providers(Stroke Metric)  Outcome: Progressing Towards Goal  Goal: *Describes follow-up/return visits to physicians  Outcome: Progressing Towards Goal  Goal: *Describes available resources and support systems  Outcome: Progressing Towards Goal  Goal: *Influenza immunization  Outcome: Progressing Towards Goal  Goal: *Pneumococcal immunization  Outcome: Progressing Towards Goal  Goal: *Describes smoking cessation resources  Outcome: Progressing Towards Goal

## 2022-10-23 NOTE — PROGRESS NOTES
Problem: Falls - Risk of  Goal: *Absence of Falls  Description: Document Deanne Marking Fall Risk and appropriate interventions in the flowsheet. Outcome: Progressing Towards Goal  Note: Fall Risk Interventions:                                Problem: Patient Education: Go to Patient Education Activity  Goal: Patient/Family Education  Outcome: Progressing Towards Goal     Problem: Hypertension  Goal: *Blood pressure within specified parameters  Outcome: Progressing Towards Goal  Goal: *Fluid volume balance  Outcome: Progressing Towards Goal  Goal: *Labs within defined limits  Outcome: Progressing Towards Goal     Problem: Patient Education: Go to Patient Education Activity  Goal: Patient/Family Education  Outcome: Progressing Towards Goal     Problem: Diabetes Self-Management  Goal: *Disease process and treatment process  Description: Define diabetes and identify own type of diabetes; list 3 options for treating diabetes. Outcome: Progressing Towards Goal  Goal: *Incorporating nutritional management into lifestyle  Description: Describe effect of type, amount and timing of food on blood glucose; list 3 methods for planning meals. Outcome: Progressing Towards Goal  Goal: *Incorporating physical activity into lifestyle  Description: State effect of exercise on blood glucose levels. Outcome: Progressing Towards Goal  Goal: *Developing strategies to promote health/change behavior  Description: Define the ABC's of diabetes; identify appropriate screenings, schedule and personal plan for screenings. Outcome: Progressing Towards Goal  Goal: *Using medications safely  Description: State effect of diabetes medications on diabetes; name diabetes medication taking, action and side effects. Outcome: Progressing Towards Goal  Goal: *Monitoring blood glucose, interpreting and using results  Description: Identify recommended blood glucose targets  and personal targets.   Outcome: Progressing Towards Goal  Goal: *Prevention, detection, treatment of acute complications  Description: List symptoms of hyper- and hypoglycemia; describe how to treat low blood sugar and actions for lowering  high blood glucose level. Outcome: Progressing Towards Goal  Goal: *Prevention, detection and treatment of chronic complications  Description: Define the natural course of diabetes and describe the relationship of blood glucose levels to long term complications of diabetes.   Outcome: Progressing Towards Goal  Goal: *Developing strategies to address psychosocial issues  Description: Describe feelings about living with diabetes; identify support needed and support network  Outcome: Progressing Towards Goal  Goal: *Insulin pump training  Outcome: Progressing Towards Goal  Goal: *Sick day guidelines  Outcome: Progressing Towards Goal  Goal: *Patient Specific Goal (EDIT GOAL, INSERT TEXT)  Outcome: Progressing Towards Goal     Problem: Patient Education: Go to Patient Education Activity  Goal: Patient/Family Education  Outcome: Progressing Towards Goal     Problem: Patient Education: Go to Patient Education Activity  Goal: Patient/Family Education  Outcome: Progressing Towards Goal     Problem: Unstable angina/NSTEMI: Day of Admission/Day 1  Goal: Off Pathway (Use only if patient is Off Pathway)  Outcome: Progressing Towards Goal  Goal: Activity/Safety  Outcome: Progressing Towards Goal  Goal: Consults, if ordered  Outcome: Progressing Towards Goal  Goal: Diagnostic Test/Procedures  Outcome: Progressing Towards Goal  Goal: Nutrition/Diet  Outcome: Progressing Towards Goal  Goal: Discharge Planning  Outcome: Progressing Towards Goal  Goal: Medications  Outcome: Progressing Towards Goal  Goal: Respiratory  Outcome: Progressing Towards Goal  Goal: Treatments/Interventions/Procedures  Outcome: Progressing Towards Goal  Goal: Psychosocial  Outcome: Progressing Towards Goal  Goal: *Hemodynamically stable  Outcome: Progressing Towards Goal  Goal: *Optimal pain control at patient's stated goal  Outcome: Progressing Towards Goal  Goal: *Lungs clear or at baseline  Outcome: Progressing Towards Goal     Problem: Unstable angina/NSTEMI: Day 2  Goal: Off Pathway (Use only if patient is Off Pathway)  Outcome: Progressing Towards Goal  Goal: Activity/Safety  Outcome: Progressing Towards Goal  Goal: Consults, if ordered  Outcome: Progressing Towards Goal  Goal: Diagnostic Test/Procedures  Outcome: Progressing Towards Goal  Goal: Nutrition/Diet  Outcome: Progressing Towards Goal  Goal: Discharge Planning  Outcome: Progressing Towards Goal  Goal: Medications  Outcome: Progressing Towards Goal  Goal: Respiratory  Outcome: Progressing Towards Goal  Goal: Treatments/Interventions/Procedures  Outcome: Progressing Towards Goal  Goal: Psychosocial  Outcome: Progressing Towards Goal  Goal: *Hemodynamically stable  Outcome: Progressing Towards Goal  Goal: *Optimal pain control at patient's stated goal  Outcome: Progressing Towards Goal  Goal: *Lungs clear or at baseline  Outcome: Progressing Towards Goal     Problem: Unstable Angina/NSTEMI: Discharge Outcomes  Goal: *Hemodynamically stable  Outcome: Progressing Towards Goal  Goal: *Stable cardiac rhythm  Outcome: Progressing Towards Goal  Goal: *Lungs clear or at baseline  Outcome: Progressing Towards Goal  Goal: *Optimal pain control at patient's stated goal  Outcome: Progressing Towards Goal  Goal: *Identifies cardiac risk factors  Outcome: Progressing Towards Goal  Goal: *Verbalizes home exercise program, activity guidelines, cardiac precautions  Outcome: Progressing Towards Goal  Goal: *Verbalizes understanding and describes prescribed diet  Outcome: Progressing Towards Goal  Goal: *Verbalizes name, dosage, time, side effects, and number of days to continue medications  Outcome: Progressing Towards Goal  Goal: *Anxiety reduced or absent  Outcome: Progressing Towards Goal  Goal: *Understands and describes signs and symptoms to report to providers(Stroke Metric)  Outcome: Progressing Towards Goal  Goal: *Describes follow-up/return visits to physicians  Outcome: Progressing Towards Goal  Goal: *Describes available resources and support systems  Outcome: Progressing Towards Goal  Goal: *Influenza immunization  Outcome: Progressing Towards Goal  Goal: *Pneumococcal immunization  Outcome: Progressing Towards Goal  Goal: *Describes smoking cessation resources  Outcome: Progressing Towards Goal

## 2022-10-24 LAB
ANION GAP SERPL CALC-SCNC: 4 MMOL/L (ref 5–15)
BUN SERPL-MCNC: 24 MG/DL (ref 6–20)
BUN/CREAT SERPL: 20 (ref 12–20)
CALCIUM SERPL-MCNC: 9.5 MG/DL (ref 8.5–10.1)
CHLORIDE SERPL-SCNC: 111 MMOL/L (ref 97–108)
CO2 SERPL-SCNC: 28 MMOL/L (ref 21–32)
CREAT SERPL-MCNC: 1.2 MG/DL (ref 0.7–1.3)
ERYTHROCYTE [DISTWIDTH] IN BLOOD BY AUTOMATED COUNT: 15.9 % (ref 11.5–14.5)
GLUCOSE BLD STRIP.AUTO-MCNC: 154 MG/DL (ref 65–117)
GLUCOSE SERPL-MCNC: 135 MG/DL (ref 65–100)
HCT VFR BLD AUTO: 36 % (ref 36.6–50.3)
HGB BLD-MCNC: 11.7 G/DL (ref 12.1–17)
MCH RBC QN AUTO: 30.2 PG (ref 26–34)
MCHC RBC AUTO-ENTMCNC: 32.5 G/DL (ref 30–36.5)
MCV RBC AUTO: 93 FL (ref 80–99)
NRBC # BLD: 0 K/UL (ref 0–0.01)
NRBC BLD-RTO: 0 PER 100 WBC
PLATELET # BLD AUTO: 271 K/UL (ref 150–400)
PMV BLD AUTO: 11.1 FL (ref 8.9–12.9)
POTASSIUM SERPL-SCNC: 3.9 MMOL/L (ref 3.5–5.1)
RBC # BLD AUTO: 3.87 M/UL (ref 4.1–5.7)
SERVICE CMNT-IMP: ABNORMAL
SODIUM SERPL-SCNC: 143 MMOL/L (ref 136–145)
WBC # BLD AUTO: 5 K/UL (ref 4.1–11.1)

## 2022-10-24 PROCEDURE — 65270000029 HC RM PRIVATE

## 2022-10-24 PROCEDURE — 74011000250 HC RX REV CODE- 250: Performed by: INTERNAL MEDICINE

## 2022-10-24 PROCEDURE — 74011250637 HC RX REV CODE- 250/637: Performed by: HOSPITALIST

## 2022-10-24 PROCEDURE — 36415 COLL VENOUS BLD VENIPUNCTURE: CPT

## 2022-10-24 PROCEDURE — 92610 EVALUATE SWALLOWING FUNCTION: CPT

## 2022-10-24 PROCEDURE — 82962 GLUCOSE BLOOD TEST: CPT

## 2022-10-24 PROCEDURE — 74011250637 HC RX REV CODE- 250/637: Performed by: INTERNAL MEDICINE

## 2022-10-24 PROCEDURE — 74011250637 HC RX REV CODE- 250/637: Performed by: FAMILY MEDICINE

## 2022-10-24 PROCEDURE — 85027 COMPLETE CBC AUTOMATED: CPT

## 2022-10-24 PROCEDURE — 80048 BASIC METABOLIC PNL TOTAL CA: CPT

## 2022-10-24 RX ORDER — LORAZEPAM 1 MG/1
0.5 TABLET ORAL
Status: DISCONTINUED | OUTPATIENT
Start: 2022-10-24 | End: 2022-10-26 | Stop reason: HOSPADM

## 2022-10-24 RX ADMIN — SODIUM CHLORIDE, PRESERVATIVE FREE 10 ML: 5 INJECTION INTRAVENOUS at 05:36

## 2022-10-24 RX ADMIN — LORAZEPAM 0.5 MG: 1 TABLET ORAL at 22:37

## 2022-10-24 RX ADMIN — FAMOTIDINE 20 MG: 20 TABLET, FILM COATED ORAL at 09:29

## 2022-10-24 RX ADMIN — LISINOPRIL 40 MG: 20 TABLET ORAL at 09:29

## 2022-10-24 RX ADMIN — AMLODIPINE BESYLATE 10 MG: 5 TABLET ORAL at 09:29

## 2022-10-24 RX ADMIN — ACETAMINOPHEN 650 MG: 325 TABLET, FILM COATED ORAL at 22:38

## 2022-10-24 RX ADMIN — QUETIAPINE FUMARATE 50 MG: 25 TABLET ORAL at 09:29

## 2022-10-24 RX ADMIN — TAMSULOSIN HYDROCHLORIDE 0.4 MG: 0.4 CAPSULE ORAL at 09:30

## 2022-10-24 RX ADMIN — CLONIDINE HYDROCHLORIDE 0.1 MG: 0.1 TABLET ORAL at 22:26

## 2022-10-24 RX ADMIN — QUETIAPINE FUMARATE 50 MG: 25 TABLET ORAL at 17:22

## 2022-10-24 RX ADMIN — SODIUM CHLORIDE, PRESERVATIVE FREE 10 ML: 5 INJECTION INTRAVENOUS at 22:40

## 2022-10-24 RX ADMIN — SODIUM CHLORIDE, PRESERVATIVE FREE 10 ML: 5 INJECTION INTRAVENOUS at 17:23

## 2022-10-24 RX ADMIN — FENOFIBRATE 160 MG: 160 TABLET ORAL at 22:37

## 2022-10-24 RX ADMIN — CARVEDILOL 25 MG: 12.5 TABLET, FILM COATED ORAL at 09:29

## 2022-10-24 RX ADMIN — CARVEDILOL 25 MG: 12.5 TABLET, FILM COATED ORAL at 17:22

## 2022-10-24 RX ADMIN — ATORVASTATIN CALCIUM 20 MG: 20 TABLET, FILM COATED ORAL at 22:27

## 2022-10-24 RX ADMIN — TRAZODONE HYDROCHLORIDE 50 MG: 50 TABLET ORAL at 22:27

## 2022-10-24 RX ADMIN — Medication 2000 UNITS: at 09:29

## 2022-10-24 NOTE — PROGRESS NOTES
SPEECH PATHOLOGY BEDSIDE SWALLOW EVALUATION/DISCHARGE  Patient: Kat Malone (71 y.o. male)  Date: 10/24/2022  Primary Diagnosis: Chest pain [R07.9]  Renal mass [N28.89]       Precautions: aspiration       ASSESSMENT :  Based on the objective data described below, the patient presents with functional oral-pharyngeal swallow. Admitted 10-21-22 with various complaints, including chest pain. Dx with L kidney mass. CXR: negative. 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 PMH; apiration PNA, dysphagia, chr pain, dementia, memory loss,  lumbar fusion, A&D, CKD, DM, XOL, ANA ROSA with CPAP, cervical fusion, cholecystectomy. .  Skilled acute therapy provided by a speech-language pathologist is not indicated at this time. PLAN :  Recommendations:  Ok for Easy to [employer], thin liquids. Consider GI if esophageal mass work up desired. Discharge Recommendations: None     SUBJECTIVE:   Patient stated I want to sit up on the side of the bed.     OBJECTIVE:     Past Medical History:   Diagnosis Date    Chronic kidney disease     Stage 2    Diabetes mellitus with stage 2 chronic kidney disease (HCC)     High cholesterol     Morbid obesity with BMI of 40.0-44.9, adult ([facility] [address]     MRSA infection 2016    Abcess on abdomen    Sleep apnea with use of continuous positive airway pressure (CPAP)     Tinnitus aurium, bilateral 2018     Past Surgical History:   Procedure Laterality Date    HX CERVICAL FUSION N/A 2017    HX CHOLECYSTECTOMY      HX COLONOSCOPY N/A     HX CYST INCISION AND DRAINAGE N/A 2015    Cyst on abdomen    HX LUMBAR FUSION N/A 01/07/2019    L 4-S1 Lami & L4-5 Fusion    HX TONSILLECTOMY N/A      Prior Level of Function/Home Situation: lives alone     Diet prior to admission: regular, thins  Current Diet:  soft and bite sized, thins  Cognitive and Communication Status:  Neurologic State: Alert  Orientation Level: Oriented to person, Oriented to place (he changes reason for admission frequently)  Cognition: Follows commands, Appropriate for age attention/concentration, Memory loss     Perseveration: No perseveration noted  Safety/Judgement: Decreased insight into deficits  Oral Assessment:  Oral Assessment  Labial: No impairment  Dentition: Natural  Oral Hygiene: WFL  Velum: No impairment  Mandible: No impairment  P.O. Trials:  Patient Position: upright in bed  Vocal quality prior to P.O.: No impairment  Consistency Presented: Solid; Thin liquid (seen at lunch)   ORAL PHASE:      Bolus Acceptance: No impairment  Bolus Formation/Control: No impairment     Propulsion: No impairment  Oral Residue: None  PHARYNGEAL PHASE:   Initiation of Swallow: No impairment  Laryngeal Elevation: Functional  Aspiration Signs/Symptoms: None            Cul-de-sac resonanance noted in voice with eating. NOMS:   The NOMS functional outcome measure was used to quantify this patient's level of swallowing impairment. Based on the NOMS, the patient was determined to be at level 7 for swallow function     NOMS Swallowing Levels:  Level 1 (CN): NPO  Level 2 (CM): NPO but takes consistency in therapy  Level 3 (CL): Takes less than 50% of nutrition p.o. and continues with nonoral feedings; and/or safe with mod cues; and/or max diet restriction  Level 4 (CK): Safe swallow but needs mod cues; and/or mod diet restriction; and/or still requires some nonoral feeding/supplements  Level 5 (CJ): Safe swallow with min diet restriction; and/or needs min cues  Level 6 (CI): Independent with p.o.; rare cues; usually self cues; may need to avoid some foods or needs extra time  Level 7 ([address] [place] [address]): Independent for all p.o.  CAROLINE. (2003). National Outcomes Measurement System (NOMS): Adult Speech-Language Pathology User's Guide.        Pain:  Pain Scale 1: FLACC  Pain Intensity 1: 0     After treatment:   Patient left in no apparent distress in bed, Nursing notified, and Caregiver / family present    COMMUNICATION/EDUCATION:   Patient was educated regarding his deficit(s) of none as this relates to his diagnosis of chest pain. He demonstrated Fair understanding as evidenced by inconsistent memory. The patient's plan of care including recommendations, planned interventions, and recommended diet changes were discussed with: Registered nurse.      Thank you for this referral.  Jewel Mccray, SLP  Time Calculation: 15 mins

## 2022-10-24 NOTE — PROGRESS NOTES
Problem: Falls - Risk of  Goal: *Absence of Falls  Description: Document Louie Fall Risk and appropriate interventions in the flowsheet. Outcome: Progressing Towards Goal  Note: Fall Risk Interventions:  Mobility Interventions: Bed/chair exit alarm, Communicate number of staff needed for ambulation/transfer, PT Consult for mobility concerns, Patient to call before getting OOB    Mentation Interventions: Bed/chair exit alarm, Gait belt with transfers/ambulation, Reorient patient    Medication Interventions: Assess postural VS orthostatic hypotension, Patient to call before getting OOB, Teach patient to arise slowly    Elimination Interventions: Patient to call for help with toileting needs, Call light in reach, Bed/chair exit alarm    History of Falls Interventions: Bed/chair exit alarm, Investigate reason for fall, Room close to nurse's station         Problem: Patient Education: Go to Patient Education Activity  Goal: Patient/Family Education  Outcome: Progressing Towards Goal     Problem: Hypertension  Goal: *Blood pressure within specified parameters  Outcome: Progressing Towards Goal  Goal: *Fluid volume balance  Outcome: Progressing Towards Goal  Goal: *Labs within defined limits  Outcome: Progressing Towards Goal     Problem: Patient Education: Go to Patient Education Activity  Goal: Patient/Family Education  Outcome: Progressing Towards Goal     Problem: Diabetes Self-Management  Goal: *Disease process and treatment process  Description: Define diabetes and identify own type of diabetes; list 3 options for treating diabetes. Outcome: Progressing Towards Goal  Goal: *Incorporating nutritional management into lifestyle  Description: Describe effect of type, amount and timing of food on blood glucose; list 3 methods for planning meals. Outcome: Progressing Towards Goal  Goal: *Incorporating physical activity into lifestyle  Description: State effect of exercise on blood glucose levels.   Outcome: Progressing Towards Goal  Goal: *Developing strategies to promote health/change behavior  Description: Define the ABC's of diabetes; identify appropriate screenings, schedule and personal plan for screenings. Outcome: Progressing Towards Goal  Goal: *Using medications safely  Description: State effect of diabetes medications on diabetes; name diabetes medication taking, action and side effects. Outcome: Progressing Towards Goal  Goal: *Monitoring blood glucose, interpreting and using results  Description: Identify recommended blood glucose targets  and personal targets. Outcome: Progressing Towards Goal  Goal: *Prevention, detection, treatment of acute complications  Description: List symptoms of hyper- and hypoglycemia; describe how to treat low blood sugar and actions for lowering  high blood glucose level. Outcome: Progressing Towards Goal  Goal: *Prevention, detection and treatment of chronic complications  Description: Define the natural course of diabetes and describe the relationship of blood glucose levels to long term complications of diabetes.   Outcome: Progressing Towards Goal  Goal: *Developing strategies to address psychosocial issues  Description: Describe feelings about living with diabetes; identify support needed and support network  Outcome: Progressing Towards Goal  Goal: *Insulin pump training  Outcome: Progressing Towards Goal  Goal: *Sick day guidelines  Outcome: Progressing Towards Goal  Goal: *Patient Specific Goal (EDIT GOAL, INSERT TEXT)  Outcome: Progressing Towards Goal     Problem: Patient Education: Go to Patient Education Activity  Goal: Patient/Family Education  Outcome: Progressing Towards Goal     Problem: Patient Education: Go to Patient Education Activity  Goal: Patient/Family Education  Outcome: Progressing Towards Goal     Problem: Unstable angina/NSTEMI: Day of Admission/Day 1  Goal: Off Pathway (Use only if patient is Off Pathway)  Outcome: Progressing Towards Goal  Goal: Activity/Safety  Outcome: Progressing Towards Goal  Goal: Consults, if ordered  Outcome: Progressing Towards Goal  Goal: Diagnostic Test/Procedures  Outcome: Progressing Towards Goal  Goal: Nutrition/Diet  Outcome: Progressing Towards Goal  Goal: Discharge Planning  Outcome: Progressing Towards Goal  Goal: Medications  Outcome: Progressing Towards Goal  Goal: Respiratory  Outcome: Progressing Towards Goal  Goal: Treatments/Interventions/Procedures  Outcome: Progressing Towards Goal  Goal: Psychosocial  Outcome: Progressing Towards Goal  Goal: *Hemodynamically stable  Outcome: Progressing Towards Goal  Goal: *Optimal pain control at patient's stated goal  Outcome: Progressing Towards Goal  Goal: *Lungs clear or at baseline  Outcome: Progressing Towards Goal     Problem: Unstable angina/NSTEMI: Day 2  Goal: Off Pathway (Use only if patient is Off Pathway)  Outcome: Progressing Towards Goal  Goal: Activity/Safety  Outcome: Progressing Towards Goal  Goal: Consults, if ordered  Outcome: Progressing Towards Goal  Goal: Diagnostic Test/Procedures  Outcome: Progressing Towards Goal  Goal: Nutrition/Diet  Outcome: Progressing Towards Goal  Goal: Discharge Planning  Outcome: Progressing Towards Goal  Goal: Medications  Outcome: Progressing Towards Goal  Goal: Respiratory  Outcome: Progressing Towards Goal  Goal: Treatments/Interventions/Procedures  Outcome: Progressing Towards Goal  Goal: Psychosocial  Outcome: Progressing Towards Goal  Goal: *Hemodynamically stable  Outcome: Progressing Towards Goal  Goal: *Optimal pain control at patient's stated goal  Outcome: Progressing Towards Goal  Goal: *Lungs clear or at baseline  Outcome: Progressing Towards Goal     Problem: Unstable Angina/NSTEMI: Discharge Outcomes  Goal: *Hemodynamically stable  Outcome: Progressing Towards Goal  Goal: *Stable cardiac rhythm  Outcome: Progressing Towards Goal  Goal: *Lungs clear or at baseline  Outcome: Progressing Towards Goal  Goal: *Optimal pain control at patient's stated goal  Outcome: Progressing Towards Goal  Goal: *Identifies cardiac risk factors  Outcome: Progressing Towards Goal  Goal: *Verbalizes home exercise program, activity guidelines, cardiac precautions  Outcome: Progressing Towards Goal  Goal: *Verbalizes understanding and describes prescribed diet  Outcome: Progressing Towards Goal  Goal: *Verbalizes name, dosage, time, side effects, and number of days to continue medications  Outcome: Progressing Towards Goal  Goal: *Anxiety reduced or absent  Outcome: Progressing Towards Goal  Goal: *Understands and describes signs and symptoms to report to providers(Stroke Metric)  Outcome: Progressing Towards Goal  Goal: *Describes follow-up/return visits to physicians  Outcome: Progressing Towards Goal  Goal: *Describes available resources and support systems  Outcome: Progressing Towards Goal  Goal: *Influenza immunization  Outcome: Progressing Towards Goal  Goal: *Pneumococcal immunization  Outcome: Progressing Towards Goal  Goal: *Describes smoking cessation resources  Outcome: Progressing Towards Goal

## 2022-10-24 NOTE — PROGRESS NOTES
10/24/2022  5:54 PM  Care Management Assessment      Reason for Admission: Emergency - Palliative consulted. ICD-10-CM ICD-9-CM    1. Renal mass  N28.89 593.9       2. Chest pain, unspecified type  R07.9 786.50       3. Retroperitoneal hemorrhage  R58 459.0           Assessment:   []In person with pt   []Via p/c with pt   []With family member in person. Who/Relation:     [x]With family member via p/c. Who/Relation: Darlene Chan DTR/REYNALDO  []Chart Review    RUR: 10%  Risk Level: [x]Low []Moderate []High  Value-based purchasing: [] Yes [x] No    Advance Directive: Full Code.    [] No AD on file. [x] AD on file. [] Current AD not on file. Copy requested. [] Requests AD, and referral submitted to [facility]. Healthcare Decision Maker: Darlene Chan DTR/REYNALDO        Assessment:    Age: 78 y.o. Sex: [x] Male []Female     Residency: [x]Private residence []Apartment []Assisted Living []LTC []Other:   Exterior Steps: 2  Interior Steps: 0    Lives With: []With spouse [x]Other family members ([facility] [address] []Underage children []Alone []Care provider []Other:    Prior functioning:  [x]Independent with ADLs and iADLS (should use a RW more often but doesn't) []Dependent with ADLs and iADLs []Partial dependence, Specify:     Cognition: []Intact [x]Some spheres some of the time. []Some spheres all of the time. []All spheres all of the time.      Prior transportation method: []Self []Spouse [x]Other family members (DTR) []Medicaid transport []Other:     Prior DME required:  []None [x]RW (should use more often but pt is stubborn per DTR) []Cane []Crutches []Bedside commode []CPAP []Home O2 (Liter/Provider: ) []Nebulizer   []Shower Chair []Wheelchair []Hospital Bed []Payal []Stair lift []Rollator []Other:    Rehab history: []None []Outpatient PT [x]Home Health (Provider/Date: At [address] - current) []SNF (Provider/Date: ) [x]IPR (Provider/Date: Paresh Munoz) []LTC (Provider/Date: ) []Hospice (Provider/Date: )  []Other:     Covid vaccination status:   [] Yes, Type:  [] Booster 1 [] Booster 2  [] No  [x] N/A / Not asked    Insurer:   Insurance Information                  [place] [address] [employer] Phone: [phone]    Subscriber: Linwood Hummel Subscriber#: [id number]    Group#: -- Precert#: --        BLUE CROSS/[place] [employer] Phone: --    SubscriberRobb Flash Subscriber#: [id number]    Group#: [id number] Precert#: --        /BSHSI  FOR LIFE Phone: --    SubscriberRobb Flash Subscriber#: [id number]    Group#: -- Precert#: --            PCP: oJey Pyle. Address: [facility] [address]   Phone number: [phone]   Current patient: [x]Yes []No   Approximate date of last visit: 07/2022   Access to virtual PCP visits: []Yes [x]No     Financial concerns/barriers: []Yes, explain: [x]No []Unknown/Not discussed    Pharmacy: [facility] - [facility] Transport: Family     Discharge Concerns: []Yes [x]No []Unknown   Describe:    Comments:           Transition of care plan:    []Unable to determine at this time. Awaiting clinical progress, and disposition recommendations. [] Home with family assistance as needed, and outpatient follow-up. [] Home with Outpatient PT and outpatient follow-up   Pt aware of OP appt? []Yes, Provider:   []Not scheduled   Transport provider:     [] Home with Home Health   - Provider:     []SNF/IPR   -[]Preferences given:   []Listing provided and preferences requested   -Status: []Pending []Accepted:    -Auth required: []Yes []No    -Auth initiated date:   -3 midnight stay required: []Yes []No  Date satisfied:     [] LTC:     [] Home with Hospice   -Provider:     [] [facility] information provided.      [x] Other: Pt's DTR requested via p/c that referral be submitted to [facility] so pt can enroll in their palliative program. Pt's PCP is head of this program. Pt's DTR reported they are not interested in hospice at this time. CM submitted referral via [employer], and is awaiting response. Yudith Rios MA    Care Management Interventions  PCP Verified by CM: Yes (Elma Agustin)  Mode of Transport at Discharge:  Other (see comment)  MyChart Signup: No  Discharge Durable Medical Equipment: No  Physical Therapy Consult: No  Occupational Therapy Consult: No  Speech Therapy Consult: No  Support Systems: Child(owen)  Confirm Follow Up Transport: Family  The Plan for Transition of Care is Related to the Following Treatment Goals : Chest pain / renal mass  The Patient and/or Patient Representative was Provided with a Choice of Provider and Agrees with the Discharge Plan?: (S) Yes  Name of the Patient Representative Who was Provided with a Choice of Provider and Agrees with the Discharge Plan: Self  Freedom of Choice List was Provided with Basic Dialogue that Supports the Patient's Individualized Plan of Care/Goals, Treatment Preferences and Shares the Quality Data Associated with the Providers?: (S) Yes  Discharge Location  Patient Expects to be Discharged to[de-identified] Home with outpatient services

## 2022-10-24 NOTE — PROGRESS NOTES
Hospitalist Progress Note                               Castillo Rivas MD                                     Answering service: [phone]                               OR [phone] from in house phone                                         Date of Service:  10/24/2022  NAME:  Kaylen Napoles  :  1945  MRN:  [id number]      Admission Summary:   The patient is a 51-year-old  male who is admitted directly to this hospital.  I have spoken with his daughter who has given most of the history. The patient has multiple medical problems including diabetes, hyperlipidemia, anxiety, depression, dysphagia, chronic pain, memory loss, dementia, hypertension, and aspiration pneumonia. The patient was mainly sent to the hospital because of back pain which started a few days ago. The patient has no falls. The patient has poorly-controlled high blood pressure. He denies having any chest pain or shortness of breath. He was evaluated in the emergency room. CT scan was done, which showed no acute finding. However, it showed a 2.1 x 1.5 cm mass in the left kidney with a small amount of left retroperitoneal hemorrhage within the perirenal and pararenal space. It was decided to admit the patient for further evaluation. The patient is a poor historian. According to the daughter, he was living alone. She called the Adult Protective Services as he was not taking care of himself. Recently also had some problem with aspiration pneumonia and failed barium study. He was seen by ENT. A CT of the neck was done by ENT which was unremarkable  The patient denies having any problem with urination. He has no chest pain    Reason for follow up:    No complaints, but patient is drowsy. Discussed with daughter and family at bedside. Assessment & Plan:     1. Back pain. Imaging is suggestive of left kidney exophytic mass. MRI showed 1.  1.8 x 1.6 x 1.7 cm enhancing mass at the inferior pole of the LEFT kidney  suspicious for RCC unless proven otherwise. Persistent, mild LEFT perinephric stranding. No new or increased fluid  collection is seen. No abdominal adenopathy is seen. Urology help appreciated will need out patient follow up. 2.  Hold aspirin. Hemoglobin slightly lower monitor     3. History of neurocognitive impairment, has been seen by Neuropsych. We will monitor closely. Daughter would like to speak with palliative care. 4.  History of hypertension. We will continue current regimen. 5.  History of chronic kidney disease stage III. stable, cont to monitor. 6.  History of aspiration. SLP evaluated. The patient's daughter says that they probably will agree to do Palliative Care consult as the patient is not cooperative. 7.  Small Retroperitoneal hemorrhage stable will monitor if bigger may need GS to evaluate. Code status: Full  DVT prophylaxis: SCD  Care Plan discussed with: Patient's daughter  Patient has given Verbal permission to discuss medical care with   persons present in the room and and also with contact as listed on face sheet. Hospital Problems  Never Reviewed            Codes Class Noted POA    Renal mass ICD-10-CM: N28.89  ICD-9-CM: 593.9  10/23/2022 Unknown        Chest pain ICD-10-CM: R07.9  ICD-9-CM: 786.50  10/21/2022 Unknown           Review of Systems:   A comprehensive review of systems was negative except for that written in the HPI. Physical Examination:      Last 24hrs VS reviewed since prior progress note. Most recent are:  Visit Vitals  BP (!) 149/87 (BP 1 Location: Right upper arm, BP Patient Position: At rest)   Pulse (!) 55   Temp 97.6 °F (36.4 °C)   Resp 18   Ht 5' 6\" (1.676 m)   Wt 99.8 kg (220 lb)   SpO2 94%   BMI 35.51 kg/m²           Constitutional:  No acute distress, cooperative, pleasant    HEENT: Head is a traumatic,  Un icteric sclera. Pink conjunctiva,no erythema or discharge. Oral mucous moist, oropharynx benign. Neck supple,    Resp:  CTA bilaterally. No wheezing/rhonchi/rales. No accessory muscle use   CV:  Regular rhythm, normal rate, no murmurs, gallops, rubs    GI:  Soft, non distended, non tender. normoactive bowel sounds, no hepatosplenomegaly    :  No CVA or suprapubic tenderness   Skin  :  No erythema,rash,bullae,dipigmentation     Musculoskeletal:  No edema, warm, 2+ pulses throughout slight tenderness in back     Neurologic:  AAOx3, CN II-XII reviewed. Moves all extremities. Skin:  Good turgor, no rashes or ulcers       Intake/Output Summary (Last 24 hours) at 10/24/2022 1515  Last data filed at 10/24/2022 0849  Gross per 24 hour   Intake 280 ml   Output --   Net 280 ml            Data Review:    Review and/or order of clinical lab test      Labs:     Recent Labs     10/24/22  0458 10/23/22  0005   WBC 5.0 6.3   HGB 11.7* 10.8*   HCT 36.0* 33.0*    240       Recent Labs     10/24/22  0458 10/23/22  0005    142   K 3.9 3.8   * 110*   CO2 28 26   BUN 24* 25*   CREA 1.20 1.16   * 148*   CA 9.5 8.8       No results for input(s): ALT, AP, TBIL, TBILI, TP, ALB, GLOB, GGT, AML, LPSE in the last 72 hours. No lab exists for component: SGOT, GPT, AMYP, HLPSE    No results for input(s): INR, PTP, APTT, INREXT, INREXT in the last 72 hours. No results for input(s): FE, TIBC, PSAT, FERR in the last 72 hours. No results found for: FOL, RBCF   No results for input(s): PH, PCO2, PO2 in the last 72 hours. No results for input(s): CPK, CKNDX, TROIQ in the last 72 hours.     No lab exists for component: CPKMB  No results found for: CHOL, CHOLX, CHLST, CHOLV, HDL, HDLP, LDL, LDLC, DLDLP, TGLX, TRIGL, TRIGP, CHHD, CHHDX  Lab Results   Component Value Date/Time    Glucose (POC) 104 (H) 10/21/2022 01:01 PM    Glucose (POC) 112 (H) 01/11/2019 04:16 PM    Glucose (POC) 116 (H) 01/11/2019 11:10 AM    Glucose (POC) 148 (H) 01/11/2019 06:49 AM    Glucose (POC) 187 (H) 01/10/2019 09:45 PM    Glucose (POC) 147 (H) 01/10/2019 04:33 PM     Lab Results   Component Value Date/Time    Color YELLOW/STRAW 10/21/2022 12:07 PM    Appearance CLEAR 10/21/2022 12:07 PM    Specific gravity 1.010 10/21/2022 12:07 PM    Specific gravity 1.011 01/11/2019 02:57 PM    pH (UA) 6.5 10/21/2022 12:07 PM    Protein TRACE (A) 10/21/2022 12:07 PM    Glucose Negative 10/21/2022 12:07 PM    Ketone Negative 10/21/2022 12:07 PM    Bilirubin Negative 10/21/2022 12:07 PM    Urobilinogen 0.2 10/21/2022 12:07 PM    Nitrites Negative 10/21/2022 12:07 PM    Leukocyte Esterase Negative 10/21/2022 12:07 PM    Epithelial cells FEW 10/21/2022 12:07 PM    Bacteria Negative 10/21/2022 12:07 PM    WBC 0-4 10/21/2022 12:07 PM    RBC 0-5 10/21/2022 12:07 PM         Medications Reviewed:     Current Facility-Administered Medications   Medication Dose Route Frequency    fentaNYL (DURAGESIC) 12 mcg/hr patch 1 Patch  1 Patch TransDERmal Q72H    oxyCODONE-acetaminophen (PERCOCET 7.5) 7.5-325 mg per tablet 1 Tablet  1 Tablet Oral Q6H PRN    atorvastatin (LIPITOR) tablet 20 mg  20 mg Oral QHS    carvediloL (COREG) tablet 25 mg  25 mg Oral BID WITH MEALS    cholecalciferol (VITAMIN D3) (1000 Units /25 mcg) tablet 2,000 Units  2,000 Units Oral DAILY WITH BREAKFAST    fenofibrate (LOFIBRA) tablet 160 mg  160 mg Oral QHS    cloNIDine HCL (CATAPRES) tablet 0.1 mg  0.1 mg Oral QHS    traZODone (DESYREL) tablet 50 mg  50 mg Oral QHS    QUEtiapine (SEROquel) tablet 50 mg  50 mg Oral BID    tamsulosin (FLOMAX) capsule 0.4 mg  0.4 mg Oral DAILY    famotidine (PEPCID) tablet 20 mg  20 mg Oral DAILY    [Held by provider] aspirin chewable tablet 81 mg  81 mg Oral DAILY    amLODIPine (NORVASC) tablet 10 mg  10 mg Oral DAILY    And    lisinopriL (PRINIVIL, ZESTRIL) tablet 40 mg  40 mg Oral DAILY    sodium chloride (NS) flush 5-40 mL  5-40 mL IntraVENous Q8H    sodium chloride (NS) flush 5-40 mL  5-40 mL IntraVENous PRN acetaminophen (TYLENOL) tablet 650 mg  650 mg Oral Q6H PRN    Or    acetaminophen (TYLENOL) suppository 650 mg  650 mg Rectal Q6H PRN    polyethylene glycol (MIRALAX) packet 17 g  17 g Oral DAILY PRN    ondansetron (ZOFRAN ODT) tablet 4 mg  4 mg Oral Q8H PRN    Or    ondansetron (ZOFRAN) injection 4 mg  4 mg IntraVENous Q6H PRN     ______________________________________________________________________  EXPECTED LENGTH OF STAY: 4d 7h  ACTUAL LENGTH OF STAY:          1                 Denise Joe MD

## 2022-10-25 LAB
ANION GAP SERPL CALC-SCNC: 5 MMOL/L (ref 5–15)
BASOPHILS # BLD: 0.1 K/UL (ref 0–0.1)
BASOPHILS NFR BLD: 1 % (ref 0–1)
BUN SERPL-MCNC: 27 MG/DL (ref 6–20)
BUN/CREAT SERPL: 22 (ref 12–20)
CALCIUM SERPL-MCNC: 8.6 MG/DL (ref 8.5–10.1)
CHLORIDE SERPL-SCNC: 108 MMOL/L (ref 97–108)
CO2 SERPL-SCNC: 27 MMOL/L (ref 21–32)
CREAT SERPL-MCNC: 1.25 MG/DL (ref 0.7–1.3)
DIFFERENTIAL METHOD BLD: ABNORMAL
EOSINOPHIL # BLD: 0.3 K/UL (ref 0–0.4)
EOSINOPHIL NFR BLD: 6 % (ref 0–7)
ERYTHROCYTE [DISTWIDTH] IN BLOOD BY AUTOMATED COUNT: 15.9 % (ref 11.5–14.5)
GLUCOSE SERPL-MCNC: 153 MG/DL (ref 65–100)
HCT VFR BLD AUTO: 33.6 % (ref 36.6–50.3)
HGB BLD-MCNC: 10.7 G/DL (ref 12.1–17)
IMM GRANULOCYTES # BLD AUTO: 0 K/UL (ref 0–0.04)
IMM GRANULOCYTES NFR BLD AUTO: 1 % (ref 0–0.5)
LYMPHOCYTES # BLD: 1.5 K/UL (ref 0.8–3.5)
LYMPHOCYTES NFR BLD: 32 % (ref 12–49)
MCH RBC QN AUTO: 29.8 PG (ref 26–34)
MCHC RBC AUTO-ENTMCNC: 31.8 G/DL (ref 30–36.5)
MCV RBC AUTO: 93.6 FL (ref 80–99)
MONOCYTES # BLD: 0.5 K/UL (ref 0–1)
MONOCYTES NFR BLD: 11 % (ref 5–13)
NEUTS SEG # BLD: 2.3 K/UL (ref 1.8–8)
NEUTS SEG NFR BLD: 49 % (ref 32–75)
NRBC # BLD: 0 K/UL (ref 0–0.01)
NRBC BLD-RTO: 0 PER 100 WBC
PLATELET # BLD AUTO: 248 K/UL (ref 150–400)
PMV BLD AUTO: 11.2 FL (ref 8.9–12.9)
POTASSIUM SERPL-SCNC: 3.7 MMOL/L (ref 3.5–5.1)
RBC # BLD AUTO: 3.59 M/UL (ref 4.1–5.7)
SODIUM SERPL-SCNC: 140 MMOL/L (ref 136–145)
WBC # BLD AUTO: 4.7 K/UL (ref 4.1–11.1)

## 2022-10-25 PROCEDURE — 99223 1ST HOSP IP/OBS HIGH 75: CPT | Performed by: INTERNAL MEDICINE

## 2022-10-25 PROCEDURE — 74011250637 HC RX REV CODE- 250/637: Performed by: FAMILY MEDICINE

## 2022-10-25 PROCEDURE — 36415 COLL VENOUS BLD VENIPUNCTURE: CPT

## 2022-10-25 PROCEDURE — 80048 BASIC METABOLIC PNL TOTAL CA: CPT

## 2022-10-25 PROCEDURE — 74011000250 HC RX REV CODE- 250: Performed by: INTERNAL MEDICINE

## 2022-10-25 PROCEDURE — 85025 COMPLETE CBC W/AUTO DIFF WBC: CPT

## 2022-10-25 PROCEDURE — 65270000029 HC RM PRIVATE

## 2022-10-25 PROCEDURE — 74011250637 HC RX REV CODE- 250/637: Performed by: HOSPITALIST

## 2022-10-25 RX ORDER — FENTANYL 25 UG/1
1 PATCH TRANSDERMAL
Status: DISCONTINUED | OUTPATIENT
Start: 2022-10-25 | End: 2022-10-26 | Stop reason: HOSPADM

## 2022-10-25 RX ORDER — ACETAMINOPHEN 325 MG/1
650 TABLET ORAL EVERY 6 HOURS
Status: DISCONTINUED | OUTPATIENT
Start: 2022-10-25 | End: 2022-10-26 | Stop reason: HOSPADM

## 2022-10-25 RX ADMIN — OXYCODONE AND ACETAMINOPHEN 1 TABLET: 7.5; 325 TABLET ORAL at 13:22

## 2022-10-25 RX ADMIN — Medication 2000 UNITS: at 08:54

## 2022-10-25 RX ADMIN — CARVEDILOL 25 MG: 12.5 TABLET, FILM COATED ORAL at 18:07

## 2022-10-25 RX ADMIN — SODIUM CHLORIDE, PRESERVATIVE FREE 10 ML: 5 INJECTION INTRAVENOUS at 16:25

## 2022-10-25 RX ADMIN — ACETAMINOPHEN 650 MG: 325 TABLET, FILM COATED ORAL at 18:07

## 2022-10-25 RX ADMIN — OXYCODONE AND ACETAMINOPHEN 1 TABLET: 7.5; 325 TABLET ORAL at 22:08

## 2022-10-25 RX ADMIN — QUETIAPINE FUMARATE 50 MG: 25 TABLET ORAL at 08:54

## 2022-10-25 RX ADMIN — SODIUM CHLORIDE, PRESERVATIVE FREE 10 ML: 5 INJECTION INTRAVENOUS at 07:21

## 2022-10-25 RX ADMIN — CARVEDILOL 25 MG: 12.5 TABLET, FILM COATED ORAL at 09:03

## 2022-10-25 RX ADMIN — SODIUM CHLORIDE, PRESERVATIVE FREE 10 ML: 5 INJECTION INTRAVENOUS at 22:00

## 2022-10-25 RX ADMIN — ATORVASTATIN CALCIUM 20 MG: 20 TABLET, FILM COATED ORAL at 22:09

## 2022-10-25 RX ADMIN — QUETIAPINE FUMARATE 50 MG: 25 TABLET ORAL at 18:07

## 2022-10-25 RX ADMIN — FENOFIBRATE 160 MG: 160 TABLET ORAL at 22:13

## 2022-10-25 RX ADMIN — TAMSULOSIN HYDROCHLORIDE 0.4 MG: 0.4 CAPSULE ORAL at 08:54

## 2022-10-25 RX ADMIN — LORAZEPAM 0.5 MG: 1 TABLET ORAL at 22:08

## 2022-10-25 RX ADMIN — TRAZODONE HYDROCHLORIDE 50 MG: 50 TABLET ORAL at 22:09

## 2022-10-25 RX ADMIN — LISINOPRIL 40 MG: 20 TABLET ORAL at 09:03

## 2022-10-25 RX ADMIN — OXYCODONE AND ACETAMINOPHEN 1 TABLET: 7.5; 325 TABLET ORAL at 01:07

## 2022-10-25 RX ADMIN — AMLODIPINE BESYLATE 10 MG: 5 TABLET ORAL at 09:03

## 2022-10-25 RX ADMIN — CLONIDINE HYDROCHLORIDE 0.1 MG: 0.1 TABLET ORAL at 22:09

## 2022-10-25 RX ADMIN — FAMOTIDINE 20 MG: 20 TABLET, FILM COATED ORAL at 08:54

## 2022-10-25 NOTE — PROGRESS NOTES
Spiritual Care Assessment/Progress Note  [address]      NAME: Brain Manzanares      MRN: [id number]  AGE: 78 y.o.  SEX: male  Holiness Affiliation: Gnosticist   Language: English     10/25/2022     Total Time (in minutes): (P) 60     Spiritual Assessment begun in [facility] POST SURG ORT 1 through conversation with:         [x]Patient        [] Family    [] Friend(s)        Reason for Consult: (P) Request by staff     Spiritual beliefs: (Please include comment if needed)     [x] Identifies with a hina tradition:    Gnosticism     [x] Supported by a hina community:       [place]     [] Claims no spiritual orientation:           [] Seeking spiritual identity:                [] Adheres to an individual form of spirituality:           [] Not able to assess:                           Identified resources for coping:      [] Prayer                               [] Music                  [] Guided Imagery     [x] Family/friends                 [] Pet visits     [] Devotional reading                         [] Unknown     [] Other: hina in God                                              Interventions offered during this visit: (See comments for more details)    Patient Interventions: (P) Affirmation of hina, Catharsis/review of pertinent events in supportive environment, Affirmation of emotions/emotional suffering, Coping skills reviewed/reinforced, End of life issues discussed, Guidance concerning next steps/process to be expected, Iconic (affirming the presence of God/Higher Power), Initial/Spiritual assessment, patient floor, Life review/legacy, Issues around forgiveness/closure, Normalization of emotional/spiritual concerns, Prayer (assurance of), Holiness beliefs/image of God discussed           Plan of Care:     [] Support spiritual and/or cultural needs    [] Support AMD and/or advance care planning process      [] Support grieving process   [] Coordinate Rites and/or Rituals    [] Coordination with community clergy   [] No spiritual needs identified at this time   [] Detailed Plan of Care below (See Comments)  [] Make referral to Music Therapy  [] Make referral to Pet Therapy     [] Make referral to Addiction services  [] Make referral to [facility]  [] Make referral to Spiritual Care Partner  [] No future visits requested        [x] Contact Spiritual Care for further referrals     Comments: Spiritual Care consult by , Omid Donohue on 4. Reviewed maría and spoke with pt's nurse. Patient alone was present during the visit. Patient shared about image of God, finding comfort in his hina community, his family and friends who bring him brandi. Patient shared he wished he had made some different choices in life. Pt shared, although he is 78 and has had a good life, he is concerned about death and hoped it wouldn't come too soon. Assessed coping. Listened attentively as pt engaged in storytelling. Offered a compassionate presence. Reflected value statements. Offered assurance of prayer. Advised of  availability. Advised nurse to contact [facility] for any further referrals. Thank you for your consult.   Latesha. 80, Rakesh Montes 89, Ru [address], [facility]  Staff   Paging service: [phone] (PRARASHMI)

## 2022-10-25 NOTE — PROGRESS NOTES
Problem: Falls - Risk of  Goal: *Absence of Falls  Description: Document Maria D Dow Fall Risk and appropriate interventions in the flowsheet. Outcome: Progressing Towards Goal  Note: Fall Risk Interventions:  Mobility Interventions: Bed/chair exit alarm, Communicate number of staff needed for ambulation/transfer, Patient to call before getting OOB, Utilize walker, cane, or other assistive device    Mentation Interventions: Adequate sleep, hydration, pain control, Bed/chair exit alarm    Medication Interventions: Patient to call before getting OOB, Teach patient to arise slowly, Utilize gait belt for transfers/ambulation, Bed/chair exit alarm    Elimination Interventions: Bed/chair exit alarm, Call light in reach, Patient to call for help with toileting needs    History of Falls Interventions: Bed/chair exit alarm, Investigate reason for fall, Room close to nurse's station, Utilize gait belt for transfer/ambulation         Problem: Patient Education: Go to Patient Education Activity  Goal: Patient/Family Education  Outcome: Progressing Towards Goal     Problem: Hypertension  Goal: *Blood pressure within specified parameters  Outcome: Progressing Towards Goal  Goal: *Fluid volume balance  Outcome: Progressing Towards Goal  Goal: *Labs within defined limits  Outcome: Progressing Towards Goal     Problem: Patient Education: Go to Patient Education Activity  Goal: Patient/Family Education  Outcome: Progressing Towards Goal     Problem: Diabetes Self-Management  Goal: *Disease process and treatment process  Description: Define diabetes and identify own type of diabetes; list 3 options for treating diabetes. Outcome: Progressing Towards Goal  Goal: *Incorporating nutritional management into lifestyle  Description: Describe effect of type, amount and timing of food on blood glucose; list 3 methods for planning meals.   Outcome: Progressing Towards Goal  Goal: *Incorporating physical activity into lifestyle  Description: State effect of exercise on blood glucose levels. Outcome: Progressing Towards Goal  Goal: *Developing strategies to promote health/change behavior  Description: Define the ABC's of diabetes; identify appropriate screenings, schedule and personal plan for screenings. Outcome: Progressing Towards Goal  Goal: *Using medications safely  Description: State effect of diabetes medications on diabetes; name diabetes medication taking, action and side effects. Outcome: Progressing Towards Goal  Goal: *Monitoring blood glucose, interpreting and using results  Description: Identify recommended blood glucose targets  and personal targets. Outcome: Progressing Towards Goal  Goal: *Prevention, detection, treatment of acute complications  Description: List symptoms of hyper- and hypoglycemia; describe how to treat low blood sugar and actions for lowering  high blood glucose level. Outcome: Progressing Towards Goal  Goal: *Prevention, detection and treatment of chronic complications  Description: Define the natural course of diabetes and describe the relationship of blood glucose levels to long term complications of diabetes.   Outcome: Progressing Towards Goal  Goal: *Developing strategies to address psychosocial issues  Description: Describe feelings about living with diabetes; identify support needed and support network  Outcome: Progressing Towards Goal  Goal: *Insulin pump training  Outcome: Progressing Towards Goal  Goal: *Sick day guidelines  Outcome: Progressing Towards Goal  Goal: *Patient Specific Goal (EDIT GOAL, INSERT TEXT)  Outcome: Progressing Towards Goal     Problem: Patient Education: Go to Patient Education Activity  Goal: Patient/Family Education  Outcome: Progressing Towards Goal     Problem: Patient Education: Go to Patient Education Activity  Goal: Patient/Family Education  Outcome: Progressing Towards Goal     Problem: Unstable angina/NSTEMI: Day of Admission/Day 1  Goal: Off Pathway (Use only if patient is Off Pathway)  Outcome: Progressing Towards Goal  Goal: Activity/Safety  Outcome: Progressing Towards Goal  Goal: Consults, if ordered  Outcome: Progressing Towards Goal  Goal: Diagnostic Test/Procedures  Outcome: Progressing Towards Goal  Goal: Nutrition/Diet  Outcome: Progressing Towards Goal  Goal: Discharge Planning  Outcome: Progressing Towards Goal  Goal: Medications  Outcome: Progressing Towards Goal  Goal: Respiratory  Outcome: Progressing Towards Goal  Goal: Treatments/Interventions/Procedures  Outcome: Progressing Towards Goal  Goal: Psychosocial  Outcome: Progressing Towards Goal  Goal: *Hemodynamically stable  Outcome: Progressing Towards Goal  Goal: *Optimal pain control at patient's stated goal  Outcome: Progressing Towards Goal  Goal: *Lungs clear or at baseline  Outcome: Progressing Towards Goal     Problem: Unstable angina/NSTEMI: Day 2  Goal: Off Pathway (Use only if patient is Off Pathway)  Outcome: Progressing Towards Goal  Goal: Activity/Safety  Outcome: Progressing Towards Goal  Goal: Consults, if ordered  Outcome: Progressing Towards Goal  Goal: Diagnostic Test/Procedures  Outcome: Progressing Towards Goal  Goal: Nutrition/Diet  Outcome: Progressing Towards Goal  Goal: Discharge Planning  Outcome: Progressing Towards Goal  Goal: Medications  Outcome: Progressing Towards Goal  Goal: Respiratory  Outcome: Progressing Towards Goal  Goal: Treatments/Interventions/Procedures  Outcome: Progressing Towards Goal  Goal: Psychosocial  Outcome: Progressing Towards Goal  Goal: *Hemodynamically stable  Outcome: Progressing Towards Goal  Goal: *Optimal pain control at patient's stated goal  Outcome: Progressing Towards Goal  Goal: *Lungs clear or at baseline  Outcome: Progressing Towards Goal     Problem: Unstable Angina/NSTEMI: Discharge Outcomes  Goal: *Hemodynamically stable  Outcome: Progressing Towards Goal  Goal: *Stable cardiac rhythm  Outcome: Progressing Towards Goal  Goal: *Lungs clear or at baseline  Outcome: Progressing Towards Goal  Goal: *Optimal pain control at patient's stated goal  Outcome: Progressing Towards Goal  Goal: *Identifies cardiac risk factors  Outcome: Progressing Towards Goal  Goal: *Verbalizes home exercise program, activity guidelines, cardiac precautions  Outcome: Progressing Towards Goal  Goal: *Verbalizes understanding and describes prescribed diet  Outcome: Progressing Towards Goal  Goal: *Verbalizes name, dosage, time, side effects, and number of days to continue medications  Outcome: Progressing Towards Goal  Goal: *Anxiety reduced or absent  Outcome: Progressing Towards Goal  Goal: *Understands and describes signs and symptoms to report to providers(Stroke Metric)  Outcome: Progressing Towards Goal  Goal: *Describes follow-up/return visits to physicians  Outcome: Progressing Towards Goal  Goal: *Describes available resources and support systems  Outcome: Progressing Towards Goal  Goal: *Influenza immunization  Outcome: Progressing Towards Goal  Goal: *Pneumococcal immunization  Outcome: Progressing Towards Goal  Goal: *Describes smoking cessation resources  Outcome: Progressing Towards Goal

## 2022-10-25 NOTE — PROGRESS NOTES
Bedside shift change report given to Colletta Chimes, RN (oncoming nurse) by Nima Rojas RN (offgoing nurse). Report included the following information SBAR, Kardex, Intake/Output, MAR, and Recent Results.

## 2022-10-25 NOTE — PROGRESS NOTES
Hospitalist Progress Note                               Salvador Patel MD                                     Answering service: [phone]                               OR [phone] from in house phone                                         Date of Service:  10/25/2022  NAME:  Julio Cesar Pastrana  :  1945  MRN:  [id number]      Admission Summary:   The patient is a 72-year-old  male who is admitted directly to this hospital.  I have spoken with his daughter who has given most of the history. The patient has multiple medical problems including diabetes, hyperlipidemia, anxiety, depression, dysphagia, chronic pain, memory loss, dementia, hypertension, and aspiration pneumonia. The patient was mainly sent to the hospital because of back pain which started a few days ago. The patient has no falls. The patient has poorly-controlled high blood pressure. He denies having any chest pain or shortness of breath. He was evaluated in the emergency room. CT scan was done, which showed no acute finding. However, it showed a 2.1 x 1.5 cm mass in the left kidney with a small amount of left retroperitoneal hemorrhage within the perirenal and pararenal space. It was decided to admit the patient for further evaluation. The patient is a poor historian. According to the daughter, he was living alone. She called the Adult Protective Services as he was not taking care of himself. Recently also had some problem with aspiration pneumonia and failed barium study. He was seen by ENT. A CT of the neck was done by ENT which was unremarkable  The patient denies having any problem with urination. He has no chest pain    Reason for follow up:    No complaints, visiting with chaplan, asking for more pain meds if possible      Assessment & Plan:     1. Back pain. Imaging is suggestive of left kidney exophytic mass. MRI showed 1.  1.8 x 1.6 x 1.7 cm enhancing mass at the inferior pole of the LEFT kidney  suspicious for RCC unless proven otherwise. Persistent, mild LEFT perinephric stranding. No new or increased fluid  collection is seen. No abdominal adenopathy is seen. Urology help appreciated. Palliative care saw patient and plan to discharge with hospice services tomorrow. 2.  Hold aspirin. Hemoglobin slightly lower monitor     3. History of neurocognitive impairment, has been seen by Neuropsych. We will monitor closely. Plan to discharge tomorrow with hospice services. 4.  History of hypertension. We will continue current regimen. 5.  History of chronic kidney disease stage III. stable, cont to monitor. 6.  History of aspiration. SLP evaluated. 7.  Small Retroperitoneal hemorrhage stable will monitor if bigger may need GS to evaluate. Code status: Full  DVT prophylaxis: SCD  Care Plan discussed with: Patient's daughter  Patient has given Verbal permission to discuss medical care with   persons present in the room and and also with contact as listed on face sheet. Hospital Problems  Never Reviewed            Codes Class Noted POA    Renal mass ICD-10-CM: N28.89  ICD-9-CM: 593.9  10/23/2022 Unknown        Chest pain ICD-10-CM: R07.9  ICD-9-CM: 786.50  10/21/2022 Unknown         Review of Systems:   A comprehensive review of systems was negative except for that written in the HPI. Physical Examination:      Last 24hrs VS reviewed since prior progress note. Most recent are:  Visit Vitals  BP (!) 112/59 (BP 1 Location: Right upper arm, BP Patient Position: At rest)   Pulse (!) 50   Temp 97.7 °F (36.5 °C)   Resp 18   Ht 5' 6\" (1.676 m)   Wt 99.8 kg (220 lb)   SpO2 94%   BMI 35.51 kg/m²           Constitutional:  No acute distress, cooperative, pleasant    HEENT: Head is a traumatic,  Un icteric sclera. Pink conjunctiva,no erythema or discharge. Oral mucous moist, oropharynx benign. Neck supple,    Resp:  CTA bilaterally.  No wheezing/rhonchi/rales. No accessory muscle use   CV:  Regular rhythm, normal rate, no murmurs, gallops, rubs    GI:  Soft, non distended, non tender. normoactive bowel sounds, no hepatosplenomegaly    :  No CVA or suprapubic tenderness   Skin  :  No erythema,rash,bullae,dipigmentation     Musculoskeletal:  No edema, warm, 2+ pulses throughout slight tenderness in back     Neurologic:  AAOx3, CN II-XII reviewed. Moves all extremities. Skin:  Good turgor, no rashes or ulcers       Intake/Output Summary (Last 24 hours) at 10/25/2022 1511  Last data filed at 10/25/2022 0719  Gross per 24 hour   Intake 1400 ml   Output --   Net 1400 ml            Data Review:    Review and/or order of clinical lab test      Labs:     Recent Labs     10/25/22  0414 10/24/22  0458   WBC 4.7 5.0   HGB 10.7* 11.7*   HCT 33.6* 36.0*    271       Recent Labs     10/25/22  0414 10/24/22  0458 10/23/22  0005    143 142   K 3.7 3.9 3.8    111* 110*   CO2 27 28 26   BUN 27* 24* 25*   CREA 1.25 1.20 1.16   * 135* 148*   CA 8.6 9.5 8.8       No results for input(s): ALT, AP, TBIL, TBILI, TP, ALB, GLOB, GGT, AML, LPSE in the last 72 hours. No lab exists for component: SGOT, GPT, AMYP, HLPSE    No results for input(s): INR, PTP, APTT, INREXT, INREXT in the last 72 hours. No results for input(s): FE, TIBC, PSAT, FERR in the last 72 hours. No results found for: FOL, RBCF   No results for input(s): PH, PCO2, PO2 in the last 72 hours. No results for input(s): CPK, CKNDX, TROIQ in the last 72 hours.     No lab exists for component: CPKMB  No results found for: CHOL, CHOLX, CHLST, CHOLV, HDL, HDLP, LDL, LDLC, DLDLP, TGLX, TRIGL, TRIGP, CHHD, CHHDX  Lab Results   Component Value Date/Time    Glucose (POC) 154 (H) 10/24/2022 11:11 PM    Glucose (POC) 104 (H) 10/21/2022 01:01 PM    Glucose (POC) 112 (H) 01/11/2019 04:16 PM    Glucose (POC) 116 (H) 01/11/2019 11:10 AM    Glucose (POC) 148 (H) 01/11/2019 06:49 AM    Glucose (POC) 187 (H) 01/10/2019 09:45 PM     Lab Results   Component Value Date/Time    Color YELLOW/STRAW 10/21/2022 12:07 PM    Appearance CLEAR 10/21/2022 12:07 PM    Specific gravity 1.010 10/21/2022 12:07 PM    Specific gravity 1.011 01/11/2019 02:57 PM    pH (UA) 6.5 10/21/2022 12:07 PM    Protein TRACE (A) 10/21/2022 12:07 PM    Glucose Negative 10/21/2022 12:07 PM    Ketone Negative 10/21/2022 12:07 PM    Bilirubin Negative 10/21/2022 12:07 PM    Urobilinogen 0.2 10/21/2022 12:07 PM    Nitrites Negative 10/21/2022 12:07 PM    Leukocyte Esterase Negative 10/21/2022 12:07 PM    Epithelial cells FEW 10/21/2022 12:07 PM    Bacteria Negative 10/21/2022 12:07 PM    WBC 0-4 10/21/2022 12:07 PM    RBC 0-5 10/21/2022 12:07 PM         Medications Reviewed:     Current Facility-Administered Medications   Medication Dose Route Frequency    LORazepam (ATIVAN) tablet 0.5 mg  0.5 mg Oral QHS PRN    fentaNYL (DURAGESIC) 12 mcg/hr patch 1 Patch  1 Patch TransDERmal Q72H    oxyCODONE-acetaminophen (PERCOCET 7.5) 7.5-325 mg per tablet 1 Tablet  1 Tablet Oral Q6H PRN    atorvastatin (LIPITOR) tablet 20 mg  20 mg Oral QHS    carvediloL (COREG) tablet 25 mg  25 mg Oral BID WITH MEALS    cholecalciferol (VITAMIN D3) (1000 Units /25 mcg) tablet 2,000 Units  2,000 Units Oral DAILY WITH BREAKFAST    fenofibrate (LOFIBRA) tablet 160 mg  160 mg Oral QHS    cloNIDine HCL (CATAPRES) tablet 0.1 mg  0.1 mg Oral QHS    traZODone (DESYREL) tablet 50 mg  50 mg Oral QHS    QUEtiapine (SEROquel) tablet 50 mg  50 mg Oral BID    tamsulosin (FLOMAX) capsule 0.4 mg  0.4 mg Oral DAILY    famotidine (PEPCID) tablet 20 mg  20 mg Oral DAILY    [Held by provider] aspirin chewable tablet 81 mg  81 mg Oral DAILY    amLODIPine (NORVASC) tablet 10 mg  10 mg Oral DAILY    And    lisinopriL (PRINIVIL, ZESTRIL) tablet 40 mg  40 mg Oral DAILY    sodium chloride (NS) flush 5-40 mL  5-40 mL IntraVENous Q8H    sodium chloride (NS) flush 5-40 mL  5-40 mL IntraVENous PRN acetaminophen (TYLENOL) tablet 650 mg  650 mg Oral Q6H PRN    Or    acetaminophen (TYLENOL) suppository 650 mg  650 mg Rectal Q6H PRN    polyethylene glycol (MIRALAX) packet 17 g  17 g Oral DAILY PRN    ondansetron (ZOFRAN ODT) tablet 4 mg  4 mg Oral Q8H PRN    Or    ondansetron (ZOFRAN) injection 4 mg  4 mg IntraVENous Q6H PRN     ______________________________________________________________________  EXPECTED LENGTH OF STAY: 4d 7h  ACTUAL LENGTH OF STAY:          2                 Jennifer Hernandez MD

## 2022-10-25 NOTE — PROGRESS NOTES
Bedside shift change report given to Ellie Martinez RN (oncoming nurse) by CODIE Ragland (offgoing nurse). Report included the following information SBAR, Kardex, Intake/Output, MAR, and Recent Results.

## 2022-10-25 NOTE — PROGRESS NOTES
10/25/2022  2:33 PM  Update via chart review, pt is continuing to require medical management for Back pain.   Imaging is suggestive of left kidney exophytic mass,   History of neurocognitive impairment,  History of hypertension, History of chronic kidney disease stage III,  History of aspiration  Transitions of Care Plan:  RUR 10 % Low Risk of Readmission/Green  LOS 2 Days   Medical management continues   Palliative consult, order placed for Hospice info session  SLP following   CM met w/ pt's Dtr Karly VAZLUCY  who he lives w/ [address] offered and letter signed for [facility], referral sent in [facility], CM spoke w/ Darren Bloch [facility] liaison she will contact pt's Dtr to arrange [facility] consult placed as pt would like to meet w/   DC when stable to home w/ family assistance  Transport TBD stretcher vs WC Van vs Family  CM will continue to follow and assist w/ DC needs  Jenna Dodge, NICOLE

## 2022-10-25 NOTE — PROGRESS NOTES
Spiritual Care Partner Volunteer visited patient at [address] in [facility] 4M POST SURG ORT 1 on 10/25/2022     Documented by:  Visited by: Dani Sparrow.  Rosa Martin, [address] [place] [address] Road paging Service [phone] ([phone])

## 2022-10-25 NOTE — ACP (ADVANCE CARE PLANNING)
Primary Decision MakerNorma Lofton - Daughter - [phone]    Secondary Decision Maker: Niels Cazares (stepdtr) - Child - [phone]  Advance Care Planning 10/25/2022   Patient's Healthcare Decision Maker is: Named in scanned ACP document   Confirm Advance Directive Yes, on file      Pt has AMD on file dated 10/29/2015 which appoints dtr Nitin Acres and step-dtr Bruce Pill as primary and secondary Medical POAs in respective order.

## 2022-10-25 NOTE — CONSULTS
Palliative Medicine Consult  Armaan: [phone] ([phone])    Patient Name: Janice Menchaca  YOB: 1945    Date of Initial Consult: 10/25/22  Reason for Consult: overwhelming symptoms  Requesting Provider: Dr. Lesvia Luke   Primary Care Physician: Zeb Mendez MD     SUMMARY:   Janice Menchaca is a 78 y.o. with a medical history of mild cognitive impairment, obesity, DM type 2, HTN, HLD, anxiety and depression, h/o back surgery, chronic pain, who was admitted on 10/21/2022 from the ED with a finding of a new left exophytic renal mass measuring 2.1x1.5cm. Initial presenting symptoms were chest pain the night prior and anxiety. Current medical issues leading to Palliative Medicine involvement include: poor functional baseline, review of care goals. SH:  . He moved in with daughter Jatinder Mcintosh in July. He has a stepdaughter Yaquelin out of state. Most recently he spends most time in bed, when he gets up he is unsteady, falls twice weekly. PALLIATIVE DIAGNOSES:   Encounter for palliative care  Goals of care   DNR discussion   Left renal mass concerning for RCC by imaging  Debility  Class II obesity     PLAN:   Pt assessed this am, initially no visitors. He was alert and able to converse easily about himself and his family. He was slightly confused as to recent events- for example he initially told me he was still living alone, but later clarified he has moved in with his daughter. He was able to tell me the events that brought him to the ED accurately. He also understood a renal mass was found. I talked with him about the mass findings and that the urologist who reviewed the scan and his medical record was concerned he may not prove to be a strong surgical candidate. Patient was very accepting of this, telling me he prefers to avoid any further procedures as he has had so many in the past.  He was not eager for further workup or acute care.      Called and spoke with his daughter Clarence Young, an RN. She shared that her Father has not been motivated to improve his health in a long time. He has been gaining weight, living a very sedentary lifestyle. Spends 95% of his time in his bedroom. When he does ambulate he is unbalanced, falls twice weekly. He has been discharged from several home health agencies due to not making progress with therapy. She worries about what best next steps are for him considering his baseline. We discussed that considering his comorbid conditions and functional status, he would be a poor candidate for most cancer directed therapies. Clarence Young very much agrees with this and doesn't think he would do well with acute treatments. She hopes her Father will agree with this. We also discussed his code status and she agrees that DNR status would be appropriate for him, she hopes he will agree. Later in afternoon met daughter in room with patient. First discussed his code status and patient agreeable for DNR status stating he would not want to be put through heroics when it is his time. With Ayse's support, he signed a durable DNR form with me, which I placed for scanning into his EMR and gave the original and copies to his daughter. We then talked with patient about his care options and he reiterated that his preference was to focus on his quality of life. Clarence Young was hopeful for a clear decision from patient on [facility] or not he preferred [facility] vs [facility].  I made the recommendation to refer to [facility]- and allow [facility] to assess what service best fits patient. Dr. Shey Cordova who has already seen patient in the home, [place] both programs. Discussed with CM who will facilitate referral.   Reassured Clarence Young that her Father's medicines would all be continued with a Hospice care plan. Also provided daughter with a blank POST form.   This may be helpful if decision is to defer Hospice admission for now, but goals remain clear for comfort care. Thank you for the opportunity to participate in the care of [id number] with: Palliative IDT, [facility].  Please all our team tomorrow at [phone] if any questions. GOALS OF CARE / TREATMENT PREFERENCES:     GOALS OF CARE:  Patient/Health Care Proxy Stated Goals: Comfort    TREATMENT PREFERENCES:   Code Status: DNR    Patient and family's personal goals include: patient prefers to maintain his sedentary and comfortable lifestyle, he wants to eat as he pleases. Important upcoming milestones or family events: none reported    The patient identifies the following as important for living well: being comfortable      Advance Care Planning:  [x] The [place] [id number] Team has updated the ACP Navigator with [address] and Patient Capacity      Primary Decision Maker: Carlos Olivares - Daughter - [phone]    Secondary Decision Maker: Maye Alexey (stepdtr) - Child - [phone]    Advance Care Planning 10/25/2022   Patient's Healthcare Decision Maker is: Named in scanned ACP document   Confirm Advance Directive Yes, on file       Medical Interventions: Limited additional interventions       Other:    As far as possible, the palliative care team has discussed with patient / health care proxy about goals of care / treatment preferences for patient. HISTORY:     History obtained from:  EMR, patient, daughter    CHIEF COMPLAINT: right shoulder and right flank pain    HPI/SUBJECTIVE:    The patient is:   [x] Verbal and participatory  [] Non-participatory due to:     Mr. Ingrid Spain reports he was feeling generally unwell at home and his daughter checked his blood pressure which was very high, possibly 200s/100s. This prompted ED eval.  He was admitted due to finding of a left exophytic renal mass on imaging. Today he reports pain in his shoulder and right flank.   States it has been present for several months. His new PCP started him on a Fentanyl patch, he was leary of this but states it has helped dull the pain. He also takes oxycodone as needed. He has no new symptoms. Clinical Pain Assessment (nonverbal scale for severity on nonverbal patients):   Clinical Pain Assessment  Severity: 6  Location: right shoulder and right flank  Character: ache  Duration: weeks to months  Effect: limits mobility, falls  Frequency: fairly persistent, improvement with opioid          Duration: for how long has pt been experiencing pain (e.g., 2 days, 1 month, years)  Frequency: how often pain is an issue (e.g., several times per day, once every few days, constant)     FUNCTIONAL ASSESSMENT:     Palliative Performance Scale (PPS):  PPS: 50       PSYCHOSOCIAL/SPIRITUAL SCREENING:     Palliative IDT has assessed this patient for cultural preferences / practices and a referral made as appropriate to needs (Cultural Services, Patient Advocacy, Ethics, etc.)    Any spiritual / Baptist concerns:  [] Yes /  [x] No   If \"Yes\" to discuss with pastoral care during IDT     Does caregiver feel burdened by caring for their loved one:   [x] Yes /  [] No /  [] No Caregiver Present/Available [] No Caregiver [] Pt Lives at Facility  If \"Yes\" to discuss with social work during IDT    Anticipatory grief assessment:   [x] Normal  / [] Maladaptive     If \"Maladaptive\" to discuss with social work during IDT    ESAS Anxiety: Anxiety: 0    ESAS Depression:          REVIEW OF SYSTEMS:     Positive and pertinent negative findings in ROS are noted above in HPI. The following systems were [x] reviewed / [] unable to be reviewed as noted in HPI  Other findings are noted below. Systems: constitutional, ears/nose/mouth/throat, respiratory, gastrointestinal, genitourinary, musculoskeletal, integumentary, neurologic, psychiatric, endocrine. Positive findings noted below.   Modified ESAS Completed by: provider     Drowsiness: 4     Pain: 6 Anxiety: 0 Nausea: 0   Anorexia: 0 Dyspnea: 0           Stool Occurrence(s): 1        PHYSICAL EXAM:     From RN flowsheet:  Wt Readings from Last 3 Encounters:   10/21/22 220 lb (99.8 kg)   01/07/19 266 lb 8.6 oz (120.9 kg)   12/28/18 266 lb 9 oz (120.9 kg)     Blood pressure 135/73, pulse (!) 56, temperature 97.6 °F (36.4 °C), resp. rate 16, height 5' 6\" (1.676 m), weight 220 lb (99.8 kg), SpO2 91 %.     Pain Scale 1: Numeric (0 - 10)  Pain Intensity 1: 0  Pain Onset 1: 6 days ago  Pain Location 1: Back  Pain Orientation 1: Lower  Pain Description 1: Aching  Pain Intervention(s) 1: Medication (see MAR)  Last bowel movement, if known:     Constitutional: obese white male; lying in bed, no overt distress  Eyes: sclerae anicteric  ENMT: nares patent, good dentition  Cardiovascular: regular bradycardia  Respiratory: breathing not labored at rest, symmetric bs anteriorly  Gastrointestinal: protuberant, soft non-tender, +bowel sounds  Musculoskeletal: no deformity, no tenderness to palpation  Skin: warm, dry, no rash  Neurologic: Alert and oriented to self, situation, answers simple questions appropriately, following commands, moving all extremities  Psychiatric: flat affect, not agitated or restless     HISTORY:     Active Problems:    Chest pain (10/21/2022)      Renal mass (10/23/2022)    Past Medical History:   Diagnosis Date    Chronic kidney disease     Stage 2    Diabetes mellitus with stage 2 chronic kidney disease (HCC)     High cholesterol     Morbid obesity with BMI of 40.0-44.9, adult (HCC)     MRSA infection 2016    Abcess on abdomen    Sleep apnea with use of continuous positive airway pressure (CPAP)     Tinnitus aurium, bilateral 2018      Past Surgical History:   Procedure Laterality Date    HX CERVICAL FUSION N/A 2017    HX CHOLECYSTECTOMY      HX COLONOSCOPY N/A     HX CYST INCISION AND DRAINAGE N/A 2015    Cyst on abdomen    HX LUMBAR FUSION N/A 01/07/2019    L 4-S1 Lami & L4-5 Fusion    HX TONSILLECTOMY N/A       Family History   Problem Relation Age of Onset    Pneumonia Mother     Heart Attack Father 45    Heart Surgery Sister 76        CABG    No Known Problems Sister       History reviewed, no pertinent family history. Social History     Tobacco Use    Smoking status: Former     Packs/day: 1.50     Years: 20.00     Pack years: 30.00     Types: Cigarettes     Quit date:      Years since quittin.8    Smokeless tobacco: Never   Substance Use Topics    Alcohol use:  Yes     Alcohol/week: 5.0 standard drinks     Types: 1 Glasses of wine, 2 Cans of beer, 2 Shots of liquor per week     No Known Allergies   Current Facility-Administered Medications   Medication Dose Route Frequency    acetaminophen (TYLENOL) tablet 650 mg  650 mg Oral Q6H    fentaNYL (DURAGESIC) 25 mcg/hr patch 1 Patch  1 Patch TransDERmal Q72H    LORazepam (ATIVAN) tablet 0.5 mg  0.5 mg Oral QHS PRN    oxyCODONE-acetaminophen (PERCOCET 7.5) 7.5-325 mg per tablet 1 Tablet  1 Tablet Oral Q6H PRN    atorvastatin (LIPITOR) tablet 20 mg  20 mg Oral QHS    carvediloL (COREG) tablet 25 mg  25 mg Oral BID WITH MEALS    cholecalciferol (VITAMIN D3) (1000 Units /25 mcg) tablet 2,000 Units  2,000 Units Oral DAILY WITH BREAKFAST    fenofibrate (LOFIBRA) tablet 160 mg  160 mg Oral QHS    cloNIDine HCL (CATAPRES) tablet 0.1 mg  0.1 mg Oral QHS    traZODone (DESYREL) tablet 50 mg  50 mg Oral QHS    QUEtiapine (SEROquel) tablet 50 mg  50 mg Oral BID    tamsulosin (FLOMAX) capsule 0.4 mg  0.4 mg Oral DAILY    famotidine (PEPCID) tablet 20 mg  20 mg Oral DAILY    [Held by provider] aspirin chewable tablet 81 mg  81 mg Oral DAILY    amLODIPine (NORVASC) tablet 10 mg  10 mg Oral DAILY    And    lisinopriL (PRINIVIL, ZESTRIL) tablet 40 mg  40 mg Oral DAILY    sodium chloride (NS) flush 5-40 mL  5-40 mL IntraVENous Q8H    sodium chloride (NS) flush 5-40 mL  5-40 mL IntraVENous PRN    polyethylene glycol (MIRALAX) packet 17 g  17 g Oral DAILY PRN ondansetron (ZOFRAN ODT) tablet 4 mg  4 mg Oral Q8H PRN    Or    ondansetron (ZOFRAN) injection 4 mg  4 mg IntraVENous Q6H PRN          LAB AND IMAGING FINDINGS:     Lab Results   Component Value Date/Time    WBC 4.7 10/25/2022 04:14 AM    HGB 10.7 (L) 10/25/2022 04:14 AM    PLATELET 883 99/22/2194 04:14 AM     Lab Results   Component Value Date/Time    Sodium 140 10/25/2022 04:14 AM    Potassium 3.7 10/25/2022 04:14 AM    Chloride 108 10/25/2022 04:14 AM    CO2 27 10/25/2022 04:14 AM    BUN 27 (H) 10/25/2022 04:14 AM    Creatinine 1.25 10/25/2022 04:14 AM    Calcium 8.6 10/25/2022 04:14 AM    Magnesium 1.9 10/21/2022 11:17 AM      Lab Results   Component Value Date/Time    Alk. phosphatase 52 10/21/2022 11:17 AM    Protein, total 7.2 10/21/2022 11:17 AM    Albumin 3.4 (L) 10/21/2022 11:17 AM    Globulin 3.8 10/21/2022 11:17 AM     Lab Results   Component Value Date/Time    INR 1.1 12/28/2018 04:00 PM    Prothrombin time 11.3 (H) 12/28/2018 04:00 PM    aPTT 28.7 12/28/2018 04:00 PM      No results found for: IRON, FE, TIBC, IBCT, PSAT, FERR   No results found for: PH, PCO2, PO2  No components found for: GLPOC   No results found for: CPK, CKMB             Total time: 75m   Counseling / coordination time, spent as noted above: 75m  > 50% counseling / coordination?: y    Prolonged service was provided for  []30 min   []75 min in face to face time in the presence of the patient, spent as noted above. Time Start:  11:00  Time End: 11:45  Time Start:  13:30  Time End:  14:00  Note: this can only be billed with  (initial) or [id number] (follow up).   If multiple start / stop times, list each separately.'

## 2022-10-26 VITALS
DIASTOLIC BLOOD PRESSURE: 72 MMHG | RESPIRATION RATE: 16 BRPM | WEIGHT: 220 LBS | HEART RATE: 60 BPM | TEMPERATURE: 98.9 F | HEIGHT: 66 IN | OXYGEN SATURATION: 93 % | BODY MASS INDEX: 35.36 KG/M2 | SYSTOLIC BLOOD PRESSURE: 137 MMHG

## 2022-10-26 PROCEDURE — 74011000250 HC RX REV CODE- 250: Performed by: INTERNAL MEDICINE

## 2022-10-26 PROCEDURE — 74011250637 HC RX REV CODE- 250/637: Performed by: FAMILY MEDICINE

## 2022-10-26 PROCEDURE — 74011250637 HC RX REV CODE- 250/637: Performed by: HOSPITALIST

## 2022-10-26 RX ORDER — LORAZEPAM 0.5 MG/1
0.5 TABLET ORAL
Qty: 30 TABLET | Refills: 0 | Status: SHIPPED | OUTPATIENT
Start: 2022-10-26

## 2022-10-26 RX ORDER — TAMSULOSIN HYDROCHLORIDE 0.4 MG/1
0.4 CAPSULE ORAL DAILY
Qty: 30 CAPSULE | Refills: 0 | Status: SHIPPED | OUTPATIENT
Start: 2022-10-27

## 2022-10-26 RX ORDER — FENTANYL 25 UG/1
1 PATCH TRANSDERMAL
Qty: 10 PATCH | Refills: 0 | Status: SHIPPED | OUTPATIENT
Start: 2022-10-28 | End: 2022-11-27

## 2022-10-26 RX ADMIN — FAMOTIDINE 20 MG: 20 TABLET, FILM COATED ORAL at 09:55

## 2022-10-26 RX ADMIN — ACETAMINOPHEN 650 MG: 325 TABLET, FILM COATED ORAL at 00:13

## 2022-10-26 RX ADMIN — SODIUM CHLORIDE, PRESERVATIVE FREE 10 ML: 5 INJECTION INTRAVENOUS at 05:12

## 2022-10-26 RX ADMIN — AMLODIPINE BESYLATE 10 MG: 5 TABLET ORAL at 09:55

## 2022-10-26 RX ADMIN — TAMSULOSIN HYDROCHLORIDE 0.4 MG: 0.4 CAPSULE ORAL at 09:55

## 2022-10-26 RX ADMIN — Medication 2000 UNITS: at 09:54

## 2022-10-26 RX ADMIN — QUETIAPINE FUMARATE 50 MG: 25 TABLET ORAL at 09:54

## 2022-10-26 RX ADMIN — ACETAMINOPHEN 650 MG: 325 TABLET, FILM COATED ORAL at 05:12

## 2022-10-26 RX ADMIN — CARVEDILOL 25 MG: 12.5 TABLET, FILM COATED ORAL at 09:55

## 2022-10-26 RX ADMIN — LISINOPRIL 40 MG: 20 TABLET ORAL at 09:55

## 2022-10-26 RX ADMIN — ACETAMINOPHEN 650 MG: 325 TABLET, FILM COATED ORAL at 12:04

## 2022-10-26 NOTE — PROGRESS NOTES
10/26/2022  2:56 PM  DC Summary sent to [facility] in [address] [facility], [place]       11:33 AM  CM received TC from [facility], requesting Rx for Lorazepam until Friday 10 /28 as hospice meds are mailed, MD Lesvia Luke will send Rx w/ pt for family to fill. NICOLE Knowles       11:14 AM  [facility] transport has been confirmed for 1300 and transport packet compel stanislav and placed w/ bedside chart. Nursing updated. CM spoke w/ Elsie Kaufman from [facility] and updated on transport time to coordinate w/ Hospice admission, she will contact Dtr Jatinder Mcintosh and update. Await DC order   Pt is ready for DC from CM standpoint  Care Management Interventions  PCP Verified by CM: Yes (Carine Shelton)  Mode of Transport at Discharge: Other (see comment)  Transition of Care Consult (CM Consult): [place]: No  MyChart Signup: No  Discharge Durable Medical Equipment: No  Physical Therapy Consult: No  Occupational Therapy Consult: No  Speech Therapy Consult: No  Support Systems: Child(owen)  Confirm Follow Up Transport: Family  The Plan for Transition of Care is Related to the Following Treatment Goals : Chest pain / renal mass  The Patient and/or Patient Representative was Provided with a Choice of Provider and Agrees with the Discharge Plan?: (S) Yes  Name of the Patient Representative Who was Provided with a Choice of Provider and Agrees with the Discharge Plan: Self  Freedom of Choice List was Provided with Basic Dialogue that Supports the Patient's Individualized Plan of Care/Goals, Treatment Preferences and Shares the Quality Data Associated with the Providers?: (S) Yes  Discharge Location  Patient Expects to be Discharged to[de-identified] Home with hospice ([facility])    10:18 AM  CM spoke esequiel/ Elsie Kaufman [facility] liaison [phone],  and I am coordinating for admission today. CM requested [facility] transport at 1300, PCS sent in [employer]; await response.   CM updated Daughter Jatinder Mcintosh Await DC order.   Will follow and assist.  NICOLE Lui

## 2022-10-26 NOTE — DISCHARGE SUMMARY
Segundo Zamora [facility]  [address] [facility], [address] [place] [address]  [phone]    [address] Adult  Hospitalist Group     Discharge Summary       PATIENT ID: Armaan Shah  MRN: [id number]   YOB: 1945    DATE OF ADMISSION: 10/21/2022 10:04 AM    DATE OF DISCHARGE: 10/26/22   PRIMARY CARE PROVIDER: Christiana Estrella MD     DISCHARGING PROVIDER: Santi Grant MD      CONSULTATIONS: IP CONSULT TO PALLIATIVE CARE - PROVIDER    PROCEDURES/SURGERIES: * No surgery found *    ADMITTING DIAGNOSES & HOSPITAL COURSE:   1.  Back pain. Imaging is suggestive of left kidney exophytic mass. MRI showed 1. 1.8 x 1.6 x 1.7 cm enhancing mass at the inferior pole of the LEFT kidney  suspicious for RCC unless proven otherwise. Persistent, mild LEFT perinephric stranding. No new or increased fluid  collection is seen. No abdominal adenopathy is seen. Urology help appreciated. Palliative care saw patient and plan to discharge with hospice services. 2.  Hold aspirin. Hemoglobin slightly lower monitor      3. History of neurocognitive impairment, has been seen by Neuropsych. We will monitor closely. Plan to discharge tomorrow with hospice services. 4.  History of hypertension. We will continue current regimen. 5.  History of chronic kidney disease stage III. stable, cont to monitor. 6.  History of aspiration. SLP evaluated.       7.  Small Retroperitoneal hemorrhage stable    PENDING TEST RESULTS:   At the time of discharge the following test results are still pending: none    FOLLOW UP APPOINTMENTS:    Follow-up Information       Follow up With Specialties Details Why Contact Info    [facility]  Follow up Mohamud Barksdaleelyne [address]  Phone: [phone]               DIET: regular    ACTIVITY: as tolerated       DISCHARGE MEDICATIONS:  Current Discharge Medication List        START taking these medications Details   fentaNYL (DURAGESIC) 25 mcg/hr PATCH 1 Patch by TransDERmal route every seventy-two (72) hours for 30 days. Max Daily Amount: 1 Patch. Qty: 10 Patch, Refills: 0  Start date: 10/28/2022, End date: 11/27/2022    Associated Diagnoses: Renal mass      LORazepam (ATIVAN) 0.5 mg tablet Take 1 Tablet by mouth nightly as needed for Anxiety. Max Daily Amount: 0.5 mg.  Qty: 30 Tablet, Refills: 0  Start date: 10/26/2022    Associated Diagnoses: Renal mass      tamsulosin (FLOMAX) 0.4 mg capsule Take 1 Capsule by mouth daily. Qty: 30 Capsule, Refills: 0  Start date: 10/27/2022           CONTINUE these medications which have NOT CHANGED    Details   oxyCODONE-acetaminophen (Percocet) 7.5-325 mg per tablet Take  by mouth every six (6) hours as needed for Pain. omeprazole (PRILOSEC) 20 mg capsule Take 20 mg by mouth daily. traZODone (DESYREL) 50 mg tablet Take  by mouth nightly. carvedilol (COREG) 25 mg tablet Take 25 mg by mouth two (2) times daily (with meals). hydroCHLOROthiazide (HYDRODIURIL) 25 mg tablet Take 25 mg by mouth daily (with breakfast). amLODIPine-benazepril (LOTREL) 10-40 mg per capsule Take 1 Cap by mouth daily. pioglitazone-metFORMIN (ACTOPLUS MET)  mg per tablet Take 1 Tab by mouth daily (with breakfast). cloNIDine (CATAPRES) 0.1 mg/24 hr ptwk 1 Patch by TransDERmal route every seven (7) days.  Applies every Sunday           STOP taking these medications       fentaNYL (DURAGESIC) 12 mcg/hr patch Comments:   Reason for Stopping:         aspirin 81 mg chewable tablet Comments:   Reason for Stopping:         fish oil-dha-epa 1,200-144-216 mg cap Comments:   Reason for Stopping:         fenofibrate (LOFIBRA) 160 mg tablet Comments:   Reason for Stopping:         atorvastatin (LIPITOR) 20 mg tablet Comments:   Reason for Stopping:         HYDROmorphone (DILAUDID) 2 mg tablet Comments:   Reason for Stopping:         naloxone (NARCAN) 4 mg/actuation nasal spray Comments:   Reason for Stopping:         garlic cap Comments:   Reason for Stopping:         cholecalciferol, vitamin D3, 50 mcg (2,000 unit) tab Comments:   Reason for Stopping:         MV,Ca,Min/Iron/FA/Lyc/Lut/Phyt (Liveset PO) Comments:   Reason for Stopping:         gabapentin (NEURONTIN) 600 mg tablet Comments:   Reason for Stopping:                 NOTIFY YOUR PHYSICIAN FOR ANY OF THE FOLLOWING:   Fever over 101 degrees for 24 hours. Chest pain, shortness of breath, fever, chills, nausea, vomiting, diarrhea, change in mentation, falling, weakness, bleeding. Severe pain or pain not relieved by medications. Or, any other signs or symptoms that you may have questions about. DISPOSITION:  x  Home With:   OT  PT  HH  RN       Long term SNF/Inpatient Rehab    Independent/assisted living   x Hospice    Other:         PHYSICAL EXAMINATION AT DISCHARGE:  General:          Alert, cooperative, no distress, appears stated age. HEENT:           Atraumatic, anicteric sclerae, pink conjunctivae                          No oral ulcers, mucosa moist, throat clear, dentition fair  Neck:               Supple, symmetrical  Lungs:             Clear to auscultation bilaterally. No Wheezing or Rhonchi. No rales. Heart:              Regular  rhythm,  No  murmur   No edema  Abdomen:        Soft, non-tender. Not distended. Bowel sounds normal  Extremities:     No cyanosis. No clubbing,                            Skin turgor normal, Capillary refill normal  Skin:                Not pale. Not Jaundiced  No rashes   Psych:             Not anxious or agitated.   Neurologic:      Alert, moves all extremities, answers questions appropriately and responds to commands       CHRONIC MEDICAL DIAGNOSES:  Problem List as of 10/26/2022 Never Reviewed            Codes Class Noted - Resolved    Renal mass ICD-10-CM: N28.89  ICD-9-CM: 593.9  10/23/2022 - Present        Chest pain ICD-10-CM: R07.9  ICD-9-CM: 786.50 10/21/2022 - Present        Spondylolisthesis, lumbar region ICD-10-CM: M43.16  ICD-9-CM: 738.4  1/7/2019 - Present           Greater than 30 minutes were spent with the patient on counseling and coordination of care    Signed:   Santi Grant MD  10/26/2022  11:35 AM

## 2022-10-26 NOTE — PROGRESS NOTES
12:57 PM  I have reviewed discharge instructions with the patient. The patient verbalized understanding. AVS and prescriptions placed in envelope to go home with pt. PIV removed.  Pt AAO x 4 and VSS

## 2022-10-26 NOTE — PALLIATIVE CARE DISCHARGE
The Palliative Medicine team was consulted as part of your / your loved one's care in the hospital. Our team is a supportive service; we strive to relieve suffering and improve quality of life. You identified the following goal(s) as your main focus for healthcare: Patient/Health Care Proxy Stated Goals: Comfort. You will be returning home with hospice support in place. We reviewed advance care planning information, which includes the following:  Advance Care Planning 10/25/2022   Patient's Healthcare Decision Maker is: Named in scanned ACP document   Confirm Advance Directive Yes, on file       We reviewed / discussed your code status as: DNR     Full Code means perform CPR in the event of cardiac arrest     [facility] means do NOT perform CPR in the event of cardiac arrest     Partial Code means you have specific preferences, please discuss with your health care team     No Order means this issue was not addressed / resolved during your stay    You have a Durable Do Not Resuscitate Order in place, which should travel with you. When you are in a facility, this form should be placed on your chart. Once you are home, it is recommended that the [facility] form be placed in a visible location such as on the refrigerator or bedroom door. Because of the importance of this information, we are providing you with a printed copy to share with other healthcare providers after this hospitalization is complete. If any of the above information is incomplete or incorrect, please contact the Palliative Medicine team at [phone].

## 2022-10-27 NOTE — PROGRESS NOTES
Physician Progress Note      José Miguel Shi  CSN #:                  [id number]  :                       1945  ADMIT DATE:       10/21/2022 10:04 AM  DISCH DATE:        10/26/2022 1:30 PM  RESPONDING  PROVIDER #:        Indira Chandra MD          QUERY TEXTOpal Patron Afternoon    This patient admitted on 10/21/2022-to date, for \"back pain\". The pt has noted with left kidney exophytic mass. Urology consulted and confirms, but wants to do  workup as outpt. If possible, can you please clarify if any between the eleazar pain and the left kidney mass and please document this in the progress notes and discharge summary as: The medical record reflects the following:  Risk Factors: Age, Memory loss, Neurocognitive impairment. CKD stage 3, Kidney mass  Clinical Indicators: Presented with complaints of back pain, Known CKD stage 3, Urology consulted and noted \"small exophytic left renal mass on CT and MRI. Small amount of retroperitoneal fluid, Stable MRI. Not recommending any interventions at this time. Treatment: Urology consulted, MRI, CT back, Urology note recommending any interventions at this time. ASA held until Urology consulted. Thank you  Med Finnegan BSN, RN, CPHQ, CCDS, SMART  Options provided:  -- Back pain is due to left kidney exophytic mass  -- Back pain is unrelated to Left kidney exophytic mass  -- Other - I will add my own diagnosis  -- Disagree - Not applicable / Not valid  -- Disagree - Clinically unable to determine / Unknown  -- Refer to Clinical Documentation Reviewer    PROVIDER RESPONSE TEXT:    This patient has back pain due to Left exophytic mass.     Query created by: Alex Dalton on 10/24/2022 2:17 PM      Electronically signed by:  Indira Chandra MD 10/27/2022 12:00 PM

## 2023-01-01 ENCOUNTER — HOSPITAL ENCOUNTER (INPATIENT)
Age: 78
LOS: 6 days | End: 2023-03-20
Attending: FAMILY MEDICINE | Admitting: FAMILY MEDICINE
Payer: COMMERCIAL

## 2023-01-01 ENCOUNTER — HOSPICE ADMISSION (OUTPATIENT)
Dept: HOSPICE | Facility: HOSPICE | Age: 78
End: 2023-01-01
Payer: COMMERCIAL

## 2023-01-01 VITALS
RESPIRATION RATE: 22 BRPM | HEART RATE: 82 BPM | DIASTOLIC BLOOD PRESSURE: 49 MMHG | TEMPERATURE: 99.3 F | SYSTOLIC BLOOD PRESSURE: 66 MMHG | OXYGEN SATURATION: 86 %

## 2023-01-01 DIAGNOSIS — R41.0 DELIRIUM: ICD-10-CM

## 2023-01-01 DIAGNOSIS — G89.3 CANCER ASSOCIATED PAIN: Primary | ICD-10-CM

## 2023-01-01 DIAGNOSIS — Z51.5 HOSPICE CARE: ICD-10-CM

## 2023-01-01 DIAGNOSIS — C64.9 RENAL CELL CARCINOMA, UNSPECIFIED LATERALITY (HCC): ICD-10-CM

## 2023-01-01 PROCEDURE — 99233 SBSQ HOSP IP/OBS HIGH 50: CPT | Performed by: INTERNAL MEDICINE

## 2023-01-01 PROCEDURE — G0299 HHS/HOSPICE OF RN EA 15 MIN: HCPCS

## 2023-01-01 PROCEDURE — 74011000250 HC RX REV CODE- 250: Performed by: FAMILY MEDICINE

## 2023-01-01 PROCEDURE — 74011250636 HC RX REV CODE- 250/636: Performed by: FAMILY MEDICINE

## 2023-01-01 PROCEDURE — G0300 HHS/HOSPICE OF LPN EA 15 MIN: HCPCS

## 2023-01-01 PROCEDURE — 0656 HSPC GENERAL INPATIENT

## 2023-01-01 PROCEDURE — G0156 HHCP-SVS OF AIDE,EA 15 MIN: HCPCS

## 2023-01-01 RX ORDER — HYDROMORPHONE HYDROCHLORIDE 2 MG/ML
8 INJECTION, SOLUTION INTRAMUSCULAR; INTRAVENOUS; SUBCUTANEOUS EVERY 4 HOURS
Status: DISCONTINUED | OUTPATIENT
Start: 2023-01-01 | End: 2023-01-01

## 2023-01-01 RX ORDER — KETOROLAC TROMETHAMINE 30 MG/ML
30 INJECTION, SOLUTION INTRAMUSCULAR; INTRAVENOUS
Status: DISCONTINUED | OUTPATIENT
Start: 2023-01-01 | End: 2023-01-01 | Stop reason: HOSPADM

## 2023-01-01 RX ORDER — LORAZEPAM 2 MG/ML
4 INJECTION INTRAMUSCULAR
Status: DISCONTINUED | OUTPATIENT
Start: 2023-01-01 | End: 2023-01-01 | Stop reason: HOSPADM

## 2023-01-01 RX ORDER — HYDROMORPHONE HYDROCHLORIDE 2 MG/ML
8 INJECTION, SOLUTION INTRAMUSCULAR; INTRAVENOUS; SUBCUTANEOUS
Status: DISCONTINUED | OUTPATIENT
Start: 2023-01-01 | End: 2023-01-01

## 2023-01-01 RX ORDER — LORAZEPAM 2 MG/ML
4 INJECTION INTRAMUSCULAR EVERY 4 HOURS
Status: DISCONTINUED | OUTPATIENT
Start: 2023-01-01 | End: 2023-01-01 | Stop reason: HOSPADM

## 2023-01-01 RX ORDER — LORAZEPAM 2 MG/ML
2 INJECTION INTRAMUSCULAR
Status: DISCONTINUED | OUTPATIENT
Start: 2023-01-01 | End: 2023-01-01

## 2023-01-01 RX ORDER — KETOROLAC TROMETHAMINE 30 MG/ML
30 INJECTION, SOLUTION INTRAMUSCULAR; INTRAVENOUS
Status: DISPENSED | OUTPATIENT
Start: 2023-01-01 | End: 2023-01-01

## 2023-01-01 RX ORDER — LORAZEPAM 2 MG/ML
2 INJECTION INTRAMUSCULAR EVERY 4 HOURS
Status: DISCONTINUED | OUTPATIENT
Start: 2023-01-01 | End: 2023-01-01

## 2023-01-01 RX ORDER — HYDROMORPHONE HYDROCHLORIDE 2 MG/ML
4 INJECTION, SOLUTION INTRAMUSCULAR; INTRAVENOUS; SUBCUTANEOUS EVERY 4 HOURS
Status: DISCONTINUED | OUTPATIENT
Start: 2023-01-01 | End: 2023-01-01

## 2023-01-01 RX ORDER — HYDROMORPHONE HYDROCHLORIDE 2 MG/ML
10 INJECTION, SOLUTION INTRAMUSCULAR; INTRAVENOUS; SUBCUTANEOUS
Status: DISCONTINUED | OUTPATIENT
Start: 2023-01-01 | End: 2023-01-01

## 2023-01-01 RX ORDER — ACETAMINOPHEN 650 MG/1
650 SUPPOSITORY RECTAL
Status: DISCONTINUED | OUTPATIENT
Start: 2023-01-01 | End: 2023-01-01 | Stop reason: HOSPADM

## 2023-01-01 RX ORDER — HYDROMORPHONE HYDROCHLORIDE 2 MG/ML
12 INJECTION, SOLUTION INTRAMUSCULAR; INTRAVENOUS; SUBCUTANEOUS
Status: DISCONTINUED | OUTPATIENT
Start: 2023-01-01 | End: 2023-01-01 | Stop reason: HOSPADM

## 2023-01-01 RX ORDER — HYDROMORPHONE HYDROCHLORIDE 2 MG/ML
12 INJECTION, SOLUTION INTRAMUSCULAR; INTRAVENOUS; SUBCUTANEOUS EVERY 4 HOURS
Status: DISCONTINUED | OUTPATIENT
Start: 2023-01-01 | End: 2023-01-01 | Stop reason: HOSPADM

## 2023-01-01 RX ORDER — HYDROMORPHONE HYDROCHLORIDE 2 MG/ML
4 INJECTION, SOLUTION INTRAMUSCULAR; INTRAVENOUS; SUBCUTANEOUS
Status: DISCONTINUED | OUTPATIENT
Start: 2023-01-01 | End: 2023-01-01

## 2023-01-01 RX ORDER — FACIAL-BODY WIPES
10 EACH TOPICAL DAILY PRN
Status: DISCONTINUED | OUTPATIENT
Start: 2023-01-01 | End: 2023-01-01 | Stop reason: HOSPADM

## 2023-01-01 RX ORDER — HYDROMORPHONE HYDROCHLORIDE 2 MG/ML
10 INJECTION, SOLUTION INTRAMUSCULAR; INTRAVENOUS; SUBCUTANEOUS EVERY 4 HOURS
Status: DISCONTINUED | OUTPATIENT
Start: 2023-01-01 | End: 2023-01-01

## 2023-01-01 RX ORDER — PHENOBARBITAL SODIUM 65 MG/ML
65 INJECTION INTRAMUSCULAR EVERY 8 HOURS
Status: DISCONTINUED | OUTPATIENT
Start: 2023-01-01 | End: 2023-01-01 | Stop reason: HOSPADM

## 2023-01-01 RX ORDER — HYDROMORPHONE HYDROCHLORIDE 2 MG/ML
12 INJECTION, SOLUTION INTRAMUSCULAR; INTRAVENOUS; SUBCUTANEOUS
Status: DISCONTINUED | OUTPATIENT
Start: 2023-01-01 | End: 2023-01-01

## 2023-01-01 RX ORDER — HYDROMORPHONE HYDROCHLORIDE 2 MG/ML
12 INJECTION, SOLUTION INTRAMUSCULAR; INTRAVENOUS; SUBCUTANEOUS EVERY 4 HOURS
Status: DISCONTINUED | OUTPATIENT
Start: 2023-01-01 | End: 2023-01-01

## 2023-01-01 RX ORDER — GLYCOPYRROLATE 0.2 MG/ML
0.2 INJECTION INTRAMUSCULAR; INTRAVENOUS
Status: DISCONTINUED | OUTPATIENT
Start: 2023-01-01 | End: 2023-01-01 | Stop reason: HOSPADM

## 2023-01-01 RX ORDER — LORAZEPAM 2 MG/ML
4 INJECTION INTRAMUSCULAR EVERY 4 HOURS
Status: DISCONTINUED | OUTPATIENT
Start: 2023-01-01 | End: 2023-01-01

## 2023-01-01 RX ORDER — LORAZEPAM 2 MG/ML
4 INJECTION INTRAMUSCULAR
Status: DISCONTINUED | OUTPATIENT
Start: 2023-01-01 | End: 2023-01-01

## 2023-01-01 RX ADMIN — LORAZEPAM 4 MG: 2 INJECTION INTRAMUSCULAR; INTRAVENOUS at 20:14

## 2023-01-01 RX ADMIN — LORAZEPAM 4 MG: 2 INJECTION INTRAMUSCULAR; INTRAVENOUS at 23:16

## 2023-01-01 RX ADMIN — HYDROMORPHONE HYDROCHLORIDE 12 MG: 2 INJECTION INTRAMUSCULAR; INTRAVENOUS; SUBCUTANEOUS at 14:49

## 2023-01-01 RX ADMIN — LORAZEPAM 4 MG: 2 INJECTION INTRAMUSCULAR; INTRAVENOUS at 03:23

## 2023-01-01 RX ADMIN — LORAZEPAM 4 MG: 2 INJECTION INTRAMUSCULAR; INTRAVENOUS at 10:57

## 2023-01-01 RX ADMIN — LORAZEPAM 4 MG: 2 INJECTION INTRAMUSCULAR; INTRAVENOUS at 06:36

## 2023-01-01 RX ADMIN — HYDROMORPHONE HYDROCHLORIDE 12 MG: 2 INJECTION INTRAMUSCULAR; INTRAVENOUS; SUBCUTANEOUS at 22:57

## 2023-01-01 RX ADMIN — LORAZEPAM 4 MG: 2 INJECTION INTRAMUSCULAR; INTRAVENOUS at 23:11

## 2023-01-01 RX ADMIN — HYDROMORPHONE HYDROCHLORIDE 12 MG: 2 INJECTION INTRAMUSCULAR; INTRAVENOUS; SUBCUTANEOUS at 15:21

## 2023-01-01 RX ADMIN — KETOROLAC TROMETHAMINE 30 MG: 30 INJECTION, SOLUTION INTRAMUSCULAR; INTRAVENOUS at 20:06

## 2023-01-01 RX ADMIN — HYDROMORPHONE HYDROCHLORIDE 12 MG: 2 INJECTION INTRAMUSCULAR; INTRAVENOUS; SUBCUTANEOUS at 21:05

## 2023-01-01 RX ADMIN — HYDROMORPHONE HYDROCHLORIDE 8 MG: 2 INJECTION INTRAMUSCULAR; INTRAVENOUS; SUBCUTANEOUS at 20:13

## 2023-01-01 RX ADMIN — KETOROLAC TROMETHAMINE 30 MG: 30 INJECTION, SOLUTION INTRAMUSCULAR; INTRAVENOUS at 08:40

## 2023-01-01 RX ADMIN — GLYCOPYRROLATE 0.2 MG: 0.2 INJECTION INTRAMUSCULAR; INTRAVENOUS at 08:42

## 2023-01-01 RX ADMIN — PHENOBARBITAL SODIUM 65 MG: 65 INJECTION INTRAMUSCULAR at 02:49

## 2023-01-01 RX ADMIN — LORAZEPAM 4 MG: 2 INJECTION INTRAMUSCULAR; INTRAVENOUS at 12:46

## 2023-01-01 RX ADMIN — LORAZEPAM 4 MG: 2 INJECTION INTRAMUSCULAR; INTRAVENOUS at 23:29

## 2023-01-01 RX ADMIN — LORAZEPAM 4 MG: 2 INJECTION INTRAMUSCULAR; INTRAVENOUS at 10:35

## 2023-01-01 RX ADMIN — LORAZEPAM 4 MG: 2 INJECTION INTRAMUSCULAR; INTRAVENOUS at 17:49

## 2023-01-01 RX ADMIN — HYDROMORPHONE HYDROCHLORIDE 8 MG: 2 INJECTION INTRAMUSCULAR; INTRAVENOUS; SUBCUTANEOUS at 03:03

## 2023-01-01 RX ADMIN — LORAZEPAM 4 MG: 2 INJECTION INTRAMUSCULAR; INTRAVENOUS at 11:55

## 2023-01-01 RX ADMIN — LORAZEPAM 4 MG: 2 INJECTION INTRAMUSCULAR; INTRAVENOUS at 03:09

## 2023-01-01 RX ADMIN — HYDROMORPHONE HYDROCHLORIDE 10 MG: 2 INJECTION, SOLUTION INTRAMUSCULAR; INTRAVENOUS; SUBCUTANEOUS at 19:46

## 2023-01-01 RX ADMIN — HYDROMORPHONE HYDROCHLORIDE 12 MG: 2 INJECTION INTRAMUSCULAR; INTRAVENOUS; SUBCUTANEOUS at 18:36

## 2023-01-01 RX ADMIN — HYDROMORPHONE HYDROCHLORIDE 8 MG: 2 INJECTION INTRAMUSCULAR; INTRAVENOUS; SUBCUTANEOUS at 13:47

## 2023-01-01 RX ADMIN — PHENOBARBITAL SODIUM 65 MG: 65 INJECTION INTRAMUSCULAR at 18:09

## 2023-01-01 RX ADMIN — LORAZEPAM 4 MG: 2 INJECTION INTRAMUSCULAR; INTRAVENOUS at 04:58

## 2023-01-01 RX ADMIN — LORAZEPAM 4 MG: 2 INJECTION INTRAMUSCULAR; INTRAVENOUS at 23:37

## 2023-01-01 RX ADMIN — HYDROMORPHONE HYDROCHLORIDE 12 MG: 2 INJECTION INTRAMUSCULAR; INTRAVENOUS; SUBCUTANEOUS at 15:45

## 2023-01-01 RX ADMIN — LORAZEPAM 4 MG: 2 INJECTION INTRAMUSCULAR; INTRAVENOUS at 18:50

## 2023-01-01 RX ADMIN — LORAZEPAM 4 MG: 2 INJECTION INTRAMUSCULAR; INTRAVENOUS at 06:45

## 2023-01-01 RX ADMIN — LORAZEPAM 4 MG: 2 INJECTION INTRAMUSCULAR; INTRAVENOUS at 18:36

## 2023-01-01 RX ADMIN — HYDROMORPHONE HYDROCHLORIDE 8 MG: 2 INJECTION INTRAMUSCULAR; INTRAVENOUS; SUBCUTANEOUS at 06:33

## 2023-01-01 RX ADMIN — GLYCOPYRROLATE 0.2 MG: 0.2 INJECTION INTRAMUSCULAR; INTRAVENOUS at 08:29

## 2023-01-01 RX ADMIN — HYDROMORPHONE HYDROCHLORIDE 10 MG: 2 INJECTION, SOLUTION INTRAMUSCULAR; INTRAVENOUS; SUBCUTANEOUS at 23:12

## 2023-01-01 RX ADMIN — PHENOBARBITAL SODIUM 65 MG: 65 INJECTION INTRAMUSCULAR at 03:24

## 2023-01-01 RX ADMIN — HYDROMORPHONE HYDROCHLORIDE 12 MG: 2 INJECTION INTRAMUSCULAR; INTRAVENOUS; SUBCUTANEOUS at 06:36

## 2023-01-01 RX ADMIN — HYDROMORPHONE HYDROCHLORIDE 10 MG: 2 INJECTION, SOLUTION INTRAMUSCULAR; INTRAVENOUS; SUBCUTANEOUS at 04:13

## 2023-01-01 RX ADMIN — HYDROMORPHONE HYDROCHLORIDE 12 MG: 2 INJECTION INTRAMUSCULAR; INTRAVENOUS; SUBCUTANEOUS at 18:16

## 2023-01-01 RX ADMIN — PHENOBARBITAL SODIUM 65 MG: 65 INJECTION INTRAMUSCULAR at 10:02

## 2023-01-01 RX ADMIN — LORAZEPAM 4 MG: 2 INJECTION INTRAMUSCULAR; INTRAVENOUS at 02:46

## 2023-01-01 RX ADMIN — LORAZEPAM 4 MG: 2 INJECTION INTRAMUSCULAR; INTRAVENOUS at 08:27

## 2023-01-01 RX ADMIN — HYDROMORPHONE HYDROCHLORIDE 12 MG: 2 INJECTION INTRAMUSCULAR; INTRAVENOUS; SUBCUTANEOUS at 23:20

## 2023-01-01 RX ADMIN — LORAZEPAM 4 MG: 2 INJECTION INTRAMUSCULAR; INTRAVENOUS at 03:04

## 2023-01-01 RX ADMIN — LORAZEPAM 4 MG: 2 INJECTION INTRAMUSCULAR; INTRAVENOUS at 15:41

## 2023-01-01 RX ADMIN — LORAZEPAM 4 MG: 2 INJECTION INTRAMUSCULAR; INTRAVENOUS at 22:06

## 2023-01-01 RX ADMIN — HYDROMORPHONE HYDROCHLORIDE 12 MG: 2 INJECTION INTRAMUSCULAR; INTRAVENOUS; SUBCUTANEOUS at 15:40

## 2023-01-01 RX ADMIN — LORAZEPAM 4 MG: 2 INJECTION INTRAMUSCULAR; INTRAVENOUS at 23:21

## 2023-01-01 RX ADMIN — PHENOBARBITAL SODIUM 65 MG: 65 INJECTION INTRAMUSCULAR at 10:24

## 2023-01-01 RX ADMIN — HYDROMORPHONE HYDROCHLORIDE 12 MG: 2 INJECTION INTRAMUSCULAR; INTRAVENOUS; SUBCUTANEOUS at 10:57

## 2023-01-01 RX ADMIN — GLYCOPYRROLATE 0.2 MG: 0.2 INJECTION INTRAMUSCULAR; INTRAVENOUS at 03:29

## 2023-01-01 RX ADMIN — PHENOBARBITAL SODIUM 65 MG: 65 INJECTION INTRAMUSCULAR at 01:52

## 2023-01-01 RX ADMIN — LORAZEPAM 4 MG: 2 INJECTION INTRAMUSCULAR; INTRAVENOUS at 08:40

## 2023-01-01 RX ADMIN — GLYCOPYRROLATE 0.2 MG: 0.2 INJECTION INTRAMUSCULAR; INTRAVENOUS at 15:47

## 2023-01-01 RX ADMIN — LORAZEPAM 4 MG: 2 INJECTION INTRAMUSCULAR; INTRAVENOUS at 11:13

## 2023-01-01 RX ADMIN — LORAZEPAM 4 MG: 2 INJECTION INTRAMUSCULAR; INTRAVENOUS at 02:52

## 2023-01-01 RX ADMIN — GLYCOPYRROLATE 0.2 MG: 0.2 INJECTION INTRAMUSCULAR; INTRAVENOUS at 23:30

## 2023-01-01 RX ADMIN — HYDROMORPHONE HYDROCHLORIDE 8 MG: 2 INJECTION INTRAMUSCULAR; INTRAVENOUS; SUBCUTANEOUS at 23:16

## 2023-01-01 RX ADMIN — PHENOBARBITAL SODIUM 65 MG: 65 INJECTION INTRAMUSCULAR at 18:17

## 2023-01-01 RX ADMIN — HYDROMORPHONE HYDROCHLORIDE 10 MG: 2 INJECTION, SOLUTION INTRAMUSCULAR; INTRAVENOUS; SUBCUTANEOUS at 17:48

## 2023-01-01 RX ADMIN — HYDROMORPHONE HYDROCHLORIDE 12 MG: 2 INJECTION INTRAMUSCULAR; INTRAVENOUS; SUBCUTANEOUS at 03:24

## 2023-01-01 RX ADMIN — PHENOBARBITAL SODIUM 65 MG: 65 INJECTION INTRAMUSCULAR at 10:57

## 2023-01-01 RX ADMIN — GLYCOPYRROLATE 0.2 MG: 0.2 INJECTION INTRAMUSCULAR; INTRAVENOUS at 06:23

## 2023-01-01 RX ADMIN — LORAZEPAM 4 MG: 2 INJECTION INTRAMUSCULAR; INTRAVENOUS at 06:34

## 2023-01-01 RX ADMIN — LORAZEPAM 4 MG: 2 INJECTION INTRAMUSCULAR; INTRAVENOUS at 04:13

## 2023-01-01 RX ADMIN — LORAZEPAM 4 MG: 2 INJECTION INTRAMUSCULAR; INTRAVENOUS at 22:56

## 2023-01-01 RX ADMIN — PHENOBARBITAL SODIUM 65 MG: 65 INJECTION INTRAMUSCULAR at 10:36

## 2023-01-01 RX ADMIN — GLYCOPYRROLATE 0.2 MG: 0.2 INJECTION INTRAMUSCULAR; INTRAVENOUS at 15:20

## 2023-01-01 RX ADMIN — KETOROLAC TROMETHAMINE 30 MG: 30 INJECTION, SOLUTION INTRAMUSCULAR; INTRAVENOUS at 08:42

## 2023-01-01 RX ADMIN — HYDROMORPHONE HYDROCHLORIDE 12 MG: 2 INJECTION INTRAMUSCULAR; INTRAVENOUS; SUBCUTANEOUS at 06:46

## 2023-01-01 RX ADMIN — LORAZEPAM 4 MG: 2 INJECTION INTRAMUSCULAR; INTRAVENOUS at 02:49

## 2023-01-01 RX ADMIN — LORAZEPAM 4 MG: 2 INJECTION INTRAMUSCULAR; INTRAVENOUS at 15:46

## 2023-01-01 RX ADMIN — LORAZEPAM 4 MG: 2 INJECTION INTRAMUSCULAR; INTRAVENOUS at 11:30

## 2023-01-01 RX ADMIN — LORAZEPAM 4 MG: 2 INJECTION INTRAMUSCULAR; INTRAVENOUS at 13:48

## 2023-01-01 RX ADMIN — PHENOBARBITAL SODIUM 65 MG: 65 INJECTION INTRAMUSCULAR at 02:45

## 2023-01-01 RX ADMIN — GLYCOPYRROLATE 0.2 MG: 0.2 INJECTION INTRAMUSCULAR; INTRAVENOUS at 23:21

## 2023-01-01 RX ADMIN — HYDROMORPHONE HYDROCHLORIDE 12 MG: 2 INJECTION INTRAMUSCULAR; INTRAVENOUS; SUBCUTANEOUS at 23:37

## 2023-01-01 RX ADMIN — LORAZEPAM 4 MG: 2 INJECTION INTRAMUSCULAR; INTRAVENOUS at 15:33

## 2023-01-01 RX ADMIN — LORAZEPAM 4 MG: 2 INJECTION INTRAMUSCULAR; INTRAVENOUS at 06:22

## 2023-01-01 RX ADMIN — LORAZEPAM 4 MG: 2 INJECTION INTRAMUSCULAR; INTRAVENOUS at 15:21

## 2023-01-01 RX ADMIN — HYDROMORPHONE HYDROCHLORIDE 12 MG: 2 INJECTION INTRAMUSCULAR; INTRAVENOUS; SUBCUTANEOUS at 18:07

## 2023-01-01 RX ADMIN — GLYCOPYRROLATE 0.2 MG: 0.2 INJECTION INTRAMUSCULAR; INTRAVENOUS at 15:40

## 2023-01-01 RX ADMIN — HYDROMORPHONE HYDROCHLORIDE 12 MG: 2 INJECTION INTRAMUSCULAR; INTRAVENOUS; SUBCUTANEOUS at 06:26

## 2023-01-01 RX ADMIN — HYDROMORPHONE HYDROCHLORIDE 10 MG: 2 INJECTION, SOLUTION INTRAMUSCULAR; INTRAVENOUS; SUBCUTANEOUS at 22:06

## 2023-01-01 RX ADMIN — HYDROMORPHONE HYDROCHLORIDE 10 MG: 2 INJECTION, SOLUTION INTRAMUSCULAR; INTRAVENOUS; SUBCUTANEOUS at 03:09

## 2023-01-01 RX ADMIN — HYDROMORPHONE HYDROCHLORIDE 10 MG: 2 INJECTION, SOLUTION INTRAMUSCULAR; INTRAVENOUS; SUBCUTANEOUS at 08:28

## 2023-01-01 RX ADMIN — GLYCOPYRROLATE 0.2 MG: 0.2 INJECTION INTRAMUSCULAR; INTRAVENOUS at 11:30

## 2023-01-01 RX ADMIN — HYDROMORPHONE HYDROCHLORIDE 12 MG: 2 INJECTION INTRAMUSCULAR; INTRAVENOUS; SUBCUTANEOUS at 11:29

## 2023-01-01 RX ADMIN — HYDROMORPHONE HYDROCHLORIDE 12 MG: 2 INJECTION INTRAMUSCULAR; INTRAVENOUS; SUBCUTANEOUS at 02:49

## 2023-01-01 RX ADMIN — GLYCOPYRROLATE 0.2 MG: 0.2 INJECTION INTRAMUSCULAR; INTRAVENOUS at 20:02

## 2023-01-01 RX ADMIN — HYDROMORPHONE HYDROCHLORIDE 8 MG: 2 INJECTION INTRAMUSCULAR; INTRAVENOUS; SUBCUTANEOUS at 11:13

## 2023-01-01 RX ADMIN — GLYCOPYRROLATE 0.2 MG: 0.2 INJECTION INTRAMUSCULAR; INTRAVENOUS at 08:40

## 2023-01-01 RX ADMIN — HYDROMORPHONE HYDROCHLORIDE 12 MG: 2 INJECTION INTRAMUSCULAR; INTRAVENOUS; SUBCUTANEOUS at 10:22

## 2023-01-01 RX ADMIN — HYDROMORPHONE HYDROCHLORIDE 4 MG: 2 INJECTION INTRAMUSCULAR; INTRAVENOUS; SUBCUTANEOUS at 18:29

## 2023-01-01 RX ADMIN — GLYCOPYRROLATE 0.2 MG: 0.2 INJECTION INTRAMUSCULAR; INTRAVENOUS at 03:24

## 2023-01-01 RX ADMIN — KETOROLAC TROMETHAMINE 30 MG: 30 INJECTION, SOLUTION INTRAMUSCULAR; INTRAVENOUS at 11:28

## 2023-01-01 RX ADMIN — KETOROLAC TROMETHAMINE 30 MG: 30 INJECTION, SOLUTION INTRAMUSCULAR; INTRAVENOUS at 06:30

## 2023-01-01 RX ADMIN — GLYCOPYRROLATE 0.2 MG: 0.2 INJECTION INTRAMUSCULAR; INTRAVENOUS at 01:52

## 2023-01-01 RX ADMIN — LORAZEPAM 4 MG: 2 INJECTION INTRAMUSCULAR; INTRAVENOUS at 19:46

## 2023-01-01 RX ADMIN — KETOROLAC TROMETHAMINE 30 MG: 30 INJECTION, SOLUTION INTRAMUSCULAR; INTRAVENOUS at 04:18

## 2023-01-01 RX ADMIN — LORAZEPAM 4 MG: 2 INJECTION INTRAMUSCULAR; INTRAVENOUS at 18:17

## 2023-01-01 RX ADMIN — HYDROMORPHONE HYDROCHLORIDE 8 MG: 2 INJECTION INTRAMUSCULAR; INTRAVENOUS; SUBCUTANEOUS at 12:46

## 2023-01-01 RX ADMIN — PHENOBARBITAL SODIUM 65 MG: 65 INJECTION INTRAMUSCULAR at 18:38

## 2023-01-01 RX ADMIN — LORAZEPAM 4 MG: 2 INJECTION INTRAMUSCULAR; INTRAVENOUS at 10:23

## 2023-01-01 RX ADMIN — HYDROMORPHONE HYDROCHLORIDE 12 MG: 2 INJECTION INTRAMUSCULAR; INTRAVENOUS; SUBCUTANEOUS at 23:30

## 2023-01-01 RX ADMIN — HYDROMORPHONE HYDROCHLORIDE 12 MG: 2 INJECTION INTRAMUSCULAR; INTRAVENOUS; SUBCUTANEOUS at 10:35

## 2023-01-01 RX ADMIN — HYDROMORPHONE HYDROCHLORIDE 10 MG: 2 INJECTION, SOLUTION INTRAMUSCULAR; INTRAVENOUS; SUBCUTANEOUS at 15:32

## 2023-01-01 RX ADMIN — GLYCOPYRROLATE 0.2 MG: 0.2 INJECTION INTRAMUSCULAR; INTRAVENOUS at 20:06

## 2023-01-01 RX ADMIN — LORAZEPAM 4 MG: 2 INJECTION INTRAMUSCULAR; INTRAVENOUS at 18:08

## 2023-01-01 RX ADMIN — GLYCOPYRROLATE 0.2 MG: 0.2 INJECTION INTRAMUSCULAR; INTRAVENOUS at 18:49

## 2023-01-01 RX ADMIN — PHENOBARBITAL SODIUM 65 MG: 65 INJECTION INTRAMUSCULAR at 18:49

## 2023-01-01 RX ADMIN — HYDROMORPHONE HYDROCHLORIDE 12 MG: 2 INJECTION INTRAMUSCULAR; INTRAVENOUS; SUBCUTANEOUS at 18:49

## 2023-01-01 RX ADMIN — LORAZEPAM 4 MG: 2 INJECTION INTRAMUSCULAR; INTRAVENOUS at 14:50

## 2023-01-01 RX ADMIN — HYDROMORPHONE HYDROCHLORIDE 8 MG: 2 INJECTION INTRAMUSCULAR; INTRAVENOUS; SUBCUTANEOUS at 11:54

## 2023-01-01 RX ADMIN — HYDROMORPHONE HYDROCHLORIDE 12 MG: 2 INJECTION INTRAMUSCULAR; INTRAVENOUS; SUBCUTANEOUS at 02:52

## 2023-01-01 RX ADMIN — HYDROMORPHONE HYDROCHLORIDE 10 MG: 2 INJECTION, SOLUTION INTRAMUSCULAR; INTRAVENOUS; SUBCUTANEOUS at 04:57

## 2023-01-01 RX ADMIN — HYDROMORPHONE HYDROCHLORIDE 12 MG: 2 INJECTION INTRAMUSCULAR; INTRAVENOUS; SUBCUTANEOUS at 02:45

## 2023-01-01 RX ADMIN — LORAZEPAM 4 MG: 2 INJECTION INTRAMUSCULAR; INTRAVENOUS at 21:06

## 2023-01-01 RX ADMIN — HYDROMORPHONE HYDROCHLORIDE 12 MG: 2 INJECTION INTRAMUSCULAR; INTRAVENOUS; SUBCUTANEOUS at 06:22

## 2023-01-01 RX ADMIN — LORAZEPAM 4 MG: 2 INJECTION INTRAMUSCULAR; INTRAVENOUS at 06:26

## 2023-03-04 ENCOUNTER — HOSPITAL ENCOUNTER (EMERGENCY)
Age: 78
Discharge: HOME | End: 2023-03-04
Attending: EMERGENCY MEDICINE
Payer: MEDICARE

## 2023-03-04 VITALS
DIASTOLIC BLOOD PRESSURE: 75 MMHG | HEART RATE: 64 BPM | RESPIRATION RATE: 18 BRPM | HEIGHT: 66 IN | SYSTOLIC BLOOD PRESSURE: 141 MMHG | TEMPERATURE: 97.9 F | OXYGEN SATURATION: 92 % | BODY MASS INDEX: 35.36 KG/M2 | WEIGHT: 220 LBS

## 2023-03-04 DIAGNOSIS — Z51.5 ENCOUNTER FOR HOSPICE CARE: Primary | ICD-10-CM

## 2023-03-04 LAB
AMORPH CRY URNS QL MICRO: ABNORMAL
APPEARANCE UR: ABNORMAL
BACTERIA URNS QL MICRO: NEGATIVE /HPF
BILIRUB UR QL CFM: NEGATIVE
COLOR UR: ABNORMAL
EPITH CASTS URNS QL MICRO: ABNORMAL /LPF
GLUCOSE UR STRIP.AUTO-MCNC: NEGATIVE MG/DL
HGB UR QL STRIP: NEGATIVE
HYALINE CASTS URNS QL MICRO: ABNORMAL /LPF (ref 0–5)
KETONES UR QL STRIP.AUTO: 15 MG/DL
LEUKOCYTE ESTERASE UR QL STRIP.AUTO: ABNORMAL
MUCOUS THREADS URNS QL MICRO: ABNORMAL /LPF
NITRITE UR QL STRIP.AUTO: NEGATIVE
PH UR STRIP: 5.5 (ref 5–8)
PROT UR STRIP-MCNC: 300 MG/DL
RBC #/AREA URNS HPF: ABNORMAL /HPF (ref 0–5)
SP GR UR REFRACTOMETRY: 1.03 (ref 1–1.03)
SPERM URNS QL MICRO: PRESENT
UR CULT HOLD, URHOLD: NORMAL
UROBILINOGEN UR QL STRIP.AUTO: 1 EU/DL (ref 0.2–1)
WBC URNS QL MICRO: ABNORMAL /HPF (ref 0–4)

## 2023-03-04 PROCEDURE — 99284 EMERGENCY DEPT VISIT MOD MDM: CPT

## 2023-03-04 PROCEDURE — 81001 URINALYSIS AUTO W/SCOPE: CPT

## 2023-03-04 PROCEDURE — 74011250637 HC RX REV CODE- 250/637: Performed by: EMERGENCY MEDICINE

## 2023-03-04 RX ORDER — CARVEDILOL 25 MG/1
25 TABLET ORAL DAILY
Qty: 30 TABLET | Refills: 0 | Status: SHIPPED | OUTPATIENT
Start: 2023-03-04 | End: 2023-04-03

## 2023-03-04 RX ORDER — MORPHINE SULFATE 15 MG/1
15 TABLET ORAL 3 TIMES DAILY
COMMUNITY
End: 2023-03-04

## 2023-03-04 RX ORDER — MORPHINE SULFATE 20 MG/5ML
5 SOLUTION ORAL
Qty: 100 ML | Refills: 0 | Status: SHIPPED | OUTPATIENT
Start: 2023-03-04 | End: 2023-03-07

## 2023-03-04 RX ORDER — FACIAL-BODY WIPES
10 EACH TOPICAL
Qty: 30 SUPPOSITORY | Refills: 0 | Status: SHIPPED | OUTPATIENT
Start: 2023-03-04 | End: 2023-04-03

## 2023-03-04 RX ORDER — BENAZEPRIL HYDROCHLORIDE 10 MG/1
40 TABLET ORAL DAILY
Qty: 120 TABLET | Refills: 0 | Status: SHIPPED | OUTPATIENT
Start: 2023-03-04 | End: 2023-04-03

## 2023-03-04 RX ORDER — ATROPINE SULFATE 10 MG/ML
2 SOLUTION/ DROPS OPHTHALMIC
COMMUNITY
End: 2023-03-04

## 2023-03-04 RX ORDER — ACETAMINOPHEN 325 MG/1
650 TABLET ORAL
Qty: 20 TABLET | Refills: 0 | Status: SHIPPED | OUTPATIENT
Start: 2023-03-04

## 2023-03-04 RX ORDER — FENTANYL 75 UG/H
1 PATCH TRANSDERMAL
Qty: 5 PATCH | Refills: 0 | Status: SHIPPED | OUTPATIENT
Start: 2023-03-04 | End: 2023-03-19

## 2023-03-04 RX ORDER — MORPHINE SULFATE 15 MG/1
30 TABLET ORAL 3 TIMES DAILY
Qty: 18 TABLET | Refills: 0 | Status: SHIPPED | OUTPATIENT
Start: 2023-03-04 | End: 2023-03-07

## 2023-03-04 RX ORDER — LORAZEPAM 2 MG/ML
0.5 CONCENTRATE ORAL
COMMUNITY
End: 2023-03-04

## 2023-03-04 RX ORDER — ACETAMINOPHEN 650 MG/1
650 SUPPOSITORY RECTAL
COMMUNITY
End: 2023-03-04

## 2023-03-04 RX ORDER — TAMSULOSIN HYDROCHLORIDE 0.4 MG/1
0.4 CAPSULE ORAL DAILY
COMMUNITY
End: 2023-03-04

## 2023-03-04 RX ORDER — HALOPERIDOL 2 MG/ML
2 SOLUTION ORAL
Qty: 30 ML | Refills: 0 | Status: SHIPPED | OUTPATIENT
Start: 2023-03-04

## 2023-03-04 RX ORDER — SERTRALINE HYDROCHLORIDE 25 MG/1
75 TABLET, FILM COATED ORAL DAILY
Qty: 90 TABLET | Refills: 0 | Status: SHIPPED | OUTPATIENT
Start: 2023-03-04 | End: 2023-04-03

## 2023-03-04 RX ORDER — MORPHINE SULFATE 15 MG/1
30 TABLET ORAL
Status: DISCONTINUED | OUTPATIENT
Start: 2023-03-04 | End: 2023-03-05 | Stop reason: HOSPADM

## 2023-03-04 RX ORDER — DIVALPROEX SODIUM 250 MG/1
250 TABLET, DELAYED RELEASE ORAL 2 TIMES DAILY
COMMUNITY
Start: 2023-01-31 | End: 2023-03-04

## 2023-03-04 RX ORDER — ZOLPIDEM TARTRATE 5 MG/1
5 TABLET ORAL
Qty: 10 TABLET | Refills: 0 | Status: SHIPPED | OUTPATIENT
Start: 2023-03-04

## 2023-03-04 RX ORDER — PROCHLORPERAZINE MALEATE 10 MG
5 TABLET ORAL
COMMUNITY
End: 2023-03-04

## 2023-03-04 RX ORDER — HYOSCYAMINE SULFATE 0.12 MG/1
0.12 TABLET SUBLINGUAL
COMMUNITY
End: 2023-03-04

## 2023-03-04 RX ORDER — MORPHINE SULFATE 30 MG/1
30 TABLET, FILM COATED, EXTENDED RELEASE ORAL
Status: DISCONTINUED | OUTPATIENT
Start: 2023-03-04 | End: 2023-03-04

## 2023-03-04 RX ORDER — HYDRALAZINE HYDROCHLORIDE 25 MG/1
25 TABLET, FILM COATED ORAL
Qty: 20 TABLET | Refills: 0 | Status: SHIPPED | OUTPATIENT
Start: 2023-03-04

## 2023-03-04 RX ORDER — AMLODIPINE BESYLATE 10 MG/1
10 TABLET ORAL DAILY
Qty: 20 TABLET | Refills: 0 | Status: SHIPPED | OUTPATIENT
Start: 2023-03-04 | End: 2023-03-24

## 2023-03-04 RX ORDER — HALOPERIDOL 1 MG/1
2 TABLET ORAL
Status: COMPLETED | OUTPATIENT
Start: 2023-03-04 | End: 2023-03-04

## 2023-03-04 RX ORDER — LORAZEPAM 0.5 MG/1
0.5 TABLET ORAL
Qty: 10 TABLET | Refills: 0 | Status: SHIPPED | OUTPATIENT
Start: 2023-03-04

## 2023-03-04 RX ORDER — LORAZEPAM 0.5 MG/1
0.5 TABLET ORAL
COMMUNITY
End: 2023-03-04

## 2023-03-04 RX ORDER — SERTRALINE HYDROCHLORIDE 50 MG/1
75 TABLET, FILM COATED ORAL DAILY
COMMUNITY
End: 2023-03-04

## 2023-03-04 RX ORDER — FENTANYL 75 UG/H
1 PATCH TRANSDERMAL
COMMUNITY
End: 2023-03-04

## 2023-03-04 RX ORDER — LORAZEPAM 2 MG/ML
1 CONCENTRATE ORAL
Qty: 30 ML | Refills: 0 | Status: SHIPPED | OUTPATIENT
Start: 2023-03-04

## 2023-03-04 RX ORDER — HALOPERIDOL 2 MG/ML
2 SOLUTION ORAL EVERY 6 HOURS
COMMUNITY
Start: 2023-03-02 | End: 2023-03-04

## 2023-03-04 RX ORDER — FACIAL-BODY WIPES
10 EACH TOPICAL
COMMUNITY
End: 2023-03-04

## 2023-03-04 RX ORDER — ZOLPIDEM TARTRATE 10 MG/1
10 TABLET ORAL
COMMUNITY
End: 2023-03-04

## 2023-03-04 RX ORDER — TAMSULOSIN HYDROCHLORIDE 0.4 MG/1
0.4 CAPSULE ORAL
Qty: 30 CAPSULE | Refills: 0 | Status: SHIPPED | OUTPATIENT
Start: 2023-03-04 | End: 2023-04-03

## 2023-03-04 RX ORDER — CLONIDINE HYDROCHLORIDE 0.1 MG/1
0.1 TABLET ORAL 3 TIMES DAILY
Qty: 90 TABLET | Refills: 0 | Status: SHIPPED | OUTPATIENT
Start: 2023-03-04 | End: 2023-04-03

## 2023-03-04 RX ORDER — CLONIDINE HYDROCHLORIDE 0.1 MG/1
0.1 TABLET ORAL 3 TIMES DAILY
COMMUNITY
Start: 2023-02-19 | End: 2023-03-04

## 2023-03-04 RX ORDER — HYOSCYAMINE SULFATE 0.12 MG/1
0.12 TABLET SUBLINGUAL
Qty: 30 TABLET | Refills: 0 | Status: SHIPPED | OUTPATIENT
Start: 2023-03-04

## 2023-03-04 RX ORDER — ATROPINE SULFATE 10 MG/ML
1 SOLUTION/ DROPS OPHTHALMIC
Qty: 5 ML | Refills: 0 | Status: SHIPPED | OUTPATIENT
Start: 2023-03-04

## 2023-03-04 RX ORDER — PROCHLORPERAZINE MALEATE 5 MG
10 TABLET ORAL
Qty: 20 TABLET | Refills: 0 | Status: SHIPPED | OUTPATIENT
Start: 2023-03-04 | End: 2023-03-11

## 2023-03-04 RX ORDER — DIVALPROEX SODIUM 250 MG/1
250 TABLET, DELAYED RELEASE ORAL 2 TIMES DAILY
Qty: 60 TABLET | Refills: 0 | Status: SHIPPED | OUTPATIENT
Start: 2023-03-04 | End: 2023-04-03

## 2023-03-04 RX ORDER — BENAZEPRIL HYDROCHLORIDE 40 MG/1
40 TABLET ORAL DAILY
COMMUNITY
Start: 2023-02-23 | End: 2023-03-04

## 2023-03-04 RX ORDER — MORPHINE SULFATE 20 MG/5ML
5 SOLUTION ORAL
COMMUNITY
End: 2023-03-04

## 2023-03-04 RX ADMIN — MORPHINE SULFATE 30 MG: 30 TABLET, FILM COATED, EXTENDED RELEASE ORAL at 15:32

## 2023-03-04 RX ADMIN — HALOPERIDOL 2 MG: 1 TABLET ORAL at 15:32

## 2023-03-04 NOTE — ED NOTES
Spoke with Arya Wyman at [place] [facility] who states that the pt is not long going to their facility due to the hospice company not having a contract with them. Arya Wyman states they were told Ilda Sierra the \". [facility] at bedside for pt transport. Willing to hold tight for a few to decide where pt is going to be transported to. Awaiting to speak with  at this time for further information. Attempted to call  number and received voicemail. Ed md and charge rn notified.

## 2023-03-04 NOTE — ED TRIAGE NOTES
Patient presents to the ED today with chief complaint of:    Arrives EMS for complaint of constipation, and altered mental status. . Comes from his house, is a hospice patient. Arrives with a DNR.       Capri Gutierrez RN

## 2023-03-04 NOTE — ED NOTES
Ems reports family has concerns regarding current hospice care and would like to switch hospice companies. RN called case management and was advised to give EMS case management's number. RN wrote case management's number down and gave to EMS.  Primary RN notified

## 2023-03-04 NOTE — PROGRESS NOTES
4:13 PM  Spoke with physician who confirms medically stable to discharge to a respite tonight, after obtaining urinalysis and adding treatment if needed. [facility] has a bed and Hospital to Home wheelchair Bhupendra Philip has been arranged for a 6pm pickup per discussion with physician. Liaison with [facility] has sent their notes over to the Admissions Dept who have confirmed they can accept patient. If [facility] needs to send them a new contract they will do that as well. [facility] to connect with [facility] moving forward. Report needs to be called to [facility] at [phone]    3:06 PM  Call received at 2:30 from ED that the EMS driver needed to discuss patient and situation. EMS conference in patient's daughter, Holger Simons, ([phone]) who is a nurse and has been caring for her Father with the support of [facility] since October (he moved in with her in July ) and the management of his behaviors has become more than she can manage right now. Dr Chuyita Jordan referred them to ED, and she was asking about [address]. Call placed to Liaison with [facility] at [phone] who confirms they have been working with this family this past week regarding short term placement but it was not something they were able to do at the time. Now that family is receptive to short term respite, she suggested some Snf facilities they contract with, as patient does not meet criteria for [facility]/ not bedbound/ not end of life. Reviewed with Kvng Tyson the above and she is open to respite at [facility], has a friend who is the Admissions Director, Marilee De Luna, and this CM has reached out to the [address] who is checking bed availability for a gentleman.

## 2023-03-04 NOTE — ED NOTES
Attempted to call report to [facility]. Was transferred 2 x and received an answering service.

## 2023-03-04 NOTE — ED NOTES
Spoke with Chris from case management. Pt will be going to [facility] room 122. [phone]. Judy to call family and notify of change. H notified of changed and pt has been discharged in their care to the facility at this time. No change in pt status. Attempted to call report to facility at this time. Was transferred x2 with no answer.

## 2023-03-04 NOTE — ED PROVIDER NOTES
Has been losing sleep since his wife dies last month. Has been in a decline for last 2 months. On hospice for RCC. Unstaged. Attempted agitation control at home. The history is provided by the EMS personnel, a relative and a caregiver. Altered mental status   This is a chronic problem. The current episode started more than 1 week ago. The problem has been gradually worsening. Associated symptoms include confusion, somnolence and agitation. Mental status baseline is severe dementia. Functional status baseline:  [EPIC#1537^NOTE} Risk factors include dementia. His past medical history is significant for psychotropic medication treatment.       Past Medical History:   Diagnosis Date    Chronic kidney disease     Stage 2    Diabetes mellitus with stage 2 chronic kidney disease (HCC)     High cholesterol     Morbid obesity with BMI of 40.0-44.9, adult ([facility] [address]     MRSA infection     Abcess on abdomen    Sleep apnea with use of continuous positive airway pressure (CPAP)     Tinnitus aurium, bilateral        Past Surgical History:   Procedure Laterality Date    HX CERVICAL FUSION N/A     HX CHOLECYSTECTOMY      HX COLONOSCOPY N/A     HX CYST INCISION AND DRAINAGE N/A     Cyst on abdomen    HX LUMBAR FUSION N/A 2019    L 4-S1 Lami & L4-5 Fusion    HX TONSILLECTOMY N/A          Family History:   Problem Relation Age of Onset    Pneumonia Mother     Heart Attack Father 45    Heart Surgery Sister 76        CABG    No Known Problems Sister        Social History     Socioeconomic History    Marital status:      Spouse name: Not on file    Number of children: Not on file    Years of education: Not on file    Highest education level: Not on file   Occupational History    Not on file   Tobacco Use    Smoking status: Former     Packs/day: 1.50     Years: 20.00     Pack years: 30.00     Types: Cigarettes     Quit date: 0     Years since quittin.1    Smokeless tobacco: Never   Substance and Sexual Activity    Alcohol use: Yes     Alcohol/week: 5.0 standard drinks     Types: 1 Glasses of wine, 2 Cans of beer, 2 Shots of liquor per week    Drug use: No    Sexual activity: Not on file   Other Topics Concern    Not on file   Social History Narrative    Not on file     Social Determinants of Health     Financial Resource Strain: Not on file   Food Insecurity: Not on file   Transportation Needs: Not on file   Physical Activity: Not on file   Stress: Not on file   Social Connections: Not on file   Intimate Partner Violence: Not on file   Housing Stability: Not on file         ALLERGIES: Patient has no known allergies. Review of Systems   Psychiatric/Behavioral:  Positive for agitation and confusion. Vitals:    03/04/23 1440   BP: 121/75   Pulse: (!) 57   Resp: 18   Temp: 97.9 °F (36.6 °C)   SpO2: 93%   Weight: 99.8 kg (220 lb)   Height: 5' 6\" (1.676 m)            Physical Exam  Vitals and nursing note reviewed. Constitutional:       General: He is not in acute distress. Appearance: He is well-developed. HENT:      Head: Normocephalic and atraumatic. Eyes:      Conjunctiva/sclera: Conjunctivae normal.   Neck:      Trachea: No tracheal deviation. Cardiovascular:      Rate and Rhythm: Normal rate and regular rhythm. Pulmonary:      Effort: Pulmonary effort is normal. No respiratory distress. Abdominal:      General: There is no distension. Musculoskeletal:         General: No deformity. Normal range of motion. Cervical back: Neck supple. Skin:     General: Skin is warm and dry. Neurological:      Mental Status: He is alert. He is disoriented. Cranial Nerves: No cranial nerve deficit. Psychiatric:         Behavior: Behavior is not agitated. Cognition and Memory: Cognition is impaired. Comments: At baseline        Medical Decision Making  70 y.o. male presents with inability to sleep on large doses of narcotic pain medication, antipsychotics and anxiolytics. He is on hospice therapy and family felt unable to care for him further and sent him here for help with respite placement. I spoke with our  and hospice provider. CM to arrange placement in SNF for temporary stay until definitive disposition obtained. Family requested UA to see if he had UTI and there is no evidence. Will discharge to care of hospice. Problems Addressed:  Encounter for hospice care: acute illness or injury    Amount and/or Complexity of Data Reviewed  Labs: ordered. Decision-making details documented in ED Course. Risk  Prescription drug management. Drug therapy requiring intensive monitoring for toxicity.            Procedures

## 2023-03-05 NOTE — ED NOTES
called back at this time stating hat pts daughter does not wish for him to go to [facility]. Charge rn went outside to stop pt from leaving with h2h    1855 pt placed back in room 6 in ed to await placement.

## 2023-03-05 NOTE — ED NOTES
[facility] here to transport pt to [place]. No change in pt status.  Pt discharged in care of [facility]

## 2023-03-05 NOTE — ED NOTES
Pt provided lean cuisine at this time. Pt sitting up in recliner in a position of comfort. Resps are even and unlabored. Skin warm and dry. No distress noted.

## 2023-03-05 NOTE — PROGRESS NOTES
03/04/23 8:33 PM  Fax confirmation received. Hard scripts also sent to Admissions Director Deisy Kidd via secure e-mail to [email or web address].    03/04/23 7:54 PM  Received confirmation from Deisy Kidd that patient can be admitted to [facility] under comfort and they can administer all medications. Deisy Kidd requesting hard scripts for all medications. Called daughter and corroborated with RN to confirm list of medications. Facundougher comfortable with plan. She will meet patient at [facility]. Provider writing hard scripts. Hard scripts faxed to [facility] at [phone] and also added to packet for transport. Transport with [facility] arranged for 8:30PM, daughter made aware. RN also aware of plan. 03/04/23 7:12 PM   Received call from ED that [facility] is declining patient via telephone when RN was attempting to give report. Issues with contract with hospice. Contacted Lynn Huizar, liaison with [facility], to discuss issue. James Raza stated that she was under the impression that [facility] was signing a one time contract with [facility], which is the hospice agency of choice for the patient. James Raza stated that she was calling a colleague and would call me back. Received call back from James Person. She stated that [facility]  declined to sign the contract with [facility]. Inquired about communication of this change. James Raza stated that this is new information and admitted that it had not yet been communicated with [facility] CM or patient's family. James Raza stated that patient could come to [facility], a [facility] facility, with [facility], room 122. This information was communicated to RN. CM also reached out to patient's daughter Radha Green to communicate the change. Radha Green stated that she did not want her father to go to [facility]. CM contacted RN back to stop transport from leaving with the patient. Patient returned to ED room.     CM contacted daughter back and discussed options:   Patient remain under [facility] and return home. Patient remain under [facility] and go to another SNF that has a contract with [facility]. Daughter's additional SNF choices are [facility] or [facility]. Unsure at this time if they have a contract with [facility]. Revoke [facility] and go to [place] with a contracted hospice agency  Revoke [facility] and go to [place] under comfort care/Medicare days    Daughter explained that location is most important to her and [place] is her first choice for location. Daughter explained that patient has simply not been successful with hospice at home. Between 2 options utilizing hospice, daughter is wanting to have patient go to [place] under comfort care using his Medicare days. Mostly concerned that medications can be administered to keep him most comfortable. Contacted Dedra Cherry back to inquire about medications. Advised to contact Admissions Director Dorene at [place]. Spoke with Hoang Harrison via telephone. Hoang Harrison currently reviewing patient's medication list to confirm that necessary medications can be administered and that they have those medications in house. Will call me back shortly with confirmation. Once confirmation received, will notify daughter and set up transportation.     CASSANDRA Jovel

## 2023-03-05 NOTE — ED NOTES
Report was called to Valarie Platt, [address] [place] [address] at [place]. No change in pt status.

## 2023-03-14 NOTE — HOSPICE
1735  Patient arrived to [facility], room #10 via hospital to home stretcher. 1745  Patient having moaning respirations, cyanotic bilateral feet, around lips and nose. Non verbal.  Old fentanyl 12.5 mcg fentanyl patch found in patient's right thigh. Dated 3/9. Right upper arm (2) 12.5 mcg and (1) 75 mcg fentanyl patches found undated. Lungs with coarse labored respirations. Large round abdomin with (-) BS. Bilateral lower shins with shiny taunt skin. Positions with head of bed elevated for comfort. [facility] to provide update on patients arrival.  Nurse Colt Gross stated she is stuck in traffic and will get here as soon as possible. 5704  Dr. Angelita Lennon rounding bedside. 1810  #16 FR ferguson catheter placed with out difficulty. 500 cc dark melonie urine returned. Removed all fentanyl patches and wasted with Rossana Workman RN. #22 SQ site placed right thigh and upper arm. Sacral area clean dry and intact without any redness or breakdown. Under abdominal fold skin moist and pink/ red. Protective cream in place. [address] patient supine with pillows for comfort. 1830  medicated with prn Dilaudid for severe restlessness dyspnea and facial grimaces  1855  patient resting with less respiratory distress and facial grimaces. 8333 Jennifer Olivas at bedside. 1905  report to be given to on coming nurse.

## 2023-03-14 NOTE — H&P
[facility] Help to Those in Need  [phone]    Patient Name: Roddy Caballero  YOB: 1945    Date of Provider Hospice Visit: 03/14/23    Level of Care:   [x] General Inpatient (GIP)    [] Routine   [] Respite    Current Location of Care:  [] [facility] [] [facility] [] [address] [] [facility] [x] Hospice House [facility], patient referred from:  [] [facility] [] [facility] [] [address] [] [facility] [x] Home [] Other:       Principle Hospice Diagnosis: Metastatic renal cell cancer  Diagnoses RELATED to the terminal prognosis: Cancer associated pain, probable terminal delirium  Other Diagnoses:      Liza Nowak is a 70y.o. year old who was admitted to [facility]. Patient is a 74-year-old male being followed by [facility] secondary to metastatic renal cell cancer. Patient apparently had a very difficult week as I reviewed the chart sent by KIRSTEN HOGUE as well as an emergency room visit on 3/4/2023. Patient apparently is having significant increasing pain issues despite multiple medication adjustments. In reviewing the chart, patient had been on MS Contin 30 mg 3 times a day, fentanyl 75 mcg every 72 hours, along with morphine 5 mg every hour as needed for breakthrough pain. It appears on 3/10/2023, medications were adjusted to include fentanyl 100 mcg every 72 hours, methadone 10 mg 3 times a day and Dilaudid 2 mg every 2 hours as needed for breakthrough pain. Despite this adjustment, apparently patient remained in crisis with both pain and restlessness at home. Patient subsequently sent to the community hospice house for [facility] level care.   There is no family present but apparently daughter was caring for him and understands that he is transitioning towards end of life and wants to make sure he remains comfortable    The patient's principle diagnosis has resulted in pain, agitation  Refer to LCD     Functionally, the patient's Karnofsky and/or Palliative Performance Scale has declined over a period of weeks and is estimated at 10. The patient is dependent on the following ADLs:    Objective information that support this patients limited prognosis includes: See note above. The patient/family chose comfort measures with the support of Hospice. HOSPICE DIAGNOSES   Active Symptoms:  1. Cancer associated pain  2. Suspect terminal delirium/agitation  3. 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 Hospice care     PLAN   Patient admitted GIP [facility] care as he needs frequent nursing assessments, medication adjustments likely via IV/subcutaneous route as patient pain not managed well in the home setting despite multiple attempts at adjustment of medication. Pain management-based on fentanyl 100 mcg/h transdermal, Dilaudid 2 mg every hour as needed for breakthrough pain, methadone 10 mg 3 times daily. Fentanyl 100 mcg/h equals approximately 10 mg/h morphine which equals approximately 2 mg/h of Dilaudid. Methadone 10 mg 3 times daily is approximately 300 mg morphine equivalent which would equal approximately 15 mg IV Dilaudid per 24 hours. This equals approximately 63 mg per 24 hours of Dilaudid and if he divided this by 6 would equal approximately 10 mg every 4 hours. Given incomplete cross tolerance as well as difficult to know how much absorption was actually occurring, we will schedule Dilaudid 4 mg every 4 hours subcutaneous/IV and every 15 minutes as needed for pain/shortness of breath. We may need to adjust this further but I think a reasonable starting point  Addendum-I talked with patient's daughter Arsen Tidwell who has been caring for him. Patient actually requiring much more medication than what was sent to us by the hospice team.  She was having to give him 100 mg morphine every 2 hours, Dilaudid 12 mg every 1-2 hours, Ativan 4 mg every 2-3 hours, methadone got some of that in today at 20 mg 3 times a day in addition to his fentanyl transdermal 100 mcg/h.   Based on the math calculations without including the methadone, patient was receiving 144 IV mg equivalent Dilaudid IV-this is based on 1200 mg morphine,(60 mg IV dilaudid) 144 mg Dilaudid orally(36 mg IV), fentanyl transdermal 100 mcg(48 mg dilaudid). This calculates to 24 mg every 4 hours IV-if you calculate for incomplete cross tolerance and lower by 50%, this would be approximately 12 mg every 4 hours. Patient's daughter does state that he did have some vomiting so I am a little concerned that he was not receiving all the medication or absorbing it but also I did not even calculate in the methadone. I think it safe to start Dilaudid 8 mg subcutaneous every 4 hours and every 15 minutes as needed based on what patient's daughter tells me he was receiving at home. We also increase the Ativan to 4 mg every 4 hours as he has been receiving that at home. Very difficult situation and she states she was struggling managing his symptoms and had never seen anybody struggle so much. Patient's daughter is a nurse. She definitely needed a break as well and we told her we would call her if we saw any further changes. She does state patient appeared to have a cardiac event earlier today as he was complaining of some chest pain as well. Patient has a history of chronic opioid use as well. Obviously, these are doses much higher than we typically would start but I do think appropriate given what he already been receiving at home.   Terminal delirium/agitation-once again if there is a component of opioid induced neuro excitability, really cannot stop the opioids but we will rotate strictly to Dilaudid but also start scheduled Ativan 2 mg every 4 hours and every 15 minutes as needed for restlessness  Comfort meds for all other symptoms  Plan reviewed with bedside nursing team and we will review the plan with [facility] nurse when they arrive  We will update patient's daughter  Patient does appear to be approaching end-of-life as he is restless, cyanotic     and SW to support family needs  Disposition: Death at the hospice house  Hospice Plan of care was reviewed in detail and agree with current plan of care    Prognosis estimated based on 03/14/23 clinical assessment is:   [x] Hours to Days    [] Days to Weeks    [] Other:    Communicated plan of care with: Hospice Case Manager; Hospice IDT; Care Team     GOALS OF CARE     Patient/Medical POA stated Goal of Care: Hospice care    [x] I have reviewed and/or updated ACP information in the Advance Care Planning Navigator. This information is available in the [address] link in the patient's chart header.     Primary Decision Maker [facility] Agent):   Primary Decision Maker: Mirtha Olivera - Daughter - [phone]    Secondary Decision Maker: Clarence Cortes (stepdtr) - Child - [phone]    Resuscitation Status: DNR  If DNR is there a Durable DNR on file? : [x] Yes [] No (If no, complete Durable DNR)    HISTORY     History obtained from: Chart, daughter, son Hesham Croatian: Generalized pain  The patient is:   [x] Verbal  [] Nonverbal  [] Unresponsive    HPI/SUBJECTIVE: Patient really cannot verbalize much but definitely significant moaning especially with movement       REVIEW OF SYSTEMS     The following systems were: [] reviewed  [x] unable to be reviewed    Positive ROS include:  Constitutional: fatigue, weakness, in pain, short of breath  Ears/nose/mouth/throat: increased airway secretions  Respiratory:shortness of breath, wheezing  Gastrointestinal:poor appetite, nausea, vomiting, abdominal pain, constipation, diarrhea  Musculoskeletal:pain, deformities, swelling legs  Neurologic:confusion, hallucinations, weakness  Psychiatric:anxiety, feeling depressed, poor sleep  Endocrine:     Adult Non-Verbal Pain Assessment Score: 6    Face  [] 0   No particular expression or smile  [] 1   Occasional grimace, tearing, frowning, wrinkled forehead  [x] 2   Frequent grimace, tearing, frowning, wrinkled forehead    Activity (movement)  [] 0   Lying quietly, normal position  [] 1   Seeking attention through movement or slow, cautious movement  [x] 2   Restless, excessive activity and/or withdrawal reflexes    Guarding  [] 0   Lying quietly, no positioning of hands over areas of body  [x] 1   Splinting areas of the body, tense  [] 2   Rigid, stiff    Physiology (vital signs)  [x] 0   Stable vital signs  [] 1   Change in any of the following: SBP > 20mm Hg; HR > 20/minute  [] 2   Change in any of the following: SBP > 30mm Hg; HR > 25/minute    Respiratory  [] 0   Baseline RR/SpO2, compliant with ventilator  [x] 1   RR > 10 above baseline, or 5% drop SpO2, mild asynchrony with ventilator  [] 2   RR > 20 above baseline, or 10% drop SpO2, asynchrony with ventilator     FUNCTIONAL ASSESSMENT     Palliative Performance Scale (PPS): 10       PSYCHOSOCIAL/SPIRITUAL ASSESSMENT     Active Problems:    * No active hospital problems. *    Past Medical History:   Diagnosis Date    Chronic kidney disease     Stage 2    Diabetes mellitus with stage 2 chronic kidney disease (HCC)     High cholesterol     Morbid obesity with BMI of 40.0-44.9, adult ([facility] [address]     MRSA infection     Abcess on abdomen    Sleep apnea with use of continuous positive airway pressure (CPAP)     Tinnitus aurium, bilateral       Past Surgical History:   Procedure Laterality Date    HX CERVICAL FUSION N/A     HX CHOLECYSTECTOMY      HX COLONOSCOPY N/A     HX CYST INCISION AND DRAINAGE N/A     Cyst on abdomen    HX LUMBAR FUSION N/A 2019    L 4-S1 Lami & L4-5 Fusion    HX TONSILLECTOMY N/A       Social History     Tobacco Use    Smoking status: Former     Packs/day: 1.50     Years: 20.00     Pack years: 30.00     Types: Cigarettes     Quit date:      Years since quittin.2    Smokeless tobacco: Never   Substance Use Topics    Alcohol use:  Yes     Alcohol/week: 5.0 standard drinks     Types: 1 Glasses of wine, 2 Cans of beer, 2 Shots of liquor per week     Family History   Problem Relation Age of Onset    Pneumonia Mother     Heart Attack Father 45    Heart Surgery Sister 76        CABG    No Known Problems Sister       No Known Allergies   Current Facility-Administered Medications   Medication Dose Route Frequency    HYDROmorphone (DILAUDID) injection 4 mg  4 mg SubCUTAneous Q4H    HYDROmorphone (DILAUDID) injection 4 mg  4 mg SubCUTAneous Q15MIN PRN    LORazepam (ATIVAN) injection 2 mg  2 mg SubCUTAneous Q4H    LORazepam (ATIVAN) injection 2 mg  2 mg SubCUTAneous Q15MIN PRN    bisacodyL (DULCOLAX) suppository 10 mg  10 mg Rectal DAILY PRN    acetaminophen (TYLENOL) suppository 650 mg  650 mg Rectal Q4H PRN    ketorolac (TORADOL) injection 30 mg  30 mg IntraMUSCular Q6H PRN    glycopyrrolate (ROBINUL) injection 0.2 mg  0.2 mg IntraVENous Q4H PRN        PHYSICAL EXAM     Wt Readings from Last 3 Encounters:   03/04/23 99.8 kg (220 lb)   10/21/22 99.8 kg (220 lb)   01/07/19 120.9 kg (266 lb 8.6 oz)       Visit Vitals  /63 (BP 1 Location: Right lower arm, BP Patient Position: Lying; At rest;Supine)   Pulse 86   Temp 98 °F (36.7 °C)   SpO2 (!) 65%       Supplemental O2  [] Yes  [x] NO  Last bowel movement:     Currently this patient has:  [] Peripheral IV [] PICC  [] PORT [] ICD    [x] Grant Catheter [] NG Tube   [] PEG Tube    [] Rectal Tube [] Drain  [x] Other: Subcutaneous    Constitutional: Ill-appearing, attempts to talk but appears uncomfortable, ashen, slightly cyanotic around his mouth/lips  Eyes: Closed  ENMT: Dry  Cardiovascular: Tachycardic  Respiratory: Slight increased work of breathing  Gastrointestinal: Soft  Musculoskeletal: Unremarkable  Skin: Slightly cool extremities  Neurologic: Moving both upper and lower extremities  Psychiatric: Very restless  Other:       Pertinent Lab and or Imaging Tests:  Lab Results   Component Value Date/Time    Sodium 140 10/25/2022 04:14 AM    Potassium 3.7 10/25/2022 04:14 AM    Chloride 108 10/25/2022 04:14 AM    CO2 27 10/25/2022 04:14 AM    Anion gap 5 10/25/2022 04:14 AM    Glucose 153 (H) 10/25/2022 04:14 AM    BUN 27 (H) 10/25/2022 04:14 AM    Creatinine 1.25 10/25/2022 04:14 AM    BUN/Creatinine ratio 22 (H) 10/25/2022 04:14 AM    GFR est AA >60 01/11/2019 06:07 AM    GFR est non-AA >60 01/11/2019 06:07 AM    Calcium 8.6 10/25/2022 04:14 AM     Lab Results   Component Value Date/Time    Protein, total 7.2 10/21/2022 11:17 AM    Albumin 3.4 (L) 10/21/2022 11:17 AM           Total time:   Counseling / coordination time:   > 50% counseling / coordination?:

## 2023-03-15 NOTE — PROGRESS NOTES
[id number] OF PATIENT:  Leonard Rivera    LEVEL OF CARE:  [facility]    REASON FOR GIP:   Pain, despite numerous changes in medications, Unmanageable respiratory distress, and Stabilizing treatment that cannot take place at home    *PATIENT REMAINS ELIGIBLE FOR [facility] LEVEL OF CARE AS EVIDENCED BY: Need for frequent monitoring and IV medications to provide comfort  O2 SAFETY:  On Room Air    FALL INTERVENTIONS PROVIDED:   Implemented/recommended resources for alarm system (personal alarm, bed alarm, call bell, etc.)  and Implemented/recommended increased supervision/assistance    INTERDISPLINARY COMMUNICATION/COLLABORATION:  Physician, CASSANDRA, Stewart Zafar, and RN, CNA    NEW MEDICATION INITIATION DOCUMENTATION:  N/A    Reason medication is being initiated:  N/A    MD / Provider name consulted re: change in status / initiation of new medication:  N/A    New Symptom(s):  N/A    New Order(s):  N/A    Name of the person notified of the changes:  SOLOMON Marc    Name of person being taught:  Jodi Acevedo    Instructions given:  Reviewed medications and update on condition    Side Effects taught:  N/A    Response to teaching:  Understanding and very much concerned her father is comfortable      COMFORTABLE DYING MEASURE:  Is Patient/family satisfied with symptom level?  yes    DISCHARGE PLAN:  Likely end of life  2021  PA completed. Scheduled medications given s/p starting PIV R Ac. Client groans and grimaces to touch but non verbal and did not open eyes. Moves L arm inwards when touched. NO movement noted of other extremities. Very  pale presentation. Respirations appear unlabored. Will continue to monitor  2036  DTBABS Marc called and reviewed medications. Discussed his significant pain level prior to admit here and availability of PRN medications and I would call  if these needed to be adjusted. Reviewed vitals signs and current appearance of comfort other than with movement of L arm.   She plans to visit in am  2132  Resting quietly with even unlabored respirations  2325  Scheduled medications as per MARs. Client positioned on R side. Grimace and groan some with position changed but calmed immediately and appears relaxed. Will Moved R arm as earlier had appeared flaccid. Drawing in as he does with L arm. Continuing to monitor  0016  Resting quietly with eyes closed, unlabored respirations and relaxed facial and extremity muscles  0057  Short apneic period noted otherwise respirations unlabored, facial and extremity muscles relaxed  0202  Resting quietly with RR of 16. No extremity muscles guarding and face relaxed  0308 Resting quietly with some increase in coarseness to respirations. Respirations unlabored. Facial and extremity muscles relaxed. Continuing to monitor  0405  Unlabored respirations noted. Facial and extremity muscles relaxed  0448 Assisted CNA with bed and bath. Positioned client well to L side as he is struggling to breathe when on back to a degree. 0549  Resting soundly with unlabored respiraitons  0639  Moaning softly as I enter room and sounded like saying Ow intermittently. Scheduled lorazepam and Dilaudid as per MARs.   Continuing to monitor  0647  Resting quietly now with unlabored respirations  0700  Report to oncoming shift

## 2023-03-15 NOTE — PROGRESS NOTES
0700  report received. 0730  Pt lying in bed, unresponsive. No facial grimacing or moaning. Lungs are clear but diminished. Pt is on RA. No cough noted.  + bowel sounds. Last reported BM is unknown. Grant id draining straw colored urine. No edema noted. Skin is intact. Pt has an IV in her L hand and her R AC. SQ in her R UA and R thigh. Dressings are clear an intact. 6092  Dr clare ramirez. Pt resting comfortably. 0900  Family at the bedside  Rn updated them on pt's status. Pt resting. [address] at the bedside. Rn updated her on pt's status. Pt remains unresponsive. 1119  Pt medicated with scheduled meds. Pt  grimaced with movement. Family at the bedside. 1155  Pt yelling help, Help me. Pt medicated with Dilaudid and Lorazepam.  Fingers cyanotic. Skin warm to touch. 1230  Pt getting shaved. Pt stating help me, help me. 1246  Pt medicated with dilaudid and Lorazepam.  Cornelia Wilkins from [place] in to visit. 1300  Pt resting. 1340  Pt turned and repositioned. Pt shaved, Pt yelling out. Moaning with movement. Skin warm to touch. Temp 100.4 Pt medicated with Toradol, Dilaudid and Lorazepam.    1430 Pt cooler to touch. 1530   Pt medicated with scheduled meds. Meds increased by Dr Doretha Barrett due to amt of PRN used. Elvira 855 at the bedside stating pt continues to say, help me, help me. This RN advised her that his dilaudid  has been increased and pt only speaks out with stimulation. Dtr very appreciative of the info. Rn advised on the increased temp. Pt cool at this time. Dtr states her sister is coming into town and is an Rn. Timothy Zhang  She does not want any medical information given to her. 1715  Pt resting. No grimacing. No talking out. 18  Pt asking Dtr to help me, help me. Pt medicated with Dilaudid and Lorazepam    1810 Pt very relaxed. Respirations shallow, No grimacing. 1900  report given.         NAME OF PATIENT:  Leonard Rivera    LEVEL OF CARE:  GIP    REASON FOR GIP:   Pain, despite numerous changes in medications, Terminal agitation, despite changes to medications, Medication adjustment that must be monitored 24/7, and Stabilizing treatment that cannot take place at home    *PATIENT REMAINS ELIGIBLE FOR GIP LEVEL OF CARE AS EVIDENCED BY: (MUST BE ADDRESSED OF PATIENT GIP)  Pt is unable to tolerate PO intake. Pt is utilizing IV medications. Pt is very large and requires 3 assist to reposition. Pt needs frequent nursing assessments for comfort. REASON FOR RESPITE:  n/a    O2 SAFETY:  RA    FALL INTERVENTIONS PROVIDED:   Implemented/recommended resources for alarm system (personal alarm, bed alarm, call bell, etc.)     INTERDISPLINARY COMMUNICATION/COLLABORATION:  Physician, CASSANDRA, Leonard Espinal, and RN, LEONOR    NEW MEDICATION INITIATION DOCUMENTATION:  No new medications initiated. Reason medication is being initiated:  n/a    MD / Provider name consulted re: change in status / initiation of new medication:  n/a    New Symptom(s):  n/a    New Order(s):  n/a    Name of the person notified of the changes:  n/a    Name of person being taught:  n/a    Instructions given:  n/a    Side Effects taught:  n/a    Response to teaching:  n/a      COMFORTABLE DYING MEASURE:  Is Patient/family satisfied with symptom level?  yes    DISCHARGE PLAN:  Pt will remain at the [facility] for EOL. If he should stabilize, Sw and FAmily are looking at alternative living arrangements.

## 2023-03-15 NOTE — PROGRESS NOTES
Problem: Risk for Falls  Goal: Free of falls during inpatient stay  Description: Patient will be free of falls during inpatient stay. Outcome: Progressing Towards Goal     Problem: Pain  Goal: Assess satisfaction of level of comfort and symptom control  Outcome: Progressing Towards Goal  Goal: *Control of acute pain  Outcome: Progressing Towards Goal     Problem: Anxiety/Agitation  Goal: Verbalize or staff assess the ability to manage anxiety  Description: The patient/family/caregiver will verbalize and demonstrate ability to manage the patient's anxiety throughout hospice care. Outcome: Progressing Towards Goal     Problem: Infection - Risk of, Urinary Catheter-Associated Urinary Tract Infection  Goal: *Absence of infection signs and symptoms  Outcome: Progressing Towards Goal     Problem: End of Life Process  Goal: Demonstrate understanding of end of life processes  Description: Patient/caregiver will understand end of life processes.   Outcome: Progressing Towards Goal

## 2023-03-15 NOTE — PROGRESS NOTES
Problem: Pressure Injury - Risk of  Goal: *Prevention of pressure injury  Description: Document Reji Scale and appropriate interventions in the flowsheet. Outcome: Progressing Towards Goal  Note: Pressure Injury Interventions:  Sensory Interventions: Assess changes in LOC, Check visual cues for pain    Moisture Interventions: Absorbent underpads    Activity Interventions: Pressure redistribution bed/mattress(bed type)    Mobility Interventions: Float heels, HOB 30 degrees or less, Pressure redistribution bed/mattress (bed type)    Nutrition Interventions:  (unable to tolerate PO intake)    Friction and Shear Interventions: HOB 30 degrees or less, Lift sheet                Problem: Patient Education: Go to Patient Education Activity  Goal: Patient/Family Education  Outcome: Progressing Towards Goal     Problem: Risk for Falls  Goal: Free of falls during inpatient stay  Description: Patient will be free of falls during inpatient stay. Outcome: Progressing Towards Goal     Problem: Pain  Goal: Assess satisfaction of level of comfort and symptom control  Outcome: Progressing Towards Goal  Goal: *Control of acute pain  Outcome: Progressing Towards Goal     Problem: Anxiety/Agitation  Goal: Verbalize or staff assess the ability to manage anxiety  Description: The patient/family/caregiver will verbalize and demonstrate ability to manage the patient's anxiety throughout hospice care. Outcome: Progressing Towards Goal     Problem: Infection - Risk of, Urinary Catheter-Associated Urinary Tract Infection  Goal: *Absence of infection signs and symptoms  Outcome: Progressing Towards Goal     Problem: End of Life Process  Goal: Demonstrate understanding of end of life processes  Description: Patient/caregiver will understand end of life processes.   Outcome: Progressing Towards Goal

## 2023-03-15 NOTE — PROGRESS NOTES
[facility] Help to Those in Need  [phone]    Patient Name: Jojo Akbar  YOB: 1945    Date of Provider Hospice Visit: 03/15/23    Level of Care:   [x] General Inpatient (GIP)    [] Routine   [] Respite    Current Location of Care:  [] [facility] [] [facility] [] [address] [] [facility] [x] Hospice House [facility], patient referred from:  [] [facility] [] [facility] [] [address] [] [facility] [x] Home [] Other:       Principle Hospice Diagnosis: Metastatic renal cell cancer  Diagnoses RELATED to the terminal prognosis: Cancer associated pain, probable terminal delirium  Other Diagnoses:      Reji Vicente is a 70y.o. year old who was admitted to [facility]. Patient is a 74-year-old male being followed by [facility] secondary to metastatic renal cell cancer. Patient apparently had a very difficult week as I reviewed the chart sent by KIRSTEN HOGUE as well as an emergency room visit on 3/4/2023. Patient apparently is having significant increasing pain issues despite multiple medication adjustments. In reviewing the chart, patient had been on MS Contin 30 mg 3 times a day, fentanyl 75 mcg every 72 hours, along with morphine 5 mg every hour as needed for breakthrough pain. It appears on 3/10/2023, medications were adjusted to include fentanyl 100 mcg every 72 hours, methadone 10 mg 3 times a day and Dilaudid 2 mg every 2 hours as needed for breakthrough pain. Despite this adjustment, apparently patient remained in crisis with both pain and restlessness at home. Patient subsequently sent to the community hospice house for [facility] level care.   There is no family present but apparently daughter was caring for him and understands that he is transitioning towards end of life and wants to make sure he remains comfortable    The patient's principle diagnosis has resulted in pain, agitation  Refer to LCD     Functionally, the patient's Karnofsky and/or Palliative Performance Scale has declined over a period of weeks and is estimated at 10. The patient is dependent on the following ADLs:    Objective information that support this patients limited prognosis includes: See note above. The patient/family chose comfort measures with the support of Hospice. HOSPICE DIAGNOSES   Active Symptoms:  1. Cancer associated pain  2. Suspect terminal delirium/agitation  3. 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 Hospice care     PLAN   Patient will continue GIP LOC as needs frequent nursing assessments, IV medication management and symptoms not controlled on high dose oral meds in the home. Looks much more comfortable this AM. Daughter/PHYLICIA/sister/brother at bedside later today  Pain management-see note from yesterday on the calculations of the opioids given the amount required in the home. The scheduled dilaudid 8 mg IV every 4 and every 15 min prn has definitely helped. Much more comfortable this morning and did not require prn doses overnight. Terminal delirium/agitation-continue ativan 4 mg IV every 4 and every 15 min prn  Comfort meds for all other symptoms  Plan reviewed with bedside nursing team and we will review the plan with [facility] hospice nurse when they arrive  Talked with family at the bedside. Very appreciate of the [facility] care and reviewed meds, POC, prognosis. Provided reassurance to daughter about the transition to [facility] as she was feeling a little guilty she could not keep him at home.  and SW to support family needs  Disposition: Death at the hospice house  Hospice Plan of care was reviewed in detail and agree with current plan of care    Prognosis estimated based on 03/15/23 clinical assessment is:   [x] Hours to Days    [] Days to Weeks    [] Other:    Communicated plan of care with: Hospice Case Manager;  Hospice IDT; Care Team     GOALS OF CARE     Patient/Medical POA stated Goal of Care: Hospice care    [x] I have reviewed and/or updated ACP information in the Advance Care Planning Navigator. This information is available in the [address] link in the patient's chart header. Primary Decision [facility] Agent):   Primary Decision Maker: Vidya Haynes - Daughter - [phone]    Secondary Decision Maker: Kaylen Escobar (stepdtr) - Child - [phone]    Resuscitation Status: DNR  If DNR is there a Durable DNR on file? : [x] Yes [] No (If no, complete Durable DNR)    HISTORY     History obtained from: Chart, daughter, son Brit Spore: Generalized pain  The patient is:   [x] Verbal  [] Nonverbal  [] Unresponsive    HPI/SUBJECTIVE: Patient really cannot verbalize much but definitely significant moaning especially with movement    3/15-minimally responsive but much more comfortable.  No grimacing or moaning this morning       REVIEW OF SYSTEMS     The following systems were: [] reviewed  [x] unable to be reviewed    Positive ROS include:  Constitutional: fatigue, weakness, in pain, short of breath  Ears/nose/mouth/throat: increased airway secretions  Respiratory:shortness of breath, wheezing  Gastrointestinal:poor appetite, nausea, vomiting, abdominal pain, constipation, diarrhea  Musculoskeletal:pain, deformities, swelling legs  Neurologic:confusion, hallucinations, weakness  Psychiatric:anxiety, feeling depressed, poor sleep  Endocrine:     Adult Non-Verbal Pain Assessment Score: 2    Face  [] 0   No particular expression or smile  [x] 1   Occasional grimace, tearing, frowning, wrinkled forehead  [] 2   Frequent grimace, tearing, frowning, wrinkled forehead    Activity (movement)  [] 0   Lying quietly, normal position  [x] 1   Seeking attention through movement or slow, cautious movement  [] 2   Restless, excessive activity and/or withdrawal reflexes    Guarding  [x] 0   Lying quietly, no positioning of hands over areas of body  [] 1   Splinting areas of the body, tense  [] 2   Rigid, stiff    Physiology (vital signs)  [x] 0   Stable vital signs  [] 1   Change in any of the following: SBP > 20mm Hg; HR > 20/minute  [] 2   Change in any of the following: SBP > 30mm Hg; HR > 25/minute    Respiratory  [x] 0   Baseline RR/SpO2, compliant with ventilator  [] 1   RR > 10 above baseline, or 5% drop SpO2, mild asynchrony with ventilator  [] 2   RR > 20 above baseline, or 10% drop SpO2, asynchrony with ventilator     FUNCTIONAL ASSESSMENT     Palliative Performance Scale (PPS): 10       PSYCHOSOCIAL/SPIRITUAL ASSESSMENT     Active Problems:    * No active hospital problems. *    Past Medical History:   Diagnosis Date    Chronic kidney disease     Stage 2    Diabetes mellitus with stage 2 chronic kidney disease (HCC)     High cholesterol     Morbid obesity with BMI of 40.0-44.9, adult ([facility] [address]     MRSA infection     Abcess on abdomen    Sleep apnea with use of continuous positive airway pressure (CPAP)     Tinnitus aurium, bilateral       Past Surgical History:   Procedure Laterality Date    HX CERVICAL FUSION N/A     HX CHOLECYSTECTOMY      HX COLONOSCOPY N/A     HX CYST INCISION AND DRAINAGE N/A     Cyst on abdomen    HX LUMBAR FUSION N/A 2019    L 4-S1 Lami & L4-5 Fusion    HX TONSILLECTOMY N/A       Social History     Tobacco Use    Smoking status: Former     Packs/day: 1.50     Years: 20.00     Pack years: 30.00     Types: Cigarettes     Quit date:      Years since quittin.2    Smokeless tobacco: Never   Substance Use Topics    Alcohol use:  Yes     Alcohol/week: 5.0 standard drinks     Types: 1 Glasses of wine, 2 Cans of beer, 2 Shots of liquor per week     Family History   Problem Relation Age of Onset    Pneumonia Mother     Heart Attack Father 45    Heart Surgery Sister 76        CABG    No Known Problems Sister       No Known Allergies   Current Facility-Administered Medications   Medication Dose Route Frequency    bisacodyL (DULCOLAX) suppository 10 mg  10 mg Rectal DAILY PRN    acetaminophen (TYLENOL) suppository 650 mg 650 mg Rectal Q4H PRN    ketorolac (TORADOL) injection 30 mg  30 mg IntraMUSCular Q6H PRN    glycopyrrolate (ROBINUL) injection 0.2 mg  0.2 mg IntraVENous Q4H PRN    LORazepam (ATIVAN) injection 4 mg  4 mg SubCUTAneous Q4H    LORazepam (ATIVAN) injection 4 mg  4 mg SubCUTAneous Q15MIN PRN    HYDROmorphone (DILAUDID) injection 8 mg  8 mg SubCUTAneous Q4H    HYDROmorphone (DILAUDID) injection 8 mg  8 mg SubCUTAneous Q15MIN PRN        PHYSICAL EXAM     Wt Readings from Last 3 Encounters:   03/04/23 99.8 kg (220 lb)   10/21/22 99.8 kg (220 lb)   01/07/19 120.9 kg (266 lb 8.6 oz)       Visit Vitals  /60   Pulse 70   Temp 97.6 °F (36.4 °C)   Resp 14   SpO2 (!) 61%       Supplemental O2  [] Yes  [x] NO  Last bowel movement:     Currently this patient has:  [] Peripheral IV [] PICC  [] PORT [] ICD    [x] Grant Catheter [] NG Tube   [] PEG Tube    [] Rectal Tube [] Drain  [x] Other: Subcutaneous    Constitutional: much more comfortable, minimally responsive  Eyes: Closed  ENMT: Dry  Cardiovascular: rrr  Respiratory: no wob or accessory muscle use  Gastrointestinal: Soft  Musculoskeletal: Unremarkable  Skin: Slightly cool extremities  Neurologic: minimally responsive  Psychiatric: calm  Other:       Pertinent Lab and or Imaging Tests:  Lab Results   Component Value Date/Time    Sodium 140 10/25/2022 04:14 AM    Potassium 3.7 10/25/2022 04:14 AM    Chloride 108 10/25/2022 04:14 AM    CO2 27 10/25/2022 04:14 AM    Anion gap 5 10/25/2022 04:14 AM    Glucose 153 (H) 10/25/2022 04:14 AM    BUN 27 (H) 10/25/2022 04:14 AM    Creatinine 1.25 10/25/2022 04:14 AM    BUN/Creatinine ratio 22 (H) 10/25/2022 04:14 AM    GFR est AA >60 01/11/2019 06:07 AM    GFR est non-AA >60 01/11/2019 06:07 AM    Calcium 8.6 10/25/2022 04:14 AM     Lab Results   Component Value Date/Time    Protein, total 7.2 10/21/2022 11:17 AM    Albumin 3.4 (L) 10/21/2022 11:17 AM           Total time:   Counseling / coordination time:   > 50% counseling / coordination?:

## 2023-03-16 NOTE — PROGRESS NOTES
..  0700:Report received from [address] [place] [address] using SBAR I/O and [facility] Pt is minimally responsive to verbal and tactile stimuli, cough noted HOB elevated. 08:29:Adm schedule comfort meds with PRN SONDRA. Pt turned and repositioned for comfort. 04:46:LEONOR Leung rounds new orders to increase. 10:24:Adm schedule comfort meds, daughters at bedside updated on care. 11:15:Pt  resting more comfortable family at bedside, emotional support given. 12:30:Pt less restless no movents noted call out occasionally no grimace or labored breathing noted. 13:32:Pt resting comfortable family at bedside  14:49:Adm schedule comfort meds. 1600:Pt resting more comfortable, audible congestion noted HOB elevated suctioning setup at bedside. 17:10:Pt resp even and non labored, resting comfortable daughter at bedside. 18:36:Adm schedule comfort meds, dtr at bedside, audible congestion noted HOB elevated. 1900:Report given to [address] [place] [address]. NAME OF PATIENT:      LEVEL OF CARE:[facility]  REASON FOR GIP:Pain and Restlessness     *PATIENT REMAINS ELIGIBLE FOR GIP LEVEL OF CARE AS EVIDENCED BY:Pain and restlessness despite med changes.       REASON FOR RESPITE:n/a       O2 SAFETY:  Concentrator positioning (6\" from furniture/drapes), Tanks stored in ledesma , No petroleum based products on face while oxygen in use and Oxygen sign on the door     FALL INTERVENTIONS PROVIDED:   Implemented/recommended use of fall risk identification flag to all team members, Implemented/recommended assistive devices and encouraged their use, Implemented/recommended resources for alarm system (personal alarm, bed alarm, call bell, etc.)  and Implemented/recommended environmental changes (remove hazards, lower bed, improve lighting, etc.)     INTERDISPLINARY COMMUNICATION/COLLABORATION:  Physician, MSW, Aaron and RN, CNA     [address] DOCUMENTATION:N/A      Reason medication is being initiated:  N/A     MD / Provider name consulted re: change in status / initiation of new medication:  N/A     New Symptom(s):  N/A     New Order(s):  N/A     Name of the person notified of the changes:  N/A     Name of person being taught:  N/A     Instructions given:  N/A     Side Effects taught:  N/A     Response to teaching:  N/A        COMFORTABLE DYING MEASURE:  Is Patient/family satisfied with symptom level?  yes     DISCHARGE PLAN:Pt will remain at [facility] until he passes.

## 2023-03-16 NOTE — PROGRESS NOTES
Problem: Pressure Injury - Risk of  Goal: *Prevention of pressure injury  Description: Document Reji Scale and appropriate interventions in the flowsheet. Outcome: Not Progressing Towards Goal  Note: Pressure Injury Interventions:  Sensory Interventions: Assess changes in LOC, Avoid rigorous massage over bony prominences, Chair cushion, Check visual cues for pain    Moisture Interventions: Absorbent underpads, Apply protective barrier, creams and emollients    Activity Interventions: Pressure redistribution bed/mattress(bed type)    Mobility Interventions: Float heels, HOB 30 degrees or less    Nutrition Interventions:  (NPO)    Friction and Shear Interventions: HOB 30 degrees or less, Lift sheet, Minimize layers                Problem: Risk for Falls  Goal: Free of falls during inpatient stay  Description: Patient will be free of falls during inpatient stay.   Outcome: Not Progressing Towards Goal     Problem: Pain  Goal: Assess satisfaction of level of comfort and symptom control  Outcome: Not Progressing Towards Goal

## 2023-03-16 NOTE — PROGRESS NOTES
Problem: Pressure Injury - Risk of  Goal: *Prevention of pressure injury  Description: Document Reji Scale and appropriate interventions in the flowsheet. Outcome: Progressing Towards Goal  Note: Pressure Injury Interventions:  Sensory Interventions: Assess changes in LOC, Check visual cues for pain, Float heels, Keep linens dry and wrinkle-free, Minimize linen layers, Pressure redistribution bed/mattress (bed type)    Moisture Interventions: Absorbent underpads, Internal/External urinary devices, Maintain skin hydration (lotion/cream), Minimize layers, Moisture barrier    Activity Interventions: Pressure redistribution bed/mattress(bed type)    Mobility Interventions: HOB 30 degrees or less, Float heels, Pressure redistribution bed/mattress (bed type)    Nutrition Interventions:  (NPO)    Friction and Shear Interventions: HOB 30 degrees or less, Lift sheet, Minimize layers                Problem: Pain  Goal: Assess satisfaction of level of comfort and symptom control  Outcome: Progressing Towards Goal  Goal: *Control of acute pain  Outcome: Progressing Towards Goal     Problem: Anxiety/Agitation  Goal: Verbalize or staff assess the ability to manage anxiety  Description: The patient/family/caregiver will verbalize and demonstrate ability to manage the patient's anxiety throughout hospice care.   Outcome: Progressing Towards Goal     Problem: Infection - Risk of, Urinary Catheter-Associated Urinary Tract Infection  Goal: *Absence of infection signs and symptoms  Outcome: Progressing Towards Goal

## 2023-03-16 NOTE — PROGRESS NOTES
1900: Report received from [place], [facility]: Patient in bed on left side, does not open eyes to voice. CNA enters, vitals taken, documented. White board updated. 1945: Scheduled dilaudid and ativan given. Assessment completed, documented. All extremities warm to touch, capillary refill less than 3 seconds. 2045: Patient resting in bed, eyes closed, neutral facial expression, body relaxed. 2145: Spoke to patient's daughter Carmen Vela who is calling for status update and to inform that step-sister Fabricio Wren is on the way to [facility] from [place] to spend the night with patient. States Fabricio Wren should be arriving in approximately 1 hour. 2200: Patient in bed, eyes closed but appears restless, attempting to move around in bed, repeating \"help me, help me\". When patient's hand is touched, patient states \"hurt, hurt, help me, help me\". PRN dilaudid and ativan given. 2245: Patient's daughter Fabricio Wren, from [place], arrives. Yaquelin oriented to room, shown family room, extra blankets given. 2312: Patient resting in bed on left side, unlabored respirations. Scheduled dilaudid and ativan given. Grant emptied, 250 ml melonie colored urine out. 0000: Patient resting quietly in bed, neutral facial expression, body relaxed. 6018: Patient in bed, eyes closed, unlabored respirations. Daughter Fabricio Wren sleeping on couch in room. 0155: Patient resting quietly in bed, neutral facial expression, body relaxed. 0255: Patient sleeping, shallow respirations. 0309: Scheduled dilaudid and ativan given, daughter asleep on couch in room. 0400: Patient resting quietly, neutral facial expression, body relaxed. 7334: Attempted to give patient bed bath. When body is touched, arms or legs, patient repeats \"hurt, hurt, hurt, help me, help me\". PRN dilaudid and ativan given, bed bath completed, lotion applied, patient turned to right side. Tolerates poorly. Non-productive coughing noted.  Additional pillow added behind patient, turned more to right side, coughing subsides. 56: Daughter Nhi Arnold uses call bell, says patient states he needs to have a bowel movement. Incontinence check, patient has had bowel movement. Incontinence care complete, pad replaced. Patient tolerates fair with some moaning. 2057: Daughter calls again to state patient needs to have another bowel movement. Upon arrival in room, patient has already had second bowel movement. Incontinence care complete, pad and gown changed. Patient only repeats one to two word phrases when staff present and does not respond to questions. 0700: Report given to oncoming RN. NAME OF PATIENT:  Leonard Rivera    LEVEL OF CARE:  GIP    REASON FOR GIP:   Pain, despite numerous changes in medications, Terminal agitation, despite changes to medications, Medication adjustment that must be monitored 24/7, and Stabilizing treatment that cannot take place at home    *PATIENT REMAINS ELIGIBLE FOR GIP LEVEL OF CARE AS EVIDENCED BY: Patient with uncontrolled pain and restlessness, receiving IV medications as cannot tolerate oral medications. Medications needing adjustments and patient needed frequent nursing assessments that cannot take place in the home setting.        REASON FOR RESPITE:  N/A    O2 SAFETY:  N/A    FALL INTERVENTIONS PROVIDED:   Implemented/recommended use of non-skid footwear, Implemented/recommended use of fall risk identification flag to all team members, Implemented/recommended assistive devices and encouraged their use, Implemented/recommended resources for alarm system (personal alarm, bed alarm, call bell, etc.) , Implemented/recommended environmental changes (remove hazards, lower bed, improve lighting, etc.), and Implemented/recommended increased supervision/assistance    INTERDISPLINARY COMMUNICATION/COLLABORATION:  Physician, CASSANDRA, Katt Willoughby, and RN, LEONOR    NEW MEDICATION INITIATION DOCUMENTATION:  N/A    Reason medication is being initiated: N/A    MD / Provider name consulted re: change in status / initiation of new medication:  N/A    New Symptom(s):  N/A    New Order(s):  N/A    Name of the person notified of the changes:  N/A    Name of person being taught:  N/A    Instructions given:  N/A    Side Effects taught:  N/A    Response to teaching:  N/A      COMFORTABLE DYING MEASURE:  Is Patient/family satisfied with symptom level?  yes    DISCHARGE PLAN:  Patient will likely  at [facility]. Should symptoms stabilize, patient will discharge home with home hospice services.

## 2023-03-16 NOTE — PROGRESS NOTES
[facility] Help to Those in Need  [phone]    Patient Name: Cristino Mojica  YOB: 1945    Date of Provider Hospice Visit: 03/16/23    Level of Care:   [x] General Inpatient (GIP)    [] Routine   [] Respite    Current Location of Care:  [] [facility] [] [facility] [] [facility] [] [facility] [x] Hospice House [facility], patient referred from:  [] [facility] [] [facility] [] [facility] [] [facility] [x] Home [] Other:       Principle Hospice Diagnosis: Metastatic renal cell cancer  Diagnoses RELATED to the terminal prognosis: Cancer associated pain, probable terminal delirium  Other Diagnoses:      Ирина Burgos is a 70y.o. year old who was admitted to [facility]. Patient is a 74-year-old male being followed by [facility] secondary to metastatic renal cell cancer. Patient apparently had a very difficult week as I reviewed the chart sent by KIRSTEN HOGUE as well as an emergency room visit on 3/4/2023. Patient apparently is having significant increasing pain issues despite multiple medication adjustments. In reviewing the chart, patient had been on MS Contin 30 mg 3 times a day, fentanyl 75 mcg every 72 hours, along with morphine 5 mg every hour as needed for breakthrough pain. It appears on 3/10/2023, medications were adjusted to include fentanyl 100 mcg every 72 hours, methadone 10 mg 3 times a day and Dilaudid 2 mg every 2 hours as needed for breakthrough pain. Despite this adjustment, apparently patient remained in crisis with both pain and restlessness at home. Patient subsequently sent to the [facility] hosp[facility] house for [facility] level care.   There is no family present but apparently daughter was caring for him and understands that he is transitioning towards end of life and wants to make sure he remains comfortable    The patient's principle diagnosis has resulted in pain, agitation  Refer to LCD     Functionally, the patient's Karnofsky and/or Palliative Performance Scale has declined over a period of weeks and is estimated at 10. The patient is dependent on the following ADLs:    Objective information that support this patients limited prognosis includes: See note above. The patient/family chose comfort measures with the support of Hospice. HOSPICE DIAGNOSES   Active Symptoms:  1. Cancer associated pain  2. Suspect terminal delirium/agitation  3. 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 Hospice care     PLAN   Patient will continue GIP LOC as needs frequent nursing assessments, IV medication management and symptoms not controlled on high dose oral meds in the home. As anticipated, patient requiring more medication adjustments yesterday and tonight as the oral medication he was receiving out of his system. Had 6 prn doses(8 mg x3 and 10 mg x3) and 6 prn doses of ativan so needs further adjustments in meds  Pain management-given the as needed dosing, we will increase his Dilaudid to 12 mg every 4 hours and every 15 minutes as needed for pain and or shortness of breath. Once again, I suspect the oral medication has now cleared his system and now we are having to counteract with increasing IV regiment. I do think there is a component of restlessness and agitation and so I did like to adjust other medications. Terminal delirium/agitation-continue ativan 4 mg IV every 4 and every 15 min prn. I am not sure increasing Ativan will have as much impact so we will add phenobarbital 65 mg subcu every 8 hours to see if this helps calm him. Comfort meds for all other symptoms  Plan reviewed with bedside nursing team and we will review the plan with [facility] hospice nurse when they arrive  Reviewed plan with patient's daughter and stepdaughter along with son-in-law at the bedside. They remain very appreciative of the hospice house and reassured and that we will continue to make adjustments throughout the day. Margy Jones and DELMA to support family needs  Disposition: Death at the hospice house  Hospice Plan of care was reviewed in detail and agree with current plan of care    Prognosis estimated based on 03/16/23 clinical assessment is:   [x] Hours to Days    [] Days to Weeks    [] Other:    Communicated plan of care with: Hospice Case Manager; Hospice IDT; Care Team     GOALS OF CARE     Patient/Medical POA stated Goal of Care: Hospice care    [x] I have reviewed and/or updated ACP information in the Advance Care Planning Navigator. This information is available in the [address] link in the patient's chart header. Primary Decision [facility] Agent):   Primary Decision Maker: Chris Mckeon - Daughter - [phone]    Secondary Decision Maker: Kyaw Walters (stepdtr) - Child - [phone]    Resuscitation Status: DNR  If DNR is there a Durable DNR on file? : [x] Yes [] No (If no, complete Durable DNR)    HISTORY     History obtained from: Chart, daughter, son Zev Later: Generalized pain  The patient is:   [x] Verbal  [] Nonverbal  [] Unresponsive    HPI/SUBJECTIVE: Patient really cannot verbalize much but definitely significant moaning especially with movement    3/15-minimally responsive but much more comfortable. No grimacing or moaning this morning    3/16-patient much more restless today. He is having evidence of moaning/grimacing even yelling out help at times.        REVIEW OF SYSTEMS     The following systems were: [] reviewed  [x] unable to be reviewed    Positive ROS include:  Constitutional: fatigue, weakness, in pain, short of breath  Ears/nose/mouth/throat: increased airway secretions  Respiratory:shortness of breath, wheezing  Gastrointestinal:poor appetite, nausea, vomiting, abdominal pain, constipation, diarrhea  Musculoskeletal:pain, deformities, swelling legs  Neurologic:confusion, hallucinations, weakness  Psychiatric:anxiety, feeling depressed, poor sleep  Endocrine:     Adult Non-Verbal Pain Assessment Score: 5    Face  [] 0   No particular expression or smile  [] 1   Occasional grimace, tearing, frowning, wrinkled forehead  [x] 2   Frequent grimace, tearing, frowning, wrinkled forehead    Activity (movement)  [] 0   Lying quietly, normal position  [] 1   Seeking attention through movement or slow, cautious movement  [x] 2   Restless, excessive activity and/or withdrawal reflexes    Guarding  [x] 0   Lying quietly, no positioning of hands over areas of body  [] 1   Splinting areas of the body, tense  [] 2   Rigid, stiff    Physiology (vital signs)  [x] 0   Stable vital signs  [] 1   Change in any of the following: SBP > 20mm Hg; HR > 20/minute  [] 2   Change in any of the following: SBP > 30mm Hg; HR > 25/minute    Respiratory  [] 0   Baseline RR/SpO2, compliant with ventilator  [x] 1   RR > 10 above baseline, or 5% drop SpO2, mild asynchrony with ventilator  [] 2   RR > 20 above baseline, or 10% drop SpO2, asynchrony with ventilator     FUNCTIONAL ASSESSMENT     Palliative Performance Scale (PPS): 10       PSYCHOSOCIAL/SPIRITUAL ASSESSMENT     Active Problems:    * No active hospital problems.  *    Past Medical History:   Diagnosis Date    Chronic kidney disease     Stage 2    Diabetes mellitus with stage 2 chronic kidney disease (HCC)     High cholesterol     Morbid obesity with BMI of 40.0-44.9, adult ([facility] [address]     MRSA infection     Abcess on abdomen    Sleep apnea with use of continuous positive airway pressure (CPAP)     Tinnitus aurium, bilateral       Past Surgical History:   Procedure Laterality Date    HX CERVICAL FUSION N/A     HX CHOLECYSTECTOMY      HX COLONOSCOPY N/A     HX CYST INCISION AND DRAINAGE N/A     Cyst on abdomen    HX LUMBAR FUSION N/A 2019    L 4-S1 Lami & L4-5 Fusion    HX TONSILLECTOMY N/A       Social History     Tobacco Use    Smoking status: Former     Packs/day: 1.50     Years: 20.00     Pack years: 30.00     Types: Cigarettes     Quit date:      Years since quittin.2    Smokeless tobacco: Never   Substance Use Topics    Alcohol use: Yes     Alcohol/week: 5.0 standard drinks     Types: 1 Glasses of wine, 2 Cans of beer, 2 Shots of liquor per week     Family History   Problem Relation Age of Onset    Pneumonia Mother     Heart Attack Father 45    Heart Surgery Sister 76        CABG    No Known Problems Sister       No Known Allergies   Current Facility-Administered Medications   Medication Dose Route Frequency    HYDROmorphone (DILAUDID) injection 12 mg  12 mg SubCUTAneous Q4H    PHENobarbital (LUMINAL) injection 65 mg  65 mg SubCUTAneous Q8H    HYDROmorphone (DILAUDID) injection 10 mg  10 mg SubCUTAneous Q15MIN PRN    bisacodyL (DULCOLAX) suppository 10 mg  10 mg Rectal DAILY PRN    acetaminophen (TYLENOL) suppository 650 mg  650 mg Rectal Q4H PRN    ketorolac (TORADOL) injection 30 mg  30 mg IntraMUSCular Q6H PRN    glycopyrrolate (ROBINUL) injection 0.2 mg  0.2 mg IntraVENous Q4H PRN    LORazepam (ATIVAN) injection 4 mg  4 mg SubCUTAneous Q4H    LORazepam (ATIVAN) injection 4 mg  4 mg SubCUTAneous Q15MIN PRN        PHYSICAL EXAM     Wt Readings from Last 3 Encounters:   03/04/23 99.8 kg (220 lb)   10/21/22 99.8 kg (220 lb)   01/07/19 120.9 kg (266 lb 8.6 oz)       Visit Vitals  /68   Pulse 72   Temp 99.8 °F (37.7 °C)   Resp 19   SpO2 (!) 81%       Supplemental O2  [] Yes  [x] NO  Last bowel movement:     Currently this patient has:  [x] Peripheral IV [] PICC  [] PORT [] ICD    [x] Grant Catheter [] NG Tube   [] PEG Tube    [] Rectal Tube [] Drain  [x] Other: Subcutaneous    Constitutional: Much more restless, evidence of moaning, grimacing, at times yelling out for help.   Remains nonverbal  Eyes: Closed  ENMT: Dry  Cardiovascular: rrr  Respiratory: Increased work of breathing, mild accessory muscle use, mild increased work of breathing  Gastrointestinal: Soft  Musculoskeletal: Unremarkable  Skin: Slightly cool extremities  Neurologic: minimally responsive  Psychiatric: Restless  Other:       Pertinent Lab and or Imaging Tests:  Lab Results   Component Value Date/Time    Sodium 140 10/25/2022 04:14 AM    Potassium 3.7 10/25/2022 04:14 AM    Chloride 108 10/25/2022 04:14 AM    CO2 27 10/25/2022 04:14 AM    Anion gap 5 10/25/2022 04:14 AM    Glucose 153 (H) 10/25/2022 04:14 AM    BUN 27 (H) 10/25/2022 04:14 AM    Creatinine 1.25 10/25/2022 04:14 AM    BUN/Creatinine ratio 22 (H) 10/25/2022 04:14 AM    GFR est AA >60 01/11/2019 06:07 AM    GFR est non-AA >60 01/11/2019 06:07 AM    Calcium 8.6 10/25/2022 04:14 AM     Lab Results   Component Value Date/Time    Protein, total 7.2 10/21/2022 11:17 AM    Albumin 3.4 (L) 10/21/2022 11:17 AM           Total time:   Counseling / coordination time:   > 50% counseling / coordination?:

## 2023-03-17 NOTE — PROGRESS NOTES
Problem: Pressure Injury - Risk of  Goal: *Prevention of pressure injury  Description: Document Reji Scale and appropriate interventions in the flowsheet. Outcome: Not Progressing Towards Goal  Note: Pressure Injury Interventions:  Sensory Interventions: Assess changes in LOC, Avoid rigorous massage over bony prominences, Check visual cues for pain, Keep linens dry and wrinkle-free    Moisture Interventions: Absorbent underpads, Apply protective barrier, creams and emollients    Activity Interventions: Pressure redistribution bed/mattress(bed type)    Mobility Interventions: Float heels, HOB 30 degrees or less    Nutrition Interventions:  (NPO)    Friction and Shear Interventions: HOB 30 degrees or less, Lift sheet                Problem: Pain  Goal: Assess satisfaction of level of comfort and symptom control  Outcome: Not Progressing Towards Goal     Problem: Pain  Goal: Assess satisfaction of level of comfort and symptom control  Outcome: Not Progressing Towards Goal     Problem: Anxiety/Agitation  Goal: Verbalize or staff assess the ability to manage anxiety  Description: The patient/family/caregiver will verbalize and demonstrate ability to manage the patient's anxiety throughout hospice care.   Outcome: Not Progressing Towards Goal

## 2023-03-17 NOTE — PROGRESS NOTES
[facility] to Those in Need  [phone]    Patient Name: Maurice Narayanan  YOB: 1945    Date of Provider Hospice Visit: 03/17/23    Level of Care:   [x] General Inpatient (GIP)    [] Routine   [] Respite    Current Location of Care:  [] [facility] [] [facility] [] [facility] [] [facility] [x] Hospice House [facility], patient referred from:  [] [facility] [] [facility] [] [facility] [] [facility] [x] Home [] Other:       Principle Hospice Diagnosis: Metastatic renal cell cancer  Diagnoses RELATED to the terminal prognosis: Cancer associated pain, probable terminal delirium  Other Diagnoses:      Pasha Roldan is a 70y.o. year old who was admitted to [facility]. Patient is a 45-year-old male being followed by Irwin Lyon secondary to metastatic renal cell cancer. Patient apparently had a very difficult week as I reviewed the chart sent by KIRSTEN HOGUE as well as an emergency room visit on 3/4/2023. Patient apparently is having significant increasing pain issues despite multiple medication adjustments. In reviewing the chart, patient had been on MS Contin 30 mg 3 times a day, fentanyl 75 mcg every 72 hours, along with morphine 5 mg every hour as needed for breakthrough pain. It appears on 3/10/2023, medications were adjusted to include fentanyl 100 mcg every 72 hours, methadone 10 mg 3 times a day and Dilaudid 2 mg every 2 hours as needed for breakthrough pain. Despite this adjustment, apparently patient remained in crisis with both pain and restlessness at home. Patient subsequently sent to the community hospice house for [facility] level care.   There is no family present but apparently daughter was caring for him and understands that he is transitioning towards end of life and wants to make sure he remains comfortable    The patient's principle diagnosis has resulted in pain, agitation  Refer to LCD     Functionally, the patient's Karnofsky and/or Palliative Performance Scale has declined over a period of weeks and is estimated at 10. The patient is dependent on the following ADLs:    Objective information that support this patients limited prognosis includes: See note above. The patient/family chose comfort measures with the support of Hospice. HOSPICE DIAGNOSES   Active Symptoms:  1. Cancer associated pain  2. Suspect terminal delirium/agitation  3. 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 Hospice care     PLAN   Patient will continue GIP LOC as needs frequent nursing assessments, IV medication management and symptoms not controlled on high dose oral meds in the home. With the adjustment of his Dilaudid dose yesterday as well as the addition of phenobarbital, patient appears much more comfortable overnight and this morning. Does not appear as restless or agitated. Still occasionally was asking for \"help \"last night but certainly much less than the night before. Pain management-continue Dilaudid 12 mg IV every 4 hours and every 15 minutes as needed for pain and or shortness of breath. After the adjustments yesterday, patient only needed 1 as needed dose. Terminal delirium/agitation-continue the combination of Ativan 4 mg IV every 4 hours and phenobarbital 65 mg subcutaneous every 8 hours. We will continue to monitor and can adjust dosing if needed. Comfort meds for all other symptoms  Plan reviewed with bedside nursing team and we will review the plan with [facility] hospice nurse when they arrive  Reviewed plan with patient's stepdaughter at the bedside who stayed the night. I am sure patient's daughter will be here later today and we will meet with her as well. Margy Jones and DELMA to support family needs  Disposition: Death at the hospice house  Hospice Plan of care was reviewed in detail and agree with current plan of care    Prognosis estimated based on 03/17/23 clinical assessment is:   [x] Hours to Days    [] Days to Weeks    [] Other:    Communicated plan of care with: Hospice Case Manager; Hospice IDT; Care Team     GOALS OF CARE     Patient/Medical POA stated Goal of Care: Hospice care    [x] I have reviewed and/or updated ACP information in the Advance Care Planning Navigator. This information is available in the [address] link in the patient's chart header. Primary Decision [facility] Agent):   Primary Decision Maker: Chloé Zacarias - Daughter - [phone]    Secondary Decision Maker: Darius Neely (stepdtr) - Child - [phone]    Resuscitation Status: DNR  If DNR is there a Durable DNR on file? : [x] Yes [] No (If no, complete Durable DNR)    HISTORY     History obtained from: Chart, daughter, son Lucero Betancourt: Generalized pain  The patient is:   [x] Verbal  [] Nonverbal  [] Unresponsive    HPI/SUBJECTIVE: Patient really cannot verbalize much but definitely significant moaning especially with movement    3/15-minimally responsive but much more comfortable. No grimacing or moaning this morning    3/16-patient much more restless today. He is having evidence of moaning/grimacing even yelling out help at times. 3/17-patient appears much calmer this morning. No grimacing or moaning. No yelling out this morning. Still occasional secretions to the point where he needs some mild suctioning last night. He had 5 as needed doses of Robinul in the last 24 hours.   Patient also with low-grade fever and required 2 doses of Toradol       REVIEW OF SYSTEMS     The following systems were: [] reviewed  [x] unable to be reviewed    Positive ROS include:  Constitutional: fatigue, weakness, in pain, short of breath  Ears/nose/mouth/throat: increased airway secretions  Respiratory:shortness of breath, wheezing  Gastrointestinal:poor appetite, nausea, vomiting, abdominal pain, constipation, diarrhea  Musculoskeletal:pain, deformities, swelling legs  Neurologic:confusion, hallucinations, weakness  Psychiatric:anxiety, feeling depressed, poor sleep  Endocrine:     Adult Non-Verbal Pain Assessment Score: 3    Face  [] 0   No particular expression or smile  [x] 1   Occasional grimace, tearing, frowning, wrinkled forehead  [] 2   Frequent grimace, tearing, frowning, wrinkled forehead    Activity (movement)  [] 0   Lying quietly, normal position  [x] 1   Seeking attention through movement or slow, cautious movement  [] 2   Restless, excessive activity and/or withdrawal reflexes    Guarding  [x] 0   Lying quietly, no positioning of hands over areas of body  [] 1   Splinting areas of the body, tense  [] 2   Rigid, stiff    Physiology (vital signs)  [x] 0   Stable vital signs  [] 1   Change in any of the following: SBP > 20mm Hg; HR > 20/minute  [] 2   Change in any of the following: SBP > 30mm Hg; HR > 25/minute    Respiratory  [] 0   Baseline RR/SpO2, compliant with ventilator  [x] 1   RR > 10 above baseline, or 5% drop SpO2, mild asynchrony with ventilator  [] 2   RR > 20 above baseline, or 10% drop SpO2, asynchrony with ventilator     FUNCTIONAL ASSESSMENT     Palliative Performance Scale (PPS): 10       PSYCHOSOCIAL/SPIRITUAL ASSESSMENT     Active Problems:    * No active hospital problems.  *    Past Medical History:   Diagnosis Date    Chronic kidney disease     Stage 2    Diabetes mellitus with stage 2 chronic kidney disease (HCC)     High cholesterol     Morbid obesity with BMI of 40.0-44.9, adult ([facility] [address]     MRSA infection 2016    Abcess on abdomen    Sleep apnea with use of continuous positive airway pressure (CPAP)     Tinnitus aurium, bilateral 2018      Past Surgical History:   Procedure Laterality Date    HX CERVICAL FUSION N/A 2017    HX CHOLECYSTECTOMY      HX COLONOSCOPY N/A     HX CYST INCISION AND DRAINAGE N/A 2015    Cyst on abdomen    HX LUMBAR FUSION N/A 01/07/2019    L 4-S1 Lami & L4-5 Fusion    HX TONSILLECTOMY N/A       Social History     Tobacco Use    Smoking status: Former     Packs/day: 1.50     Years: 20.00     Pack years: 30.00 Types: Cigarettes     Quit date: 0     Years since quittin.2    Smokeless tobacco: Never   Substance Use Topics    Alcohol use:  Yes     Alcohol/week: 5.0 standard drinks     Types: 1 Glasses of wine, 2 Cans of beer, 2 Shots of liquor per week     Family History   Problem Relation Age of Onset    Pneumonia Mother     Heart Attack Father 45    Heart Surgery Sister 76        CABG    No Known Problems Sister       No Known Allergies   Current Facility-Administered Medications   Medication Dose Route Frequency    HYDROmorphone (DILAUDID) injection 12 mg  12 mg SubCUTAneous Q4H    PHENobarbital (LUMINAL) injection 65 mg  65 mg SubCUTAneous Q8H    ketorolac (TORADOL) injection 30 mg  30 mg IntraVENous Q6H PRN    HYDROmorphone (DILAUDID) injection 10 mg  10 mg SubCUTAneous Q15MIN PRN    bisacodyL (DULCOLAX) suppository 10 mg  10 mg Rectal DAILY PRN    acetaminophen (TYLENOL) suppository 650 mg  650 mg Rectal Q4H PRN    glycopyrrolate (ROBINUL) injection 0.2 mg  0.2 mg IntraVENous Q4H PRN    LORazepam (ATIVAN) injection 4 mg  4 mg SubCUTAneous Q4H    LORazepam (ATIVAN) injection 4 mg  4 mg SubCUTAneous Q15MIN PRN        PHYSICAL EXAM     Wt Readings from Last 3 Encounters:   23 99.8 kg (220 lb)   10/21/22 99.8 kg (220 lb)   19 120.9 kg (266 lb 8.6 oz)       Visit Vitals  BP (!) 88/47   Pulse 85   Temp 99.8 °F (37.7 °C)   Resp 18   SpO2 (!) 85%       Supplemental O2  [] Yes  [x] NO  Last bowel movement:     Currently this patient has:  [x] Peripheral IV [] PICC  [] PORT [] ICD    [x] Grant Catheter [] NG Tube   [] PEG Tube    [] Rectal Tube [] Drain  [x] Other: Subcutaneous    Constitutional: Appears much more comfortable, no grimacing, no furrowing of the brow, calm  Eyes: Closed  ENMT: Dry  Cardiovascular: rrr  Respiratory: No significant work of breathing, no accessory muscle use, secretions are diminished this morning  Gastrointestinal: Soft  Musculoskeletal: Unremarkable  Skin: Slightly cool extremities  Neurologic: minimally responsive  Psychiatric: calm  Other:       Pertinent Lab and or Imaging Tests:  Lab Results   Component Value Date/Time    Sodium 140 10/25/2022 04:14 AM    Potassium 3.7 10/25/2022 04:14 AM    Chloride 108 10/25/2022 04:14 AM    CO2 27 10/25/2022 04:14 AM    Anion gap 5 10/25/2022 04:14 AM    Glucose 153 (H) 10/25/2022 04:14 AM    BUN 27 (H) 10/25/2022 04:14 AM    Creatinine 1.25 10/25/2022 04:14 AM    BUN/Creatinine ratio 22 (H) 10/25/2022 04:14 AM    GFR est AA >60 01/11/2019 06:07 AM    GFR est non-AA >60 01/11/2019 06:07 AM    Calcium 8.6 10/25/2022 04:14 AM     Lab Results   Component Value Date/Time    Protein, total 7.2 10/21/2022 11:17 AM    Albumin 3.4 (L) 10/21/2022 11:17 AM           Total time:   Counseling / coordination time:   > 50% counseling / coordination?:

## 2023-03-17 NOTE — PROGRESS NOTES
200- Report received from [address] [place] [address]- Patient lying in bed with eyes closed. Patient responsive to pain and movement. Course crackles noted throughout all fields to ausculation. Patient mildly tachypneic. Heart tones distant. Audible secretions noted in upper air way. Abdomen round, bowel sounds absent. Indwelling ferguson catheter draining melonie urine. 22g RAC IV noted to have redness around site. 24g IV noted to [facility] with dried drainage around site. 22g IV noted to right AC. Sq lines noted to TAVON and right thigh. Assessment complete, see flow sheet. Bed alarm on, bed in lowest position, call bell within reach. 2000- New 24g IV inserted into right hand after two attempts. 2002- Patient noted to have audible upper air way secretions. Patient medicated with IV prn robinul, see MAR. [place] CNA at bedside to obtain vitals    2105- Patient noted to be tachypneic and have facial grimacing with movement. Patient medicated with prn lorazepam and dilaudid, see MAR.     2139- Patients daughter Joel Nieto called for update, gave permission for Aleksandr BRICE to have all updates on patient. Joel Nieto reports patient does not tolerate turning on right side. 2210- Patient resting in bed with eyes closed. Respirations shallow and irregular. Patient continues with audible upper airway secretions. 2337- Patient medicated with scheduled lorazepam and dilaudid, see MAR. Moderate amount of secretions suction from mouth. Patient appears unresponsive to painful stimuli or movement. 6302- Patient lying in bed with eyes closed. Respirations shallow and irregular. Audible upper air secretions noted. 0130- Patient resting in bed with eyes closed and neutral facial expression. Respiration less labored. 2796- Patient given full bed bath with assistance of [place] CNA. Patient becoming agitated, moaning with facial grimacing with movement or touch. Patient medicated with scheduled medications, see MAR. Patient has moderate brown liquid stool. Patient turned and repositioned back on left side. Upper and lower extremities elevated. Large amount of yellow/tan secretions suctioned from mouth.     0325- 22g RAC IV removed due redness and possible infiltration, cath intact. Patient medicated with prn IV robinul, see MAR for secretions. 0440- New arm band applied to right wrist. Patient resting in bed with eyes closed. Respirations shallow and irregular. Audible secretions noted. 0535- 200ml dark melonie/tea color urine emptied from indwelling Grant catheter. 6442- Patient medicated with scheduled medication, see MAR.                    NAME OF PATIENT:  Leonard Rivera    LEVEL OF CARE:  GIP    REASON FOR GIP:   Pain, despite numerous changes in medications, Medication adjustment that must be monitored 24/7, and Stabilizing treatment that cannot take place at home    *PATIENT REMAINS ELIGIBLE FOR GIP LEVEL OF CARE AS EVIDENCED BY: (MUST BE ADDRESSED OF PATIENT GIP)      REASON FOR RESPITE:  N/a    O2 SAFETY:  N/a    FALL INTERVENTIONS PROVIDED:   Implemented/recommended use of non-skid footwear, Implemented/recommended use of fall risk identification flag to all team members, Implemented/recommended assistive devices and encouraged their use, Implemented/recommended resources for alarm system (personal alarm, bed alarm, call bell, etc.) , Implemented/recommended environmental changes (remove hazards, lower bed, improve lighting, etc.), and Implemented/recommended increased supervision/assistance    INTERDISPLINARY COMMUNICATION/COLLABORATION:  Physician, CASSANDRA, Savannah Beavers, and RNLEONOR    NEW MEDICATION INITIATION DOCUMENTATION:  N/a    Reason medication is being initiated:  n/a    MD / Provider name consulted re: change in status / initiation of new medication:  n/a    New Symptom(s):  n/a    New Order(s):  n/a    Name of the person notified of the changes:  n/a    Name of person being taught:  n/a    Instructions given:  n/a    Side Effects taught:  n/a    Response to teaching:  n/a      COMFORTABLE DYING MEASURE:  Is Patient/family satisfied with symptom level?  yes    DISCHARGE PLAN:  Patient likely to pass at [facility], if symptoms can be managed family and MSW will work on placement.

## 2023-03-17 NOTE — PROGRESS NOTES
1900: [facility] [place] Patient resting in bed, eyes closed. Daughter Luis Alberto Day at bedside. White board updated. 1933: CNA in room, vitals taken, documented. 2006: Patient with temp 100.3, has audible secretions. PRN toradol and robinul given. Assessment completed, documented. Rhonchi heard bilaterally. 2040: Patient given bed bath, ferguson care, lotion applied, pads and gown changed. Patient had small bowel movement, loose. Tolerates activity fair with moaning and stating \"help me, help me\". Patient turned on left side, suction set up in room. Light suction of oral cavity complete. 2140: Patient resting in bed on left side, audible secretions heard. Daughter Luis Alberto Day at bedside. 2230: Patient in bed, eyes closed, neutral facial expression. 2257: Patient in room repeating \"help me, help me, help me\". Does not open eyes to voice or stimulation, does not answer questions. Scheduled dilaudid and ativan given. Daughter Luis Alberto Day asks that patient be checked for incontinence, patient dry, no bowel movement, ferguson draining. 2350: Patient resting in bed, eyes closed, neutral facial expression, body relaxed. Gee Day appears to be sleeping on couch in room. 0030: Patient in bed, eyes closed, unlabored breathing. 0130: Patient sleeping, recheck of temperature, now 98.2. Audible secretions noted. 2044: PRN robinul given and scheduled phenobarbital given. Patient with small bowel movement, loose. Incontinence care, pad changed, patient turned to right side. Visible secretions suctioned from mouth. Patient tolerates fair with moaning. 0245: Patient in bed on right side. Scheduled dilaudid and ativan given. Audible secretions noted, too deep to suction. Head of bed elevated slightly, with movement of bed patient repeats \"hurt hurt hurt\". Patient calms quickly after movement stops. 0330: Patient resting in bed on right side, daughter appears to be asleep on couch in room.   0410: Patient qiana Edmonds uses call bell, states patient looks very uncomfortable. Upon entering room, patient is moaning, facial grimacing noted. Patient feels warm to touch, axillary temp is 101. 2. PRN dilaudid, ativan, and toradol administered. Patient has loose bowel movement. Incontinence care, pad changed. Patient with intermittent cough when turned, able to suction thick secretions. 0440: Patient on left side, neutral facial expression, body relaxed. 0510: Recheck of temperature, 99.9 axillary. Patient resting quietly on left side, unlabored respirations. 0600: Patient resting quietly on left side. Appears to breathe better when on left side compared to right. 9390: Patient in bed, eyes closed, qiana Edmonds appears to be asleep on couch in room. Scheduled dilaudid and ativan given. 0700: Report given to oncoming RN. NAME OF PATIENT:  Leonard Rivera    LEVEL OF CARE:  GIP    REASON FOR GIP:   Pain, despite numerous changes in medications, Medication adjustment that must be monitored 24/7, and Stabilizing treatment that cannot take place at home    *PATIENT REMAINS ELIGIBLE FOR GIP LEVEL OF CARE AS EVIDENCED BY: Patient with uncontrolled pain and restlessness, receiving IV medications as cannot tolerate oral medications. Medications needing adjustments and patient needed frequent nursing assessments that cannot take place in the home setting.        REASON FOR RESPITE:  N/A    O2 SAFETY:  N/A    FALL INTERVENTIONS PROVIDED:   Implemented/recommended use of non-skid footwear, Implemented/recommended use of fall risk identification flag to all team members, Implemented/recommended assistive devices and encouraged their use, Implemented/recommended resources for alarm system (personal alarm, bed alarm, call bell, etc.) , Implemented/recommended environmental changes (remove hazards, lower bed, improve lighting, etc.), and Implemented/recommended increased supervision/assistance    INTERDISPLINARY COMMUNICATION/COLLABORATION:  Physician, CASSANDRA, Carine Knox, and RN, CNA    NEW MEDICATION INITIATION DOCUMENTATION:  N/A    Reason medication is being initiated:  N/A    MD / Provider name consulted re: change in status / initiation of new medication: N/A    New Symptom(s):  N/A    New Order(s):  N/A    Name of the person notified of the changes:  N/A    Name of person being taught:  N/A    Instructions given:  N/A    Side Effects taught:  N/A    Response to teaching:  N/A      COMFORTABLE DYING MEASURE:  Is Patient/family satisfied with symptom level?  yes    DISCHARGE PLAN:  Patient will likely  at [facility]. Should symptoms stabilize, patient will discharge home with home hospice services.

## 2023-03-17 NOTE — PROGRESS NOTES
Problem: Pressure Injury - Risk of  Goal: *Prevention of pressure injury  Description: Document Reji Scale and appropriate interventions in the flowsheet. Outcome: Progressing Towards Goal  Note: Pressure Injury Interventions:  Sensory Interventions: Assess changes in LOC, Check visual cues for pain, Float heels, Keep linens dry and wrinkle-free, Minimize linen layers, Pad between skin to skin, Pressure redistribution bed/mattress (bed type)    Moisture Interventions: Absorbent underpads, Apply protective barrier, creams and emollients, Internal/External urinary devices, Maintain skin hydration (lotion/cream), Minimize layers, Moisture barrier    Activity Interventions: Pressure redistribution bed/mattress(bed type)    Mobility Interventions: Float heels, HOB 30 degrees or less, Pressure redistribution bed/mattress (bed type)    Nutrition Interventions:  (NPO)    Friction and Shear Interventions: HOB 30 degrees or less, Lift sheet, Minimize layers                Problem: Pain  Goal: Assess satisfaction of level of comfort and symptom control  Outcome: Progressing Towards Goal  Goal: *Control of acute pain  Outcome: Progressing Towards Goal     Problem: Anxiety/Agitation  Goal: Verbalize or staff assess the ability to manage anxiety  Description: The patient/family/caregiver will verbalize and demonstrate ability to manage the patient's anxiety throughout hospice care.   Outcome: Progressing Towards Goal     Problem: Infection - Risk of, Urinary Catheter-Associated Urinary Tract Infection  Goal: *Absence of infection signs and symptoms  Outcome: Progressing Towards Goal

## 2023-03-17 NOTE — PROGRESS NOTES
..  0700:Report received from [address] [place] [address] using SBAR I/O and [facility] Pt is unresponsive to verbal and tactile stimuli, audible secretions noted elevated HOB mouth care done. 08:40:Adm PRN TORADOL ROBINUL and lorazepam for fever restlessness and secretions. 10:02:Adm schedule Phenobarbital, dtr and  at bedside, updated on care, emotional support given. 11:30:Adm schedule dilaudid and lorazepam, repositioned for comfort mouth care done. 12:37:Pt resting more comfortable, no facial grimace or restlessness noted. 13:30:Pt resp is shallow with periods of apnea audible congestion noted suctioning done. 14:35:Repositioned for comfort mouth care done KIRSTEN HOGUE RN visited. 15:47:Adm schedule comfort meds, with PRN ROBINUL for increase secretions  17:15:Shallow resp noted with periods of apnea HOB elevated. 18:16:Adm schedule comfort meds, pillows repositioned. 1900:Report given to [facility].               NAME OF PATIENT:      LEVEL OF CARE:GIP  REASON FOR GIP: Pain and resp      *PATIENT REMAINS ELIGIBLE FOR GIP LEVEL OF CARE AS EVIDENCED BY:Pain restlessness and increase secretions   REASON FOR RESPITE:n/a       O2 SAFETY:  Concentrator positioning (6\" from furniture/drapes), Tanks stored in ledesma , No petroleum based products on face while oxygen in use and Oxygen sign on the door     FALL INTERVENTIONS PROVIDED:   Implemented/recommended use of fall risk identification flag to all team members, Implemented/recommended assistive devices and encouraged their use, Implemented/recommended resources for alarm system (personal alarm, bed alarm, call bell, etc.)  and Implemented/recommended environmental changes (remove hazards, lower bed, improve lighting, etc.)     INTERDISPLINARY COMMUNICATION/COLLABORATION:  Physician, MSW, Aaron and RN, CNA     NEW MEDICATION INITIATION DOCUMENTATION:N/A      Reason medication is being initiated:  N/A     MD / Provider name consulted re: change in status / initiation of new medication:  N/A     New Symptom(s):  N/A     New Order(s):  N/A     Name of the person notified of the changes:  N/A     Name of person being taught:  N/A     Instructions given:  N/A     Side Effects taught:  N/A     Response to teaching:  N/A        COMFORTABLE DYING MEASURE:  Is Patient/family satisfied with symptom level?  yes     DISCHARGE PLAN:Pt will remain at [facility] until he passes.

## 2023-03-18 NOTE — PROGRESS NOTES
Problem: Pressure Injury - Risk of  Goal: *Prevention of pressure injury  Description: Document Reji Scale and appropriate interventions in the flowsheet. Outcome: Progressing Towards Goal  Note: Pressure Injury Interventions:  Sensory Interventions: Assess changes in LOC, Check visual cues for pain, Float heels, Keep linens dry and wrinkle-free, Maintain/enhance activity level, Minimize linen layers, Monitor skin under medical devices, Pad between skin to skin    Moisture Interventions: Absorbent underpads, Apply protective barrier, creams and emollients, Check for incontinence Q2 hours and as needed, Internal/External urinary devices, Limit adult briefs, Maintain skin hydration (lotion/cream), Minimize layers, Moisture barrier    Activity Interventions: Pressure redistribution bed/mattress(bed type)    Mobility Interventions: HOB 30 degrees or less, Float heels, Pressure redistribution bed/mattress (bed type)    Nutrition Interventions: Document food/fluid/supplement intake (npo)    Friction and Shear Interventions: Apply protective barrier, creams and emollients, HOB 30 degrees or less                Problem: Risk for Falls  Goal: Free of falls during inpatient stay  Description: Patient will be free of falls during inpatient stay. Outcome: Progressing Towards Goal     Problem: Pain  Goal: Assess satisfaction of level of comfort and symptom control  Outcome: Progressing Towards Goal     Problem: Anxiety/Agitation  Goal: Verbalize or staff assess the ability to manage anxiety  Description: The patient/family/caregiver will verbalize and demonstrate ability to manage the patient's anxiety throughout hospice care.   Outcome: Progressing Towards Goal     Problem: Infection - Risk of, Urinary Catheter-Associated Urinary Tract Infection  Goal: *Absence of infection signs and symptoms  Outcome: Progressing Towards Goal     Problem: Infection - Risk of, Urinary Catheter-Associated Urinary Tract Infection  Goal: *Absence of infection signs and symptoms  Outcome: Progressing Towards Goal     Problem: Falls - Risk of  Goal: *Absence of Falls  Description: Document Andrea Frye Fall Risk and appropriate interventions in the flowsheet.   Outcome: Progressing Towards Goal  Note: Fall Risk Interventions:            Medication Interventions: Bed/chair exit alarm    Elimination Interventions: Bed/chair exit alarm, Call light in reach

## 2023-03-18 NOTE — PROGRESS NOTES
..  0700:Report received from [facility] and [facility] Pt is unresponsive to verbal and tactile stimuli assessment done in flow sheet. 0800:Suctioning done for increase secretions  08:42:Temp 99.1 adm PRN TORADOL and ROBINUL for increase secretions. 10:35:Adm schedule dilaudid lorazepam and phenobarbital for grimace and moan when turned, suctioning done for secretions. 11:10:Pt resp is shallow HOB elevated comfort and safety maintained. 12:35:Family at bedside updated on care, pt resp is shallow mouth care done. 13:40:Pillows repositioned, pt continues  to breathe shallow no PRN's required at this time. 15:41:Adm schedule comfort meds, family at bedside emotional support given. 16:20:Pt resting comfortable mouth care done. 17:26:Mouth care done, pillows repositioned, no facial grimace noted. 18:07:Adm schedule comfort meds, shallow resp noted with periods of apnea. 1900:Report given to Mary Jane Torres RN        NAME OF PATIENT:      LEVEL OF CARE: GIP  REASON FOR GIP:Pain resp distress and increase secretions. *PATIENT REMAINS ELIGIBLE FOR GIP LEVEL OF CARE AS EVIDENCED BY:Pain resp distress and increase secretions requiring iv meds and frequent nursing assessments.    REASON FOR RESPITE:n/a     O2 SAFETY:  Concentrator positioning (6\" from furniture/drapes), Tanks stored in ledesma , No petroleum based products on face while oxygen in use and Oxygen sign on the door     FALL INTERVENTIONS PROVIDED:   Implemented/recommended use of fall risk identification flag to all team members, Implemented/recommended assistive devices and encouraged their use, Implemented/recommended resources for alarm system (personal alarm, bed alarm, call bell, etc.)  and Implemented/recommended environmental changes (remove hazards, lower bed, improve lighting, etc.)     INTERDISPLINARY COMMUNICATION/COLLABORATION:  Physician, MSW, Aaron and RN, CNA     NEW MEDICATION INITIATION DOCUMENTATION:N/A      Reason medication is being initiated:  N/A     MD / Provider name consulted re: change in status / initiation of new medication:  N/A     New Symptom(s):  N/A     New Order(s):  N/A     Name of the person notified of the changes:  N/A     Name of person being taught:  N/A     Instructions given:  N/A     Side Effects taught:  N/A     Response to teaching:  N/A        COMFORTABLE DYING MEASURE:  Is Patient/family satisfied with symptom level?  yes     DISCHARGE PLAN:Pt will remain at [facility] until he passes.

## 2023-03-18 NOTE — PROGRESS NOTES
[facility] Help to Those in Need  [phone]    Patient Name: Carrie Madrid  YOB: 1945    Date of Provider Hospice Visit: 03/18/23    Level of Care:   [x] General Inpatient (GIP)    [] Routine   [] Respite    Current Location of Care:  [] [facility] [] [facility] [] [facility] [] [facility] [x] [facility], patient referred from:  [] [facility] [] [facility] [] [facility] [] [facility] [x] Home [] Other:       Principle Hospice Diagnosis: Metastatic renal cell cancer  Diagnoses RELATED to the terminal prognosis: Cancer associated pain, probable terminal delirium  Other Diagnoses:      Anmol Perez is a 70y.o. year old who was admitted to [facility]. Patient is a 82-year-old male being followed by [facility] secondary to metastatic renal cell cancer. Patient apparently had a very difficult week as I reviewed the chart sent by KIRSTEN HOGUE as well as an emergency room visit on 3/4/2023. Patient apparently is having significant increasing pain issues despite multiple medication adjustments. In reviewing the chart, patient had been on MS Contin 30 mg 3 times a day, fentanyl 75 mcg every 72 hours, along with morphine 5 mg every hour as needed for breakthrough pain. It appears on 3/10/2023, medications were adjusted to include fentanyl 100 mcg every 72 hours, methadone 10 mg 3 times a day and Dilaudid 2 mg every 2 hours as needed for breakthrough pain. Despite this adjustment, apparently patient remained in crisis with both pain and restlessness at home. Patient subsequently sent to the community hospice house for [facility] level care.   There is no family present but apparently daughter was caring for him and understands that he is transitioning towards end of life and wants to make sure he remains comfortable    The patient's principle diagnosis has resulted in pain, agitation  Refer to LCD     Functionally, the patient's Karnofsky and/or Palliative Performance Scale has declined over a period of weeks and is estimated at 10. The patient is dependent on the following ADLs:    Objective information that support this patients limited prognosis includes: See note above. The patient/family chose comfort measures with the support of Hospice. HOSPICE DIAGNOSES   Active Symptoms:  1. Cancer associated pain  2. Suspect terminal delirium/agitation  3. 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 Hospice care     PLAN   Patient will continue GIP LOC as needs frequent nursing assessments, IV medication management for symptom control to focus on comfort. Pain management-continue Dilaudid 12 mg IV every 4 hours and every 15 minutes as needed for pain and or shortness of breath. DAfter the adjustments yesterday, patient only needed 1 as needed dose. no prn use of medication   Terminal delirium/agitation-continue the combination of Ativan 4 mg IV every 4 hours and phenobarbital 65 mg subcutaneous every 8 hours. Comfortable no medication adjustment required today. Comfort meds for all other symptoms  Plan reviewed with bedside nursing team and we will review the plan with [facility] hospice nurse when they arrive  Psychosocial : Nurse qiana Langley and son in law at bed side. Daughter notes he is comfortable, not labored as last night, she asked \" how much time \"  we discussed hours to day range . She feels supported with  and  .  and SW to support family needs  Disposition: Death at the hospice house  Hospice Plan of care was reviewed in detail and agree with current plan of care    Prognosis estimated based on 03/18/23 clinical assessment is:   [x] Hours to Days    [] Days to Weeks    [] Other:    Communicated plan of care with: Hospice Case Manager; Hospice IDT; Care Team     GOALS OF CARE     Patient/Medical POA stated Goal of Care: Hospice care    [x] I have reviewed and/or updated ACP information in the Advance Care Planning Navigator.  This information is available in the [address] link in the patient's chart header. Primary Decision [facility] Agent):   Primary Decision Maker: Rusty Herron - Daughter - [phone]    Secondary Decision Maker: Mohamudvidhi Ventura (stepdtr) - Child - [phone]    Resuscitation Status: DNR  If DNR is there a Durable DNR on file? : [x] Yes [] No (If no, complete Durable DNR)    HISTORY     History obtained from: Chart, daughter, son Jaci Dunn: unresponsive  The patient is:   [] Verbal  [x] Nonverbal  [] Unresponsive    HPI/SUBJECTIVE: Patient really cannot verbalize much but definitely significant moaning especially with movement    3/15-minimally responsive but much more comfortable. No grimacing or moaning this morning    3/16-patient much more restless today. He is having evidence of moaning/grimacing even yelling out help at times. 3/17-patient appears much calmer this morning. No grimacing or moaning. No yelling out this morning. Still occasional secretions to the point where he needs some mild suctioning last night. He had 5 as needed doses of Robinul in the last 24 hours.   Patient also with low-grade fever and required 2 doses of Toradol    3/18/23; patient appears comfortable, daughter and son in law at bed side        REVIEW OF SYSTEMS     The following systems were: [] reviewed  [x] unable to be reviewed    Positive ROS include:  Constitutional: fatigue, weakness, in pain, short of breath  Ears/nose/mouth/throat: increased airway secretions  Respiratory:shortness of breath, wheezing  Gastrointestinal:poor appetite, nausea, vomiting, abdominal pain, constipation, diarrhea  Musculoskeletal:pain, deformities, swelling legs  Neurologic:confusion, hallucinations, weakness  Psychiatric:anxiety, feeling depressed, poor sleep  Endocrine:     Adult Non-Verbal Pain Assessment Score: 3    Face  [] 0   No particular expression or smile  [x] 1   Occasional grimace, tearing, frowning, wrinkled forehead  [] 2   Frequent grimace, tearing, frowning, wrinkled forehead    Activity (movement)  [] 0   Lying quietly, normal position  [x] 1   Seeking attention through movement or slow, cautious movement  [] 2   Restless, excessive activity and/or withdrawal reflexes    Guarding  [x] 0   Lying quietly, no positioning of hands over areas of body  [] 1   Splinting areas of the body, tense  [] 2   Rigid, stiff    Physiology (vital signs)  [x] 0   Stable vital signs  [] 1   Change in any of the following: SBP > 20mm Hg; HR > 20/minute  [] 2   Change in any of the following: SBP > 30mm Hg; HR > 25/minute    Respiratory  [] 0   Baseline RR/SpO2, compliant with ventilator  [x] 1   RR > 10 above baseline, or 5% drop SpO2, mild asynchrony with ventilator  [] 2   RR > 20 above baseline, or 10% drop SpO2, asynchrony with ventilator     FUNCTIONAL ASSESSMENT     Palliative Performance Scale (PPS): 10       PSYCHOSOCIAL/SPIRITUAL ASSESSMENT     Active Problems:    * No active hospital problems. *    Past Medical History:   Diagnosis Date    Chronic kidney disease     Stage 2    Diabetes mellitus with stage 2 chronic kidney disease (HCC)     High cholesterol     Morbid obesity with BMI of 40.0-44.9, adult ([facility] [address]     MRSA infection     Abcess on abdomen    Sleep apnea with use of continuous positive airway pressure (CPAP)     Tinnitus aurium, bilateral       Past Surgical History:   Procedure Laterality Date    HX CERVICAL FUSION N/A     HX CHOLECYSTECTOMY      HX COLONOSCOPY N/A     HX CYST INCISION AND DRAINAGE N/A     Cyst on abdomen    HX LUMBAR FUSION N/A 2019    L 4-S1 Lami & L4-5 Fusion    HX TONSILLECTOMY N/A       Social History     Tobacco Use    Smoking status: Former     Packs/day: 1.50     Years: 20.00     Pack years: 30.00     Types: Cigarettes     Quit date:      Years since quittin.2    Smokeless tobacco: Never   Substance Use Topics    Alcohol use:  Yes     Alcohol/week: 5.0 standard drinks     Types: 1 Glasses of wine, 2 Cans of beer, 2 Shots of liquor per week     Family History   Problem Relation Age of Onset    Pneumonia Mother     Heart Attack Father 45    Heart Surgery Sister 76        CABG    No Known Problems Sister       No Known Allergies   Current Facility-Administered Medications   Medication Dose Route Frequency    LORazepam (ATIVAN) injection 4 mg  4 mg IntraVENous Q4H    HYDROmorphone (DILAUDID) injection 12 mg  12 mg IntraVENous Q4H    HYDROmorphone (DILAUDID) injection 12 mg  12 mg IntraVENous Q15MIN PRN    LORazepam (ATIVAN) injection 4 mg  4 mg IntraVENous Q15MIN PRN    PHENobarbital (LUMINAL) injection 65 mg  65 mg SubCUTAneous Q8H    ketorolac (TORADOL) injection 30 mg  30 mg IntraVENous Q6H PRN    bisacodyL (DULCOLAX) suppository 10 mg  10 mg Rectal DAILY PRN    acetaminophen (TYLENOL) suppository 650 mg  650 mg Rectal Q4H PRN    glycopyrrolate (ROBINUL) injection 0.2 mg  0.2 mg IntraVENous Q4H PRN        PHYSICAL EXAM     Wt Readings from Last 3 Encounters:   03/04/23 220 lb (99.8 kg)   10/21/22 220 lb (99.8 kg)   01/07/19 266 lb 8.6 oz (120.9 kg)       Visit Vitals  BP (!) 107/50 (BP 1 Location: Right lower arm, BP Patient Position: At rest;Supine)   Pulse 85   Temp 99.1 °F (37.3 °C)   Resp 20   SpO2 (!) 71%       Supplemental O2  [] Yes  [x] NO  Last bowel movement:     Currently this patient has:  [x] Peripheral IV [] PICC  [] PORT [] ICD    [x] Grant Catheter [] NG Tube   [] PEG Tube    [] Rectal Tube [] Drain  [x] Other: Subcutaneous    Constitutional: Obese buit,Appears much more comfortable, no grimacing, no furrowing of the brow.   Eyes: Closed  ENMT: Dry  Cardiovascular: rrr  Respiratory: No significant work of breathing, no accessory muscle use, secretions /rattling   Gastrointestinal: Soft  Musculoskeletal: Unremarkable  Skin: warm extremities  Neurologic: unresponsive  Psychiatric:   Other:       Pertinent Lab and or Imaging Tests:  Lab Results Component Value Date/Time    Sodium 140 10/25/2022 04:14 AM    Potassium 3.7 10/25/2022 04:14 AM    Chloride 108 10/25/2022 04:14 AM    CO2 27 10/25/2022 04:14 AM    Anion gap 5 10/25/2022 04:14 AM    Glucose 153 (H) 10/25/2022 04:14 AM    BUN 27 (H) 10/25/2022 04:14 AM    Creatinine 1.25 10/25/2022 04:14 AM    BUN/Creatinine ratio 22 (H) 10/25/2022 04:14 AM    GFR est AA >60 01/11/2019 06:07 AM    GFR est non-AA >60 01/11/2019 06:07 AM    Calcium 8.6 10/25/2022 04:14 AM     Lab Results   Component Value Date/Time    Protein, total 7.2 10/21/2022 11:17 AM    Albumin 3.4 (L) 10/21/2022 11:17 AM           Total time: 35 mins  Counseling / coordination time:   > 50% counseling / coordination?:

## 2023-03-19 NOTE — PROGRESS NOTES
Problem: Pressure Injury - Risk of  Goal: *Prevention of pressure injury  Description: Document Reji Scale and appropriate interventions in the flowsheet. Outcome: Progressing Towards Goal  Note: Pressure Injury Interventions:  Sensory Interventions: Assess changes in LOC, Check visual cues for pain, Float heels, Keep linens dry and wrinkle-free, Minimize linen layers, Pad between skin to skin, Monitor skin under medical devices, Pressure redistribution bed/mattress (bed type)    Moisture Interventions: Absorbent underpads, Apply protective barrier, creams and emollients, Check for incontinence Q2 hours and as needed, Internal/External urinary devices, Limit adult briefs, Maintain skin hydration (lotion/cream), Minimize layers, Moisture barrier    Activity Interventions: Increase time out of bed, Pressure redistribution bed/mattress(bed type)    Mobility Interventions: HOB 30 degrees or less, Pressure redistribution bed/mattress (bed type), Float heels    Nutrition Interventions: Document food/fluid/supplement intake    Friction and Shear Interventions: Apply protective barrier, creams and emollients, HOB 30 degrees or less, Minimize layers                Problem: Risk for Falls  Goal: Free of falls during inpatient stay  Description: Patient will be free of falls during inpatient stay. Outcome: Progressing Towards Goal     Problem: Pain  Goal: Assess satisfaction of level of comfort and symptom control  Outcome: Progressing Towards Goal     Problem: Anxiety/Agitation  Goal: Verbalize or staff assess the ability to manage anxiety  Description: The patient/family/caregiver will verbalize and demonstrate ability to manage the patient's anxiety throughout hospice care.   Outcome: Progressing Towards Goal     Problem: Infection - Risk of, Urinary Catheter-Associated Urinary Tract Infection  Goal: *Absence of infection signs and symptoms  Outcome: Progressing Towards Goal     Problem: End of Life Process  Goal: Demonstrate understanding of end of life processes  Description: Patient/caregiver will understand end of life processes. Outcome: Progressing Towards Goal     Problem: Infection - Risk of, Urinary Catheter-Associated Urinary Tract Infection  Goal: *Absence of infection signs and symptoms  Outcome: Progressing Towards Goal     Problem: Falls - Risk of  Goal: *Absence of Falls  Description: Document Teresa Fall Risk and appropriate interventions in the flowsheet.   Outcome: Progressing Towards Goal  Note: Fall Risk Interventions:            Medication Interventions: Bed/chair exit alarm    Elimination Interventions: Bed/chair exit alarm, Call light in reach

## 2023-03-19 NOTE — PROGRESS NOTES
1900- Report received from [address] [place] [address]- Patient resting in bed on left side with eyes closed. Respirations shallow and unlabored. [address] CNA at bedside to obtain vitals. 2005- Patient lying in bed with eyes closed. Patient responsive to movement or painful stimuli. Skin warm and pale. Lips dusky in color. Minimal amount audible secretions noted in upper air way. Scattered Course crackles noted throughout all fields to auscultation. Heart tones distant and irregular. Abdomen round and soft. Bowel sounds absent. Indwelling ferguson catheter draining tea colored urine. Subq lines noted to right upper arm and right thigh. Raised area noted around site on thigh. 24g IVS noted to left and right hand. Old drainage noted under dressing on left hand. DTI to left ear,right heel. Assessment complete, see flow sheet. Bed alarm on, bed in lowest position, call bell within reach. 2105- Patients daughter Rose Mary Samuel called for an update. 2120- Patient resting in bed with eyes closed and neutral facial expression. Respiration shallow and unlabored. Dionisio Hernandez CNA at bedside to provide patient with bed bath. Patient noted to have moderate brown watery stool. Patient turned and repositioned with pillows on right side. Patient noted to have grimacing with turning but able to tolerate change of position. Mouth care provided. Upper and lower extremities elevated on pillows. 2321- Patient medicated with IV dilaudid, lorazepam and prn robinul, see MAR.     0031- Patient resting in bed on right side with eyes closed. Respirations course and shallow. 3720- Patient lying quietly in bed with eyes closed. Respirations shallow with periods of apnea. 4394- Patient medicated with scheduled medications, see MAR. 22g subq line removed from right upper thigh due to swelling around site. 0305- 22g IV inserted LAC without difficulty. 0308- 22g sub q line inserted into outer aspect of DERRICK. 0757- Patient resting in bed with eyes closed. Respirations shallow and unlabored. 0515- 25ml emptied from indwelling ferguson catheter. 8013- Patient medicated with scheduled IV lorazepam, dilaudid and prn robinul, see MAR.   0630- Patient ax temp 100.3, patient medicated with prn Toradol, see MAR. Patient turned and repositioned on left side. Upper and lower elevated on pillows. 0700- Report given Stormy MACKAY    NAME OF PATIENT:  Leonard Rivera    LEVEL OF CARE:  GIP    REASON FOR GIP:   Pain, despite numerous changes in medications, Medication adjustment that must be monitored 24/7, and Stabilizing treatment that cannot take place at home    *PATIENT REMAINS ELIGIBLE FOR GIP LEVEL OF CARE AS EVIDENCED BY: (MUST BE ADDRESSED OF PATIENT GIP)  Patient requires frequent nursing assessment, monitoring and IV medications for symptom management of pain, agitation and secretions.      REASON FOR RESPITE:  N/a    O2 SAFETY:  N/a    FALL INTERVENTIONS PROVIDED:   Implemented/recommended use of fall risk identification flag to all team members, Implemented/recommended assistive devices and encouraged their use, Implemented/recommended resources for alarm system (personal alarm, bed alarm, call bell, etc.) , Implemented/recommended environmental changes (remove hazards, lower bed, improve lighting, etc.), and Implemented/recommended increased supervision/assistance    INTERDISPLINARY COMMUNICATION/COLLABORATION:  Physician, CASSANDRA, Katt Willoughby, and RNLEONOR    NEW MEDICATION INITIATION DOCUMENTATION:  N/a    Reason medication is being initiated:  n/a    MD / Provider name consulted re: change in status / initiation of new medication:  n/a    New Symptom(s):  n/a    New Order(s):  n/a    Name of the person notified of the changes:  n/a    Name of person being taught:  n/a    Instructions given:  n/a    Side Effects taught:  n/a    Response to teaching:  n/a      COMFORTABLE DYING MEASURE:  Is Patient/family satisfied with symptom level?  yes    DISCHARGE PLAN:  Patient likely to pass at [facility], if symptoms can be managed patient to return home.

## 2023-03-19 NOTE — PROGRESS NOTES
Problem: Pressure Injury - Risk of  Goal: *Prevention of pressure injury  Description: Document Reji Scale and appropriate interventions in the flowsheet. Outcome: Progressing Towards Goal  Note: Pressure Injury Interventions:  Sensory Interventions: Check visual cues for pain, Float heels, Avoid rigorous massage over bony prominences, Assess changes in LOC, Monitor skin under medical devices, Pressure redistribution bed/mattress (bed type), Keep linens dry and wrinkle-free, Minimize linen layers, Pad between skin to skin    Moisture Interventions: Absorbent underpads, Internal/External urinary devices, Maintain skin hydration (lotion/cream), Moisture barrier, Minimize layers, Limit adult briefs    Activity Interventions: Pressure redistribution bed/mattress(bed type)    Mobility Interventions: Float heels, Pressure redistribution bed/mattress (bed type)    Nutrition Interventions: Document food/fluid/supplement intake    Friction and Shear Interventions: Apply protective barrier, creams and emollients, HOB 30 degrees or less, Lift sheet, Minimize layers                Problem: Patient Education: Go to Patient Education Activity  Goal: Patient/Family Education  Outcome: Progressing Towards Goal     Problem: Risk for Falls  Goal: Free of falls during inpatient stay  Description: Patient will be free of falls during inpatient stay. Outcome: Progressing Towards Goal     Problem: Pain  Goal: Assess satisfaction of level of comfort and symptom control  Outcome: Progressing Towards Goal  Goal: *Control of acute pain  Outcome: Progressing Towards Goal     Problem: Anxiety/Agitation  Goal: Verbalize or staff assess the ability to manage anxiety  Description: The patient/family/caregiver will verbalize and demonstrate ability to manage the patient's anxiety throughout hospice care.   Outcome: Progressing Towards Goal     Problem: Infection - Risk of, Urinary Catheter-Associated Urinary Tract Infection  Goal: *Absence of infection signs and symptoms  Outcome: Progressing Towards Goal     Problem: End of Life Process  Goal: Demonstrate understanding of end of life processes  Description: Patient/caregiver will understand end of life processes. Outcome: Progressing Towards Goal     Problem: Infection - Risk of, Urinary Catheter-Associated Urinary Tract Infection  Goal: *Absence of infection signs and symptoms  Outcome: Progressing Towards Goal     Problem: Falls - Risk of  Goal: *Absence of Falls  Description: Document Teresa Fall Risk and appropriate interventions in the flowsheet.   Outcome: Progressing Towards Goal  Note: Fall Risk Interventions:            Medication Interventions: Bed/chair exit alarm    Elimination Interventions: Bed/chair exit alarm, Call light in reach              Problem: Patient Education: Go to Patient Education Activity  Goal: Patient/Family Education  Outcome: Progressing Towards Goal

## 2023-03-19 NOTE — PROGRESS NOTES
0700: Received report from [facility]. Patient resting in bed with eyes closed, appears sleeping. 0730: Patient assessment complete, documented in flowsheets. Patient remained with eyes closed, and not interactive, during assessment. Lips and nailbeds are cyanotic. Radial pulse palpable on right, not palpable on right  0830: Patient is resting quietly with eyes closed, appears sleeping. Neutral facial position observed. Respirations are regular depth and rhythm. Mild secretions audible at the bedside  0930: Patient remains resting with eyes closed, neutral facial expression observed. Respiratory secretions audible at the bedside. 1055: Scheduled hydromorphone, lorazepam, and phenobarbitol administered. Patient resting quietly with eyes closed, unresponsive during care. Patient's daughter and son-in-law at the bedside. 1215: Patient resting quietly with eyes closed, family at the bedside. 1315: Patient resting quietly with eyes closed, daughter at the bedside  1415:  Patient repositioned with Kavitha Young CNA. Patient's eyes partially opened with movement. Patient's face/eyes washed for crusting around eyes. 1515: Patient resting quietly in bed with eyes closed, respirations are regular depth and rhythm, patient not responsive to voice or touch. Scheduled hydromorphone 12mg and lorazepam 4mg IV administered. 1615: Patient resting in bed with eyes closed, nailbeds, nose, and lips are cyanotic. Respirations are regular depth and rhythm. 1730: Patient remains resting with eyes closed, nailbeds cyanotic, nose cyanosis is decreased, lips are dusky. 1845: Scheduled phenobarbitol, hydromorphone and lorazepam administered. PRN robinul administered for secretions. 1900: Report given to CODIE Coles .

## 2023-03-20 NOTE — PROGRESS NOTES
Problem: Pressure Injury - Risk of  Goal: *Prevention of pressure injury  Description: Document Reji Scale and appropriate interventions in the flowsheet. Outcome: Progressing Towards Goal  Note: Pressure Injury Interventions:  Sensory Interventions: Check visual cues for pain, Float heels, Keep linens dry and wrinkle-free, Minimize linen layers, Monitor skin under medical devices, Pad between skin to skin, Pressure redistribution bed/mattress (bed type)    Moisture Interventions: Absorbent underpads, Apply protective barrier, creams and emollients, Internal/External urinary devices, Maintain skin hydration (lotion/cream), Minimize layers, Moisture barrier, Limit adult briefs    Activity Interventions: Pressure redistribution bed/mattress(bed type)    Mobility Interventions: Float heels, HOB 30 degrees or less, Pressure redistribution bed/mattress (bed type)    Nutrition Interventions: Document food/fluid/supplement intake (npo)    Friction and Shear Interventions: Apply protective barrier, creams and emollients, HOB 30 degrees or less, Minimize layers                Problem: Risk for Falls  Goal: Free of falls during inpatient stay  Description: Patient will be free of falls during inpatient stay. Outcome: Progressing Towards Goal     Problem: Pain  Goal: Assess satisfaction of level of comfort and symptom control  Outcome: Progressing Towards Goal     Problem: Anxiety/Agitation  Goal: Verbalize or staff assess the ability to manage anxiety  Description: The patient/family/caregiver will verbalize and demonstrate ability to manage the patient's anxiety throughout hospice care.   Outcome: Progressing Towards Goal     Problem: Infection - Risk of, Urinary Catheter-Associated Urinary Tract Infection  Goal: *Absence of infection signs and symptoms  Outcome: Progressing Towards Goal     Problem: Falls - Risk of  Goal: *Absence of Falls  Description: Document Teresa Fall Risk and appropriate interventions in the flowsheet.   Outcome: Progressing Towards Goal  Note: Fall Risk Interventions:            Medication Interventions: Bed/chair exit alarm    Elimination Interventions: Bed/chair exit alarm, Call light in reach

## 2023-03-20 NOTE — PROGRESS NOTES
0700: Report received from [facility], [address]: Patient resting in bed with eyes closed, neutral facial expression observed. Nailbeds, nose and lips cyanotic. Slight jaw movement observed with respirations. 0745: Called and spoke with patient's daughter, Deidra Carlos, to make her aware of change in patient's condition. 0815: Patient remains resting quietly with eyes closed, grandson at the bedside,   0845: Patient's daughter at the bedside. Patient's respirations are shallow with slight jaw movement, lips and nose remain cyanotic. Daughter appropriately tearful. 3659: Called to patient's room, patient appears to have stopped breathing. Patient still taking agonal breaths. Remained at bedside with patient and family until patient without signs of life   TIME OF DEATH 3/20/2023 at 46  12 Patient's daughter requesting visit from [facility].    Oris Setting from [place] is aware of patient's death and  is on the way to [facility]

## 2023-03-20 NOTE — PROGRESS NOTES
Problem: Pressure Injury - Risk of  Goal: *Prevention of pressure injury  Description: Document Reji Scale and appropriate interventions in the flowsheet. Outcome: Resolved/Met     Problem: Patient Education: Go to Patient Education Activity  Goal: Patient/Family Education  Outcome: Resolved/Met     Problem: Risk for Falls  Goal: Free of falls during inpatient stay  Description: Patient will be free of falls during inpatient stay. Outcome: Resolved/Met     Problem: Pain  Goal: Assess satisfaction of level of comfort and symptom control  Outcome: Resolved/Met  Goal: *Control of acute pain  Outcome: Resolved/Met     Problem: Anxiety/Agitation  Goal: Verbalize or staff assess the ability to manage anxiety  Description: The patient/family/caregiver will verbalize and demonstrate ability to manage the patient's anxiety throughout hospice care. Outcome: Resolved/Met     Problem: Infection - Risk of, Urinary Catheter-Associated Urinary Tract Infection  Goal: *Absence of infection signs and symptoms  Outcome: Resolved/Met     Problem: End of Life Process  Goal: Demonstrate understanding of end of life processes  Description: Patient/caregiver will understand end of life processes. Outcome: Resolved/Met     Problem: Infection - Risk of, Urinary Catheter-Associated Urinary Tract Infection  Goal: *Absence of infection signs and symptoms  Outcome: Resolved/Met     Problem: Falls - Risk of  Goal: *Absence of Falls  Description: Document Teresa Fall Risk and appropriate interventions in the flowsheet.   Outcome: Resolved/Met     Problem: Patient Education: Go to Patient Education Activity  Goal: Patient/Family Education  Outcome: Resolved/Met

## 2023-03-20 NOTE — PROGRESS NOTES
1900- Report received from [facility] 79- Patient lying in bed with eyes closed. Patient minimally responsive to movement or pain. Skin warm and pale. Patients lips dusky. Scattered crackles throughout all fields to auscultation. Respirations shallow and course. Heart tones irregular and distant. Bowel sounds absent. Indwelling ferguson catheter draining minimal amount tea colored urine. Pressure injury noted to left ear. 22g IV present in Lac, 24g in left hand and bilateral upper arm 24g subq lines. Dressing clean dry and intact. Unable to palpate right radial pulse. Erythema noted to right ac from old IV site, covered with gauze and tape. Assessment complete,see flow sheet. Bed alarm on, bed in lowest position, call bell within reach. 1952-Patients daughter Mat Romo called for status update. 2029-John Mcneil family friend call for status update. Permission received from Mat Romo to provide full updates to [employer]. 2044- Patient resting on left side with eyes closed. Respirations course and shallow. [facility] VIRKA at bedside to provided patient with full bed bath. Patient turned and repositioned on right side with pillows. Patient able to tolerate turn with minimal signs of discomfort. Upper and lower extremities elevated on pillows. Barrier cream applied to sacrum, left ear and right heel. Mouth care provided. 2236- Patient lying in bed with eyes closed with neutral facial expression. Respirations shallow, mild audible secretions noted. 2330- Patient medicated with scheduled IV medications and prn robinul for secretions, see MAR.     2340-New 22g IV inserted into right hand. RemigioLinden- Patient resting on right side with eyes closed. Respiration shallow. 0123- Patient lying in bed with eyes closed. Patient less responsive to painful stimuli or movement. 0776- Patient resting in bed with eyes closed. Respirations shallow and course.      6530- Patient medicated with scheduled IV medication and prn robinul for secretions, see MAR. Patient tachypneic, noted to have increased work of breathing. Patient turned to left side and repositioned with pillows. Upper and lower extremities elevated with pillows. 2759- Patient resting in bed on left side with eyes closed. Respirations short and shallow. 5855- Patient lying on left side with eyes closed. Patient minimally responsive to movement or painful stimuli. Respirations less labored but short and shallow. 0545-25ml melonie colored urine emptied from indwelling ferguson catheter. 6810- Patient medicated with scheduled IV medication, see MAR. Respirations short and shallow with audible secretions. 0700- Report given to [employer] RN      NAME OF PATIENT:  Leonard Rivera    LEVEL OF CARE:  [facility]    REASON FOR GIP:   Pain, despite numerous changes in medications, Medication adjustment that must be monitored 24/7, and Stabilizing treatment that cannot take place at home    *PATIENT REMAINS ELIGIBLE FOR GIP LEVEL OF CARE AS EVIDENCED BY: (MUST BE ADDRESSED OF PATIENT GIP)  Patient requires frequent nursing assessment, monitoring and requires IV medication for symptom management of pain, agitation and secretions.      REASON FOR RESPITE:  N/a    O2 SAFETY:  N/a    FALL INTERVENTIONS PROVIDED:   Implemented/recommended use of fall risk identification flag to all team members, Implemented/recommended assistive devices and encouraged their use, Implemented/recommended resources for alarm system (personal alarm, bed alarm, call bell, etc.) , Implemented/recommended environmental changes (remove hazards, lower bed, improve lighting, etc.), and Implemented/recommended increased supervision/assistance    INTERDISPLINARY COMMUNICATION/COLLABORATION:  Physician, CASSANDRA, Alene Gilford, and RN, CNA    NEW MEDICATION INITIATION DOCUMENTATION:  N/a    Reason medication is being initiated:  n/a    MD / Provider name consulted re: change in status / initiation of new medication:  n/a    New Symptom(s):  n/a    New Order(s):  n/a    Name of the person notified of the changes:  n/a    Name of person being taught:  n/a    Instructions given:  n/a    Side Effects taught:  n/a    Response to teaching:  n/a      COMFORTABLE DYING MEASURE:  Is Patient/family satisfied with symptom level?  yes    DISCHARGE PLAN:  Patient likely to pass at [facility], if symptoms can be managed patient to return home with family.

## 2024-10-25 NOTE — PERIOP NOTES
Patient with history of MRSA in 2016. Order for contact isolation placed in [facility] under signed and held, multi-phase orders for the day of surgery.   DOS: 1/7/2019 Ifeanyi Power(Attending)

## 2025-04-21 NOTE — ED PROVIDER NOTES
Patient is a 57-year-old male with past medical history significant for chronic kidney disease, diabetes, hyperlipidemia, sleep apnea with use of CPAP who is status post cervical fusion, cholecystectomy and lumbar fusion presenting to the emergency department via EMS for evaluation of chest tightness associated with hypertension. He states he was lying in bed when he started to feel unwell and felt tightness and as though his blood pressure was up. He states when he checked his pressure it was 200s over 100s. He states he does not remember the exact number. He states in the past that he was on a clonidine patch but this was switched to clonidine pills for an unknown reason. He states he was living in [place] but is in the process of moving in with his daughter who is a nurse. He states that she manages his medications and make sure he does not have any missed doses. He states he was given a dose of clonidine this morning which is improved his blood pressures. He states over the past 6 to 7 months he has been having bilateral shoulder and hip pain. He states that when he was living near [facility] he was being followed by Dr. Deo Mckeon notes that primary care doctor is retiring. He states he has had cardiac work-ups in the past which have been negative but believes his last stress test was about 3 years ago. He states he does have significant family history of heart disease noting that his father passed of an MI at 45. He states he is not sure what has been causing his shoulder and hip pain but notes that the primary care doctor was prescribing him fentanyl patches. Does not think he seen an orthopedic recently. States is not believe he has been diagnosed with any aortic abnormality.        Past Medical History:   Diagnosis Date    Chronic kidney disease     Stage 2    Diabetes mellitus with stage 2 chronic kidney disease (HCC)     High cholesterol     Morbid obesity with BMI of 40.0-44.9, adult [facility])     MRSA infection 2016    Abcess on abdomen    Sleep apnea with use of continuous positive airway pressure (CPAP)     Tinnitus aurium, bilateral 2018       Past Surgical History:   Procedure Laterality Date    HX CERVICAL FUSION N/A     HX CHOLECYSTECTOMY      HX COLONOSCOPY N/A     HX CYST INCISION AND DRAINAGE N/A     Cyst on abdomen    HX LUMBAR FUSION N/A 2019    L 4-S1 Lami & L4-5 Fusion    HX TONSILLECTOMY N/A          Family History:   Problem Relation Age of Onset    Pneumonia Mother     Heart Attack Father 45    Heart Surgery Sister 76        CABG    No Known Problems Sister        Social History     Socioeconomic History    Marital status:      Spouse name: Not on file    Number of children: Not on file    Years of education: Not on file    Highest education level: Not on file   Occupational History    Not on file   Tobacco Use    Smoking status: Former     Packs/day: 1.50     Years: 20.00     Pack years: 30.00     Types: Cigarettes     Quit date: 0     Years since quittin.8    Smokeless tobacco: Never   Substance and Sexual Activity    Alcohol use: Yes     Alcohol/week: 5.0 standard drinks     Types: 1 Glasses of wine, 2 Cans of beer, 2 Shots of liquor per week    Drug use: No    Sexual activity: Not on file   Other Topics Concern    Not on file   Social History Narrative    Not on file     Social Determinants of Health     Financial Resource Strain: Not on file   Food Insecurity: Not on file   Transportation Needs: Not on file   Physical Activity: Not on file   Stress: Not on file   Social Connections: Not on file   Intimate Partner Violence: Not on file   Housing Stability: Not on file         ALLERGIES: Patient has no known allergies. Review of Systems   Constitutional:  Negative for fever. HENT:  Negative for drooling. Respiratory:  Negative for shortness of breath. Cardiovascular:  Positive for chest pain.    Gastrointestinal:  Negative for abdominal pain, nausea and vomiting. Genitourinary:  Negative for dysuria. Musculoskeletal:  Positive for arthralgias. Skin:  Negative for rash. Neurological:  Negative for seizures and syncope. Psychiatric/Behavioral:  Negative for confusion and decreased concentration. Vitals:    10/21/22 1700 10/21/22 1730 10/21/22 1801 10/21/22 1829   BP: (!) 141/74 (!) 169/81 (!) 147/66 (!) 131/104   Pulse: 63 70 (!) 59 67   Resp: 16 11 14 10   Temp:       SpO2: 92%  92% 96%   Weight:       Height:                Physical Exam  Vitals reviewed. Constitutional:       General: He is not in acute distress. Appearance: He is obese. He is not toxic-appearing or diaphoretic. HENT:      Head: Atraumatic. Neck:      Trachea: No tracheal deviation. Cardiovascular:      Rate and Rhythm: Normal rate. Heart sounds: No murmur heard. No friction rub. Pulmonary:      Effort: Pulmonary effort is normal.      Breath sounds: Normal breath sounds. Chest:      Chest wall: No deformity or crepitus. Abdominal:      General: Abdomen is protuberant. Palpations: Abdomen is soft. There is no mass or pulsatile mass. Tenderness: There is no abdominal tenderness. There is no guarding or rebound. Negative signs include Lua's sign. Musculoskeletal:      Cervical back: Neck supple. Comments: Trace bilateral pitting edema, no apparent worsening of pain with range of motion or palpation of hips or knees. No apparent worsening of pain with palpation or range of motion of shoulders   Skin:     General: Skin is warm and dry. Neurological:      Mental Status: He is alert and oriented to person, place, and time.    Psychiatric:         Mood and Affect: Mood normal.         Behavior: Behavior normal.        MDM     Amount and/or Complexity of Data Reviewed  Clinical lab tests: reviewed and ordered  Tests in the radiology section of CPT®: ordered and reviewed  Discuss the patient with other providers: yes    Risk of Complications, Morbidity, and/or Mortality  Presenting problems: high    Patient Progress  Patient progress: stable    ED Course as of 10/21/22 1928   Fri Oct 21, 2022   1351 EcG obtained at 1010 showing sinus bradycardia, rate 54, normal intervals, nonspecific T wave flattening, not significantly changed compared to prior EKG. [JE]      ED Course User Index  [JE] Marlo Diallo MD       Procedures             Perfect Serve Consult for Admission  3:32 PM    ED Room Number: JCO40/30  Patient Name and age:  Lexie Hays 78 y.o.  male  Working Diagnosis:   1. Renal mass    2. Chest pain, unspecified type    3. Retroperitoneal hemorrhage        COVID-19 Suspicion:  no  Sepsis present:  no  Reassessment needed: no  Code Status:  Full Code  Readmission: no  Isolation Requirements:  no  Recommended Level of Care:  telemetry  Department:[place] [facility] - [phone]  Other: Patient is a 19-year-old male with past medical history significant for chronic kidney disease, diabetes, hyperlipidemia, sleep apnea, cholecystectomy and prior lumbar fusion who presents emergency department for episode of hypertension and chest discomfort. Also notes chronic pain into his shoulders and hips of unknown significance. Daughter and power of  is a nurse and states that he he came to live with her in July due to some onset of dementia. Imaging demonstrates a small left retroperitoneal hemorrhage in the area of the left kidney without active extra. Blood pressures reportedly elevated earlier today however have been well controlled here in the ER. Also shows cystic structure on kidney of same side with unknown significance.   Radiology recommending MRI for further eval electronic

## (undated) DEVICE — COVER,MAYO STAND,STERILE: Brand: MEDLINE

## (undated) DEVICE — PREP KIT PEEL PTCH POVIDONE IOD

## (undated) DEVICE — STERILE POLYISOPRENE POWDER-FREE SURGICAL GLOVES WITH EMOLLIENT COATING: Brand: PROTEXIS

## (undated) DEVICE — TIP CLEANER: Brand: VALLEYLAB

## (undated) DEVICE — CONTAINER,SPECIMEN,3OZ,OR STRL: Brand: MEDLINE

## (undated) DEVICE — DRAIN KT WND 10FR RND 400ML --

## (undated) DEVICE — BONE WAX WHITE: Brand: BONE WAX WHITE

## (undated) DEVICE — ACCY PA100-A LEGEND LUB/DIFFUSER 4 PACK: Brand: MIDAS REX®

## (undated) DEVICE — TRAY CATH OD16FR SIL URIN M STATLOK STBL DEV SURSTP

## (undated) DEVICE — SUTURE VCRL SZ 1 L36IN ABSRB UD L36MM CT-1 1/2 CIR J947H

## (undated) DEVICE — DRSG PATCH ANTIMIC 1INX4.0MM -- CONVERT TO ITEM 356053

## (undated) DEVICE — ADHESIVE SKIN CLOSURE 4X22 CM PREMIERPRO EXOFINFUSION DISP

## (undated) DEVICE — SYRINGE MED 20ML STD CLR PLAS LUERLOCK TIP N CTRL DISP

## (undated) DEVICE — CATHETER IV 14GA L1.25IN TEF STR HUB INTROCAN SFTY

## (undated) DEVICE — MAGNETIC DRAPE: Brand: DEVON

## (undated) DEVICE — INFECTION CONTROL KIT SYS

## (undated) DEVICE — SOLUTION IRRIG 1000ML H2O STRL BLT

## (undated) DEVICE — CODMAN® SURGICAL PATTIES 3/4" X 3/4" (1.91CM X 1.91CM): Brand: CODMAN®

## (undated) DEVICE — SUTURE MCRYL SZ 3-0 L27IN ABSRB UD L24MM PS-1 3/8 CIR PRIM Y936H

## (undated) DEVICE — SYR LR LCK 1ML GRAD NSAF 30ML --

## (undated) DEVICE — WATERPROOF, BACTERIA PROOF DRESSING WITH ABSORBENT SEE THROUGH PAD: Brand: OPSITE POST-OP VISIBLE 25X10CM CTN 20

## (undated) DEVICE — SUTURE STRATAFIX SPRL SZ 1 L14IN ABSRB VLT L48CM CTX 1/2 SXPD2B405

## (undated) DEVICE — SPONGE LAP 18X18IN STRL -- 5/PK

## (undated) DEVICE — BIPOLAR FORCEPS CORD: Brand: VALLEYLAB

## (undated) DEVICE — NDL SPNE QNCKE 18GX3.5IN LF --

## (undated) DEVICE — COVER,TABLE,60X90,STERILE: Brand: MEDLINE

## (undated) DEVICE — AMD ANTIMICROBIAL DRAIN SPONGES, 6 PLY, 0.2% POLYHEXAMETHYLENE BIGUANIDE HCI (PHMB): Brand: EXCILON

## (undated) DEVICE — SUTURE VCRL SZ 2-0 L27IN ABSRB UD L26MM CT-2 1/2 CIR J269H

## (undated) DEVICE — DRAPE,REIN 53X77,STERILE: Brand: MEDLINE

## (undated) DEVICE — SYR 10ML LUER LOK 1/5ML GRAD --

## (undated) DEVICE — LAMINECTOMY RICHMOND-LF: Brand: MEDLINE INDUSTRIES, INC.

## (undated) DEVICE — TOOL 14MH30 LEGEND 14CM 3MM: Brand: MIDAS REX ™

## (undated) DEVICE — INTENDED FOR TISSUE SEPARATION, AND OTHER PROCEDURES THAT REQUIRE A SHARP SURGICAL BLADE TO PUNCTURE OR CUT.: Brand: BARD-PARKER ® CARBON RIB-BACK BLADES

## (undated) DEVICE — DRAPE,UTILITY,TAPE,15X26,STERILE: Brand: MEDLINE

## (undated) DEVICE — STERILE POLYISOPRENE POWDER-FREE SURGICAL GLOVES: Brand: PROTEXIS

## (undated) DEVICE — THE MILL DISPOSABLE - MEDIUM

## (undated) DEVICE — NEEDLE HYPO 22GA L1.5IN BLK S STL HUB POLYPR SHLD REG BVL

## (undated) DEVICE — 1010 S-DRAPE TOWEL DRAPE 10/BX: Brand: STERI-DRAPE™

## (undated) DEVICE — 3000CC GUARDIAN II: Brand: GUARDIAN

## (undated) DEVICE — MEDI-VAC NON-CONDUCTIVE SUCTION TUBING: Brand: CARDINAL HEALTH

## (undated) DEVICE — KIT POS W/ FOAM ARM CRADL SHEARGUARD CHST PD CVR FOR SPNL

## (undated) DEVICE — 3M™ TEGADERM™ TRANSPARENT FILM DRESSING FRAME STYLE, 1626, 4 IN X 4-3/4 IN (10 CM X 12 CM), 50/CT 4CT/CASE: Brand: 3M™ TEGADERM™

## (undated) DEVICE — (D)PREP SKN CHLRAPRP APPL 26ML -- CONVERT TO ITEM 371833

## (undated) DEVICE — DRAPE XR C ARM 41X74IN LF --

## (undated) DEVICE — STOPCOCK IV 3W --

## (undated) DEVICE — 3M™ TEGADERM™ TRANSPARENT FILM DRESSING FRAME STYLE, 1624W, 2-3/8 IN X 2-3/4 IN (6 CM X 7 CM), 100/CT 4CT/CASE: Brand: 3M™ TEGADERM™

## (undated) DEVICE — ELECTRODE BLDE L4IN NONINSULATED EDGE

## (undated) DEVICE — LIGHT HANDLE: Brand: DEVON

## (undated) DEVICE — Device

## (undated) DEVICE — GOWN,SIRUS,NONRNF,SETINSLV,XL,20/CS: Brand: MEDLINE

## (undated) DEVICE — KENDALL SCD EXPRESS SLEEVES, KNEE LENGTH, MEDIUM: Brand: KENDALL SCD

## (undated) DEVICE — DEVON™ KNEE AND BODY STRAP 60" X 3" (1.5 M X 7.6 CM): Brand: DEVON